# Patient Record
Sex: FEMALE | Race: WHITE | NOT HISPANIC OR LATINO | Employment: PART TIME | URBAN - METROPOLITAN AREA
[De-identification: names, ages, dates, MRNs, and addresses within clinical notes are randomized per-mention and may not be internally consistent; named-entity substitution may affect disease eponyms.]

---

## 2017-02-07 DIAGNOSIS — I10 HYPERTENSION GOAL BP (BLOOD PRESSURE) < 140/90: Primary | ICD-10-CM

## 2017-02-07 NOTE — TELEPHONE ENCOUNTER
atenolol-chlorthalidone (TENORETIC 100) 100-25 MG per tablet   Last Written Prescription Date: 1-  Last Fill Quantity: 90, # refills: 3    Last Office Visit with G, P or [facility] prescribing provider:  6-   Future Office Visit:    Next 5 appointments (look out 90 days)     Feb 22, 2017  8:00 AM   Office Visit with BARRY Richter CNP   [facility] ([facility])    [address] [place] [id number] [phone]                    BP Readings from Last 3 Encounters:   09/15/16 107/75   06/22/16 119/81   01/29/16 129/87

## 2017-02-08 RX ORDER — ATENOLOL AND CHLORTHALIDONE TABLET 100; 25 MG/1; MG/1
1 TABLET ORAL DAILY
Qty: 30 TABLET | Refills: 0 | Status: SHIPPED | OUTPATIENT
Start: 2017-02-08 | End: 2017-02-22

## 2017-02-08 NOTE — TELEPHONE ENCOUNTER
Ok x one month- patient has appointment scheduled.  Carina Lozano,RN  [facility]  [phone]

## 2017-02-22 ENCOUNTER — OFFICE VISIT (OUTPATIENT)
Dept: FAMILY MEDICINE | Facility: CLINIC | Age: 62
End: 2017-02-22
Payer: COMMERCIAL

## 2017-02-22 VITALS
RESPIRATION RATE: 16 BRPM | TEMPERATURE: 97.6 F | OXYGEN SATURATION: 98 % | HEIGHT: 66 IN | SYSTOLIC BLOOD PRESSURE: 115 MMHG | BODY MASS INDEX: 24.11 KG/M2 | DIASTOLIC BLOOD PRESSURE: 82 MMHG | HEART RATE: 58 BPM | WEIGHT: 150 LBS

## 2017-02-22 DIAGNOSIS — E78.5 HYPERLIPIDEMIA LDL GOAL <130: ICD-10-CM

## 2017-02-22 DIAGNOSIS — K59.04 CHRONIC IDIOPATHIC CONSTIPATION: ICD-10-CM

## 2017-02-22 DIAGNOSIS — I10 HYPERTENSION GOAL BP (BLOOD PRESSURE) < 140/90: ICD-10-CM

## 2017-02-22 DIAGNOSIS — M81.0 OSTEOPOROSIS: ICD-10-CM

## 2017-02-22 DIAGNOSIS — Z00.00 ENCOUNTER FOR ROUTINE ADULT HEALTH EXAMINATION WITHOUT ABNORMAL FINDINGS: Primary | ICD-10-CM

## 2017-02-22 DIAGNOSIS — G40.909 SEIZURE DISORDER (H): ICD-10-CM

## 2017-02-22 DIAGNOSIS — G43.019 INTRACTABLE MIGRAINE WITHOUT AURA AND WITHOUT STATUS MIGRAINOSUS: ICD-10-CM

## 2017-02-22 DIAGNOSIS — Z11.59 NEED FOR HEPATITIS C SCREENING TEST: ICD-10-CM

## 2017-02-22 LAB
ALBUMIN SERPL-MCNC: 4 G/DL (ref 3.4–5)
ALP SERPL-CCNC: 95 U/L (ref 40–150)
ALT SERPL W P-5'-P-CCNC: 33 U/L (ref 0–50)
ANION GAP SERPL CALCULATED.3IONS-SCNC: 7 MMOL/L (ref 3–14)
AST SERPL W P-5'-P-CCNC: 21 U/L (ref 0–45)
BILIRUB SERPL-MCNC: 0.4 MG/DL (ref 0.2–1.3)
BUN SERPL-MCNC: 14 MG/DL (ref 7–30)
CALCIUM SERPL-MCNC: 10.2 MG/DL (ref 8.5–10.1)
CARBAMAZEPINE SERPL-MCNC: 8.6 MG/L (ref 4–12)
CHLORIDE SERPL-SCNC: 98 MMOL/L (ref 94–109)
CHOLEST SERPL-MCNC: 226 MG/DL
CO2 SERPL-SCNC: 29 MMOL/L (ref 20–32)
CREAT SERPL-MCNC: 0.7 MG/DL (ref 0.52–1.04)
GFR SERPL CREATININE-BSD FRML MDRD: 85 ML/MIN/1.7M2
GLUCOSE SERPL-MCNC: 91 MG/DL (ref 70–99)
HCV AB SERPL QL IA: NORMAL
HDLC SERPL-MCNC: 106 MG/DL
LDLC SERPL CALC-MCNC: 101 MG/DL
NONHDLC SERPL-MCNC: 120 MG/DL
POTASSIUM SERPL-SCNC: 3.2 MMOL/L (ref 3.4–5.3)
PROT SERPL-MCNC: 7.7 G/DL (ref 6.8–8.8)
SODIUM SERPL-SCNC: 134 MMOL/L (ref 133–144)
TRIGL SERPL-MCNC: 96 MG/DL

## 2017-02-22 PROCEDURE — 80156 ASSAY CARBAMAZEPINE TOTAL: CPT | Performed by: NURSE PRACTITIONER

## 2017-02-22 PROCEDURE — 86803 HEPATITIS C AB TEST: CPT | Performed by: NURSE PRACTITIONER

## 2017-02-22 PROCEDURE — 80061 LIPID PANEL: CPT | Performed by: NURSE PRACTITIONER

## 2017-02-22 PROCEDURE — 36415 COLL VENOUS BLD VENIPUNCTURE: CPT | Performed by: NURSE PRACTITIONER

## 2017-02-22 PROCEDURE — 99396 PREV VISIT EST AGE 40-64: CPT | Performed by: NURSE PRACTITIONER

## 2017-02-22 PROCEDURE — 80053 COMPREHEN METABOLIC PANEL: CPT | Performed by: NURSE PRACTITIONER

## 2017-02-22 RX ORDER — CARBAMAZEPINE 200 MG/1
200 TABLET ORAL 2 TIMES DAILY
Qty: 180 TABLET | Refills: 3 | Status: ON HOLD | OUTPATIENT
Start: 2017-02-22 | End: 2018-02-25

## 2017-02-22 RX ORDER — SIMVASTATIN 40 MG
40 TABLET ORAL AT BEDTIME
Qty: 90 TABLET | Refills: 3 | Status: SHIPPED | OUTPATIENT
Start: 2017-02-22 | End: 2018-06-06

## 2017-02-22 RX ORDER — ATENOLOL AND CHLORTHALIDONE TABLET 100; 25 MG/1; MG/1
1 TABLET ORAL DAILY
Qty: 90 TABLET | Refills: 3 | Status: ON HOLD | OUTPATIENT
Start: 2017-02-22 | End: 2018-02-25

## 2017-02-22 RX ORDER — IBANDRONATE SODIUM 150 MG/1
150 TABLET, FILM COATED ORAL
Qty: 3 TABLET | Refills: 3 | Status: SHIPPED | OUTPATIENT
Start: 2017-02-22 | End: 2018-02-22

## 2017-02-22 RX ORDER — TOPIRAMATE 25 MG/1
25 TABLET, FILM COATED ORAL 2 TIMES DAILY
Qty: 180 TABLET | Refills: 3 | Status: SHIPPED | OUTPATIENT
Start: 2017-02-22 | End: 2019-04-29

## 2017-02-22 NOTE — PROGRESS NOTES
SUBJECTIVE:     CC: Love Pinon is an 61 year old woman who presents for preventive health visit.     Healthy Habits:    Do you get at least three servings of calcium containing foods daily (dairy, green leafy vegetables, etc.)? yes    Amount of exercise or daily activities, outside of work: 0 day(s) per week    Problems taking medications regularly No    Medication side effects: No    Have you had an eye exam in the past two years? yes    Do you see a dentist twice per year? no    Do you have sleep apnea, excessive snoring or daytime drowsiness?no             Today's PHQ-2 Score:   PHQ-2 ( 1999 Pfizer) 6/22/2016 1/29/2016   Q1: Little interest or pleasure in doing things 0 0   Q2: Feeling down, depressed or hopeless 0 0   PHQ-2 Score 0 0   Little interest or pleasure in doing things - -   Feeling down, depressed or hopeless - -   PHQ-2 Score - -       Abuse: Current or Past(Physical, Sexual or Emotional)- No  Do you feel safe in your environment - Yes    Social History   Substance Use Topics     Smoking status: Former Smoker     Years: 18.00     Types: Cigarettes     Quit date: 12/31/1989     Smokeless tobacco: Never Used     Alcohol use 1.8 oz/week     3 Standard drinks or equivalent per week      Comment: 2/week     The patient does not drink >3 drinks per day nor >7 drinks per week.    Recent Labs   Lab Test  02/09/16   0902  07/23/14   1144  09/17/12   0829   CHOL  222*  216*  233*   HDL  100  99  71   LDL  103*  101  137*   TRIG  93  83  124   CHOLHDLRATIO   --   2.2  3.3   NHDL  122   --    --        Reviewed orders with patient.  Reviewed health maintenance and updated orders accordingly - Yes    Mammo Decision Support:  Patient over age 50, mutual decision to screen reflected in health maintenance.  Pertinent mammograms are reviewed under the imaging tab.    History of abnormal Pap smear: NO - age 30- 65 PAP every 3 years recommended  All Histories reviewed and updated in [facility].  Past Medical  History   Diagnosis Date     BPPV (benign paroxysmal positional vertigo) 10/26/2012     Herpes genitalia      Hyperlipidemia      Hypertension      Iron deficiency anemia secondary to blood loss (chronic)      menorrhagia     Migraine headache without aura      Seizure disorder (H)      one seizure-grandmal in 1989      Past Surgical History   Procedure Laterality Date     D & c       Hysteroscopy       Tubal ligation       Cl aff surgical pathology  1970     with cryotherapy     Appendectomy  2007     Colonoscopy  2005, 9/15/16     hx polyp (hyerplastic?),      Colonoscopy N/A 9/15/2016     Procedure: COMBINED COLONOSCOPY, SINGLE OR MULTIPLE BIOPSY/POLYPECTOMY BY BIOPSY;  Surgeon: Martin Dewitt MD;  Location: [facility]       Social Hx: Single. Has 2 children (son & daughter). Works selling granite.       ROS:  C: NEGATIVE for fever, chills, change in weight  I: NEGATIVE for worrisome rashes, moles or lesions  E: NEGATIVE for vision changes or irritation  ENT: NEGATIVE for ear, mouth and throat problems. Zyrtec for allergies prn.   R: NEGATIVE for significant cough or SOB  B: NEGATIVE for masses, tenderness or discharge  CV: NEGATIVE for chest pain, palpitations or peripheral edema  GI: NEGATIVE for nausea, abdominal pain, heartburn, or change in bowel habits. Chronic constipation. Takes Laverne-Colace at bedtime.  Colonoscopy 9/2016.  Had 3 adenoma polyps removed. Next procedure in 3 years   : NEGATIVE for unusual urinary or vaginal symptoms. No vaginal bleeding. No genital herpes outbreaks in past year.   M: NEGATIVE for significant arthralgias or myalgia. DEXA bone density test  8/2016 showed osteoporosis in spine (t score - 2.6). Wants to take preventive medication. Her mother had osteoporosis.    N: NEGATIVE for weakness, dizziness or paresthesias. Topamax for migraine prophylaxis and Tegretol for one grand mal seizure many years ago.   P: NEGATIVE for changes in mood or affect       OBJECTIVE:     /82 (Cuff  "Size: Adult Large)  Pulse 58  Temp 97.6  F (36.4  C) (Tympanic)  Resp 16  Ht 5' 5.75\" (1.67 m)  Wt 150 lb (68 kg)  SpO2 98%  BMI 24.4 kg/m2  EXAM:  GENERAL APPEARANCE: healthy, alert and no distress  EYES: Eyes grossly normal to inspection, PERRL and conjunctivae and sclerae normal  HENT: ear canals and TM's normal, nose and mouth without ulcers or lesions, oropharynx clear and oral mucous membranes moist  NECK: no adenopathy, no asymmetry, masses, or scars and thyroid normal to palpation  RESP: lungs clear to auscultation - no rales, rhonchi or wheezes  BREAST: normal without masses, tenderness or nipple discharge and no palpable axillary masses or adenopathy  CV: regular rate and rhythm, normal S1 S2, no S3 or S4, no murmur, click or rub, no peripheral edema and peripheral pulses strong  ABDOMEN: soft, nontender, no hepatosplenomegaly, no masses and bowel sounds normal  MS: no musculoskeletal defects are noted and gait is age appropriate without ataxia  SKIN: no suspicious lesions or rashes  NEURO: Normal strength and tone, sensory exam grossly normal, mentation intact and speech normal  PSYCH: mentation appears normal and affect normal/bright    ASSESSMENT/PLAN:     Love was seen today for physical.    Diagnoses and all orders for this visit:    Encounter for routine adult health examination without abnormal findings  -     Comprehensive metabolic panel    Hypertension goal BP (blood pressure) < 140/90  -     Comprehensive metabolic panel  -     Lipid Profile with reflex to direct LDL  -     atenolol-chlorthalidone (TENORETIC 100) 100-25 MG per tablet; Take 1 tablet by mouth daily    Hyperlipidemia LDL goal <130  -     Comprehensive metabolic panel  -     simvastatin (ZOCOR) 40 MG tablet; Take 1 tablet (40 mg) by mouth At Bedtime    Intractable migraine without aura and without status migrainosus  -     Hepatitis C Screen Reflex to HCV RNA Quant and Genotype  -     topiramate (TOPAMAX) 25 MG tablet; Take 1 " "tablet (25 mg) by mouth 2 times daily    Seizure disorder (H)  -     Carbamazepine total  -     carBAMazepine (TEGRETOL) 200 MG tablet; Take 1 tablet (200 mg) by mouth 2 times daily    Osteoporosis  -     IBANdronate (BONIVA) 150 MG tablet; Take 1 tablet (150 mg) by mouth every 30 days Take 60 minutes before am meal with 8 oz. water. Remain upright for 30 minutes.    Chronic idiopathic constipation    Need for hepatitis C screening test      I have discussed with patient the risks, benefits, medications, treatment options and modalities.  - Boniva  - Plan DEXA in one year.     COUNSELING:   Reviewed preventive health counseling, as reflected in patient instructions  Special attention given to:        Regular exercise       Healthy diet/nutrition       Vision screening       Hearing screening       Osteoporosis Prevention/Bone Health       Consider Hep C screening for patients born between 1945 and 1965         reports that she quit smoking about 27 years ago. Her smoking use included Cigarettes. She quit after 18.00 years of use. She has never used smokeless tobacco.    Estimated body mass index is 22.44 kg/(m^2) as calculated from the following:    Height as of 9/15/16: 5' 6\" (1.676 m).    Weight as of 9/15/16: 139 lb (63 kg).       Counseling Resources:  ATP IV Guidelines  Pooled Cohorts Equation Calculator  Breast Cancer Risk Calculator  FRAX Risk Assessment  ICSI Preventive Guidelines  Dietary Guidelines for Americans, 2010  USDA's MyPlate  ASA Prophylaxis  Lung CA Screening    BARRY Richter [facility]  "

## 2017-02-22 NOTE — MR AVS SNAPSHOT
After Visit Summary   2/22/2017    Love Pinon    MRN: [id number]           Patient Information     Date Of Birth          1955        Visit Information        Provider Department      2/22/2017 8:00 AM Rosey Limon APRN [facility]        Today's Diagnoses     Encounter for routine adult health examination without abnormal findings    -  1    Need for hepatitis C screening test        Hyperlipidemia LDL goal <130        Hypertension goal BP (blood pressure) < 140/90        Seizure disorder (H)        Intractable migraine without aura and without status migrainosus        Osteoporosis          Care Instructions      Preventive Health Recommendations  Female Ages 50 - 64    Yearly exam: See your health care provider every year in order to  o Review health changes.   o Discuss preventive care.    o Review your medicines if your doctor has prescribed any.      Get a Pap test every three years (unless you have an abnormal result and your provider advises testing more often).    If you get Pap tests with HPV test, you only need to test every 5 years, unless you have an abnormal result.     You do not need a Pap test if your uterus was removed (hysterectomy) and you have not had cancer.    You should be tested each year for STDs (sexually transmitted diseases) if you're at risk.     Have a mammogram every 1 to 2 years.    Have a colonoscopy at age 50, or have a yearly FIT test (stool test). These exams screen for colon cancer.      Have a cholesterol test every 5 years, or more often if advised.    Have a diabetes test (fasting glucose) every three years. If you are at risk for diabetes, you should have this test more often.     If you are at risk for osteoporosis (brittle bone disease), think about having a bone density scan (DEXA).    Shots: Get a flu shot each year. Get a tetanus shot every 10 years.    Nutrition:     Eat at least 5 servings of fruits and vegetables each  day.    Eat whole-grain bread, whole-wheat pasta and brown rice instead of white grains and rice.    Talk to your provider about Calcium and Vitamin D.     Lifestyle    Exercise at least 150 minutes a week (30 minutes a day, 5 days a week). This will help you control your weight and prevent disease.    Limit alcohol to one drink per day.    No smoking.     Wear sunscreen to prevent skin cancer.     See your dentist every six months for an exam and cleaning.    See your eye doctor every 1 to 2 years.          Follow-ups after your visit        Who to contact     If you have questions or need follow up information about today's clinic visit or your schedule please contact [facility] [place] directly at [phone].  Normal or non-critical lab and imaging results will be communicated to you by [employer], letter or phone within 4 business days after the clinic has received the results. If you do not hear from us within 7 days, please contact the clinic through [employer] or phone. If you have a critical or abnormal lab result, we will notify you by phone as soon as possible.  Submit refill requests through [employer] or call your pharmacy and they will forward the refill request to us. Please allow 3 business days for your refill to be completed.          Additional Information About Your Visit        Swapsee Information     [employer] gives you secure access to your electronic health record. If you see a primary care provider, you can also send messages to your care team and make appointments. If you have questions, please call your primary care clinic.  If you do not have a primary care provider, please call [phone] and they will assist you.        Care EveryWhere ID     This is your Care EveryWhere ID. This could be used by other organizations to access your [facility] medical records  [id number]        Your Vitals Were     Pulse Temperature Respirations Height Pulse Oximetry BMI (Body Mass Index)    58 97.6  F  "(36.4  C) (Tympanic) 16 5' 5.75\" (1.67 m) 98% 24.4 kg/m2       Blood Pressure from Last 3 Encounters:   02/22/17 115/82   09/15/16 107/75   06/22/16 119/81    Weight from Last 3 Encounters:   02/22/17 150 lb (68 kg)   09/15/16 139 lb (63 kg)   06/22/16 143 lb (64.9 kg)              We Performed the Following     Carbamazepine total     Comprehensive metabolic panel     Hepatitis C Screen Reflex to HCV RNA Quant and Genotype     Lipid Profile with reflex to direct LDL          Today's Medication Changes          These changes are accurate as of: 2/22/17  8:47 AM.  If you have any questions, ask your nurse or doctor.               Start taking these medicines.        Dose/Directions    IBANdronate 150 MG tablet   Commonly known as:  BONIVA   Used for:  Osteoporosis   Started by:  Rosey Limon APRN CNP        Dose:  150 mg   Take 1 tablet (150 mg) by mouth every 30 days Take 60 minutes before am meal with 8 oz. water. Remain upright for 30 minutes.   Quantity:  3 tablet   Refills:  3            Where to get your medicines      These medications were sent to [facility]  [place], [place] - [address] [facility]  [address] [facility] [address]    Hours:  Added 10/26 CK Checked with pharmacy Phone:  [phone]     atenolol-chlorthalidone 100-25 MG per tablet    carBAMazepine 200 MG tablet    IBANdronate 150 MG tablet    simvastatin 40 MG tablet    topiramate 25 MG tablet                Primary Care Provider Office Phone # Fax #    BARRY Richter [phone]       [address]  [place] [address]        Thank you!     [facility]. Our goal is always to provide you with excellent care. Hearing back from our patients is one way we can continue to improve our services. Please take a few minutes to complete the written survey that you may receive in the mail after your visit with us. Thank " you!             Your Updated Medication List - Protect others around you: Learn how to safely use, store and throw away your medicines at www.disposemymeds.org.          This list is accurate as of: 2/22/17  8:47 AM.  Always use your most recent med list.                   Brand Name Dispense Instructions for use    atenolol-chlorthalidone 100-25 MG per tablet    TENORETIC 100    90 tablet    Take 1 tablet by mouth daily       calcium + D 600-200 MG-UNIT Tabs   Generic drug:  calcium carbonate-vitamin D      2 tablets daily       carBAMazepine 200 MG tablet    TEGRETOL    180 tablet    Take 1 tablet (200 mg) by mouth 2 times daily       Cranberry 500 MG Caps      Take  by mouth. Daily       fish oil-omega-3 fatty acids 1000 MG capsule      Twice daily       IBANdronate 150 MG tablet    BONIVA    3 tablet    Take 1 tablet (150 mg) by mouth every 30 days Take 60 minutes before am meal with 8 oz. water. Remain upright for 30 minutes.       KLS ALLER-JUWAN 10 MG tablet   Generic drug:  cetirizine      Take 10 mg by mouth daily.       senna-docusate 8.6-50 MG per tablet    SENOKOT-S;PERICOLACE    28 tablet    Take 1 tablet by mouth daily       simvastatin 40 MG tablet    ZOCOR    90 tablet    Take 1 tablet (40 mg) by mouth At Bedtime       topiramate 25 MG tablet    TOPAMAX    180 tablet    Take 1 tablet (25 mg) by mouth 2 times daily

## 2017-02-22 NOTE — NURSING NOTE
"Chief Complaint   Patient presents with     Physical       Initial /82 (Cuff Size: Adult Large)  Pulse 58  Temp 97.6  F (36.4  C) (Tympanic)  Resp 16  Ht 5' 5.75\" (1.67 m)  Wt 150 lb (68 kg)  SpO2 98%  BMI 24.4 kg/m2 Estimated body mass index is 24.4 kg/(m^2) as calculated from the following:    Height as of this encounter: 5' 5.75\" (1.67 m).    Weight as of this encounter: 150 lb (68 kg).  Medication Reconciliation: complete   Kaila Tabares, CMA    "

## 2017-02-26 DIAGNOSIS — E83.52 SERUM CALCIUM ELEVATED: Primary | ICD-10-CM

## 2017-02-26 DIAGNOSIS — E87.6 LOW BLOOD POTASSIUM: ICD-10-CM

## 2017-08-14 ENCOUNTER — OFFICE VISIT (OUTPATIENT)
Dept: FAMILY MEDICINE | Facility: CLINIC | Age: 62
End: 2017-08-14
Payer: COMMERCIAL

## 2017-08-14 VITALS
DIASTOLIC BLOOD PRESSURE: 72 MMHG | HEART RATE: 60 BPM | SYSTOLIC BLOOD PRESSURE: 116 MMHG | BODY MASS INDEX: 24.75 KG/M2 | OXYGEN SATURATION: 100 % | WEIGHT: 154 LBS | HEIGHT: 66 IN | RESPIRATION RATE: 16 BRPM | TEMPERATURE: 97.7 F

## 2017-08-14 DIAGNOSIS — E87.6 HYPOKALEMIA: ICD-10-CM

## 2017-08-14 DIAGNOSIS — M81.0 AGE RELATED OSTEOPOROSIS, UNSPECIFIED PATHOLOGICAL FRACTURE PRESENCE: ICD-10-CM

## 2017-08-14 DIAGNOSIS — R30.9 PAIN WITH URINATION: Primary | ICD-10-CM

## 2017-08-14 DIAGNOSIS — G40.909 SEIZURE DISORDER (H): ICD-10-CM

## 2017-08-14 DIAGNOSIS — E83.52 HYPERCALCEMIA: ICD-10-CM

## 2017-08-14 DIAGNOSIS — L29.9 PRURITIC DISORDER: ICD-10-CM

## 2017-08-14 DIAGNOSIS — R31.9 HEMATURIA: ICD-10-CM

## 2017-08-14 DIAGNOSIS — E87.1 HYPONATREMIA: ICD-10-CM

## 2017-08-14 LAB
ALBUMIN SERPL-MCNC: 4 G/DL (ref 3.4–5)
ALBUMIN UR-MCNC: NEGATIVE MG/DL
ALP SERPL-CCNC: 88 U/L (ref 40–150)
ALT SERPL W P-5'-P-CCNC: 36 U/L (ref 0–50)
ANION GAP SERPL CALCULATED.3IONS-SCNC: 8 MMOL/L (ref 3–14)
APPEARANCE UR: CLEAR
AST SERPL W P-5'-P-CCNC: 24 U/L (ref 0–45)
BACTERIA #/AREA URNS HPF: ABNORMAL /HPF
BILIRUB SERPL-MCNC: 0.4 MG/DL (ref 0.2–1.3)
BILIRUB UR QL STRIP: NEGATIVE
BUN SERPL-MCNC: 13 MG/DL (ref 7–30)
CA-I SERPL ISE-MCNC: 5.1 MG/DL (ref 4.4–5.2)
CALCIUM SERPL-MCNC: 10.2 MG/DL (ref 8.5–10.1)
CHLORIDE SERPL-SCNC: 93 MMOL/L (ref 94–109)
CO2 SERPL-SCNC: 29 MMOL/L (ref 20–32)
COLOR UR AUTO: YELLOW
CREAT SERPL-MCNC: 0.81 MG/DL (ref 0.52–1.04)
DEPRECATED CALCIDIOL+CALCIFEROL SERPL-MC: 55 UG/L (ref 20–75)
GFR SERPL CREATININE-BSD FRML MDRD: 71 ML/MIN/1.7M2
GLUCOSE SERPL-MCNC: 107 MG/DL (ref 70–99)
GLUCOSE UR STRIP-MCNC: NEGATIVE MG/DL
HGB UR QL STRIP: ABNORMAL
KETONES UR STRIP-MCNC: NEGATIVE MG/DL
LEUKOCYTE ESTERASE UR QL STRIP: NEGATIVE
NITRATE UR QL: NEGATIVE
PH UR STRIP: 7 PH (ref 5–7)
POTASSIUM SERPL-SCNC: 3.6 MMOL/L (ref 3.4–5.3)
PROT SERPL-MCNC: 7.7 G/DL (ref 6.8–8.8)
PTH-INTACT SERPL-MCNC: 17 PG/ML (ref 12–72)
RBC #/AREA URNS AUTO: ABNORMAL /HPF (ref 0–2)
SODIUM SERPL-SCNC: 130 MMOL/L (ref 133–144)
SP GR UR STRIP: 1.01 (ref 1–1.03)
URN SPEC COLLECT METH UR: ABNORMAL
UROBILINOGEN UR STRIP-ACNC: 0.2 EU/DL (ref 0.2–1)
WBC #/AREA URNS AUTO: ABNORMAL /HPF (ref 0–2)

## 2017-08-14 PROCEDURE — 99214 OFFICE O/P EST MOD 30 MIN: CPT | Performed by: PHYSICIAN ASSISTANT

## 2017-08-14 PROCEDURE — 36415 COLL VENOUS BLD VENIPUNCTURE: CPT | Performed by: PHYSICIAN ASSISTANT

## 2017-08-14 PROCEDURE — 81001 URINALYSIS AUTO W/SCOPE: CPT | Performed by: PHYSICIAN ASSISTANT

## 2017-08-14 PROCEDURE — 80053 COMPREHEN METABOLIC PANEL: CPT | Performed by: PHYSICIAN ASSISTANT

## 2017-08-14 PROCEDURE — 82306 VITAMIN D 25 HYDROXY: CPT | Performed by: PHYSICIAN ASSISTANT

## 2017-08-14 PROCEDURE — 83970 ASSAY OF PARATHORMONE: CPT | Performed by: PHYSICIAN ASSISTANT

## 2017-08-14 PROCEDURE — 82330 ASSAY OF CALCIUM: CPT | Performed by: PHYSICIAN ASSISTANT

## 2017-08-14 NOTE — PATIENT INSTRUCTIONS
1. Try off of the Boniva for one month (skip August dose) and then update me on your symptoms.   2. Keep me posted on the urinary symptoms over the next week. Consider cutting back on iced tea.

## 2017-08-14 NOTE — MR AVS SNAPSHOT
After Visit Summary   8/14/2017    Love Pinon    MRN: [id number]           Patient Information     Date Of Birth          1955        Visit Information        Provider Department      8/14/2017 8:40 AM Kenna Davis PA-C [facility]        Today's Diagnoses     Pain with urination    -  1    Hypercalcemia        Hypokalemia        Age related osteoporosis, unspecified pathological fracture presence          Care Instructions    1. Try off of the Boniva for one month (skip August dose) and then update me on your symptoms.   2. Keep me posted on the urinary symptoms over the next week. Consider cutting back on iced tea.             Follow-ups after your visit        Who to contact     If you have questions or need follow up information about today's clinic visit or your schedule please contact [facility] [place] directly at [phone].  Normal or non-critical lab and imaging results will be communicated to you by [employer], letter or phone within 4 business days after the clinic has received the results. If you do not hear from us within 7 days, please contact the clinic through [employer] or phone. If you have a critical or abnormal lab result, we will notify you by phone as soon as possible.  Submit refill requests through S[employer]where or call your pharmacy and they will forward the refill request to us. Please allow 3 business days for your refill to be completed.          Additional Information About Your Visit        MyChart Information     [employer] gives you secure access to your electronic health record. If you see a primary care provider, you can also send messages to your care team and make appointments. If you have questions, please call your primary care clinic.  If you do not have a primary care provider, please call [phone] and they will assist you.        Care EveryWhere ID     This is your Care EveryWhere ID. This could be used by other  "organizations to access your [facility] medical records  [id number]        Your Vitals Were     Pulse Temperature Respirations Height Pulse Oximetry BMI (Body Mass Index)    60 97.7  F (36.5  C) 16 5' 5.75\" (1.67 m) 100% 25.05 kg/m2       Blood Pressure from Last 3 Encounters:   08/14/17 116/72   02/22/17 115/82   09/15/16 107/75    Weight from Last 3 Encounters:   08/14/17 154 lb (69.9 kg)   02/22/17 150 lb (68 kg)   09/15/16 139 lb (63 kg)              We Performed the Following     *UA reflex to Microscopic     Calcium ionized     Comprehensive metabolic panel (BMP + Alb, Alk Phos, ALT, AST, Total. Bili, TP)     Parathyroid Hormone Intact     Urine Microscopic     Vitamin D Deficiency          Today's Medication Changes          These changes are accurate as of: 8/14/17  9:21 AM.  If you have any questions, ask your nurse or doctor.               These medicines have changed or have updated prescriptions.        Dose/Directions    calcium + D 600-200 MG-UNIT Tabs   This may have changed:  See the new instructions.   Generic drug:  calcium carbonate-vitamin D   Changed by:  Kenna Davis PA-C        Dose:  1 tablet   Take 1 tablet by mouth daily   Quantity:  60 tablet   Refills:  0                Primary Care Provider Office Phone # Fax #    Kenna Davis PA-C [phone]        [facility] [address] [facility] [place] [place] [address]        Equal Access to Services     [facility] AH: Hadii nagi ku hadasho Soomaali, waaxda luqadaha, qaybta kaalmada adeegyada, wax patricia faulkner . So [facility] [phone].    ATENCIÓN: Si habla español, tiene a amanda disposición servicios gratuitos de asistencia lingüística. Llame al [phone].    We comply with applicable federal civil rights laws and [place] laws. We do not discriminate on the basis of race, color, national origin, age, disability sex, sexual orientation or gender identity.            Thank you!     Thank you for " choosing [facility] [place]  for your care. Our goal is always to provide you with excellent care. Hearing back from our patients is one way we can continue to improve our services. Please take a few minutes to complete the written survey that you may receive in the mail after your visit with us. Thank you!             Your Updated Medication List - Protect others around you: Learn how to safely use, store and throw away your medicines at www.disposemymeds.org.          This list is accurate as of: 8/14/17  9:21 AM.  Always use your most recent med list.                   Brand Name Dispense Instructions for use Diagnosis    atenolol-chlorthalidone 100-25 MG per tablet    TENORETIC 100    90 tablet    Take 1 tablet by mouth daily    Hypertension goal BP (blood pressure) < 140/90       calcium + D 600-200 MG-UNIT Tabs   Generic drug:  calcium carbonate-vitamin D     60 tablet    Take 1 tablet by mouth daily        carBAMazepine 200 MG tablet    TEGRETOL    180 tablet    Take 1 tablet (200 mg) by mouth 2 times daily    Seizure disorder (H)       Cranberry 500 MG Caps      Take  by mouth. Daily        fish oil-omega-3 fatty acids 1000 MG capsule      Twice daily        IBANdronate 150 MG tablet    BONIVA    3 tablet    Take 1 tablet (150 mg) by mouth every 30 days Take 60 minutes before am meal with 8 oz. water. Remain upright for 30 minutes.    Osteoporosis       KLS ALLER-JUWAN 10 MG tablet   Generic drug:  cetirizine      Take 10 mg by mouth daily.        senna-docusate 8.6-50 MG per tablet    SENOKOT-S;PERICOLACE    28 tablet    Take 1 tablet by mouth daily        simvastatin 40 MG tablet    ZOCOR    90 tablet    Take 1 tablet (40 mg) by mouth At Bedtime    Hyperlipidemia LDL goal <130       topiramate 25 MG tablet    TOPAMAX    180 tablet    Take 1 tablet (25 mg) by mouth 2 times daily    Intractable migraine without aura and without status migrainosus

## 2017-08-14 NOTE — PROGRESS NOTES
"Chief Complaint   Patient presents with     UTI     Medication Problem     ??? Boniva       Initial /72  Pulse 60  Temp 97.7  F (36.5  C)  Resp 16  Ht 5' 5.75\" (1.67 m)  Wt 154 lb (69.9 kg)  SpO2 100%  BMI 25.05 kg/m2 Estimated body mass index is 25.05 kg/(m^2) as calculated from the following:    Height as of this encounter: 5' 5.75\" (1.67 m).    Weight as of this encounter: 154 lb (69.9 kg).  Medication Reconciliation: complete. HIRAM Wills LPN      SUBJECTIVE:                                                    Love Pinon is a 62 year old female who presents to clinic today for the following health issues:      URINARY TRACT SYMPTOMS  Onset: 1 week    Description:   Painful urination (Dysuria): YES  Blood in urine (Hematuria): no   Delay in urine (Hesitency): no     Intensity: moderate    Progression of Symptoms:  worsening    Accompanying Signs & Symptoms:  Fever/chills: no   Flank pain no   Nausea and vomiting: no   Any vaginal symptoms: none  Abdominal/Pelvic Pain: no     History:   History of frequent UTI's: YES- 1-2 / year  History of kidney stones: no   Sexually Active: no   Possibility of pregnancy: No    Precipitating factors:   none    Therapies Tried and outcome: good with fluid intake     Symptoms are not as bad as usually are - usually more pain. Unsure if UTI.     #2 - concerns with possible reaction to Boniva medication - on rx x 4 months has itchy skin- skin changes. Says for past month, her skin from upper chest to neck under jawline are itchy and leather like. She thinks it's from the Boniva. Tried topical benadryl and and eczema non-itch cream.       -------------------------------------    Problem list and histories reviewed & adjusted, as indicated.  Additional history: as documented    Patient Active Problem List   Diagnosis     HYPERLIPIDEMIA LDL GOAL <130     Advance care planning     BPPV (benign paroxysmal positional vertigo)     Migraine headache without aura     " Hypertension goal BP (blood pressure) < 140/90     Seizure disorder (H)     Osteoporosis     Chronic idiopathic constipation     Past Surgical History:   Procedure Laterality Date     APPENDECTOMY  2007     CL AFF SURGICAL PATHOLOGY  1970    with cryotherapy     COLONOSCOPY  2005    polyp excision, repeat  5 years      COLONOSCOPY N/A 9/15/2016    3 adenoma polyps, repeat in 3 years      D & C       HYSTEROSCOPY       TUBAL LIGATION         Social History   Substance Use Topics     Smoking status: Former Smoker     Years: 18.00     Types: Cigarettes     Quit date: 1989     Smokeless tobacco: Never Used     Alcohol use 1.8 oz/week     3 Standard drinks or equivalent per week      Comment: 2/week     Family History   Problem Relation Age of Onset     Cardiovascular Paternal Grandfather       of heart attack     Hypertension Father      Lipids Father      CANCER Father      MELANOMA     Genitourinary Problems Mother      CANCER Paternal Grandmother      Asthma Sister      Asthma Sister          Current Outpatient Prescriptions   Medication Sig Dispense Refill     calcium carbonate-vitamin D (CALCIUM + D) 600-200 MG-UNIT TABS Take 1 tablet by mouth daily 60 tablet 0     atenolol-chlorthalidone (TENORETIC 100) 100-25 MG per tablet Take 1 tablet by mouth daily 90 tablet 3     carBAMazepine (TEGRETOL) 200 MG tablet Take 1 tablet (200 mg) by mouth 2 times daily 180 tablet 3     topiramate (TOPAMAX) 25 MG tablet Take 1 tablet (25 mg) by mouth 2 times daily 180 tablet 3     simvastatin (ZOCOR) 40 MG tablet Take 1 tablet (40 mg) by mouth At Bedtime 90 tablet 3     IBANdronate (BONIVA) 150 MG tablet Take 1 tablet (150 mg) by mouth every 30 days Take 60 minutes before am meal with 8 oz. water. Remain upright for 30 minutes. 3 tablet 3     senna-docusate (SENOKOT-S;PERICOLACE) 8.6-50 MG per tablet Take 1 tablet by mouth daily 28 tablet 0     cetirizine (KLS ALLER-JUWAN) 10 MG tablet Take 10 mg by mouth daily.        "Cranberry 500 MG CAPS Take  by mouth. Daily         FISH OIL 1000 MG OR CAPS Twice daily       Allergies   Allergen Reactions     Aramine [Metaraminol Bitartrate]      Penicillins      Hives, swelling     Labs reviewed in EPIC      Reviewed and updated as needed this visit by clinical staffTobacco  Allergies  Meds  Surg Hx  Fam Hx  Soc Hx      Reviewed and updated as needed this visit by Provider         Social History     Social History     Marital status: Single     Spouse name:      Number of children: 2     Years of education: N/A     Occupational History     sales  [employer]     Social History Main Topics     Smoking status: Former Smoker     Years: 18.00     Types: Cigarettes     Quit date: 12/31/1989     Smokeless tobacco: Never Used     Alcohol use 1.8 oz/week     3 Standard drinks or equivalent per week      Comment: 2/week     Drug use: No     Sexual activity: No     Other Topics Concern      Service No     Blood Transfusions No     Caffeine Concern No     Occupational Exposure No     Hobby Hazards No     Sleep Concern Yes     trouble sleeping      Stress Concern Yes     little     Weight Concern No     Special Diet No     Back Care Yes     low back pain would like refferal to chiroprator     Exercise Yes     alot work with job     Bike Helmet No     Seat Belt Yes     Self-Exams Yes     Parent/Sibling W/ Cabg, Mi Or Angioplasty Before 65f 55m? No     Social History Narrative     is alcoholic and had stage 4 lung cancer    Two children    Dexa scan 8/18/05 normal       10 point review of systems negative other than symptoms noted above.   Constitutional, HEENT, CV, pulmonary, GI, , MS, Endo, Psych systems are all negative, except as otherwise noted.       OBJECTIVE:  /72  Pulse 60  Temp 97.7  F (36.5  C)  Resp 16  Ht 5' 5.75\" (1.67 m)  Wt 154 lb (69.9 kg)  SpO2 100%  BMI 25.05 kg/m2  CONSTITUTIONAL: Alert, well-nourished, " well-groomed, NAD  RESP: Lungs CTA. No wheeze, rhonchi, rales. Normal effort on room air. Equal lung sounds bilaterally.   CV: HRRR, normal S1, S2. No MRG. No peripheral edema.  DERM: No rashes or suspicious lesions  GI: Abdomen flat. BS x 4. No TTP. No HSM or masses. No CVAT      Diagnostic Tests:  Results for orders placed or performed in visit on 08/14/17   *UA reflex to Microscopic   Result Value Ref Range    Color Urine Yellow     Appearance Urine Clear     Glucose Urine Negative NEG mg/dL    Bilirubin Urine Negative NEG    Ketones Urine Negative NEG mg/dL    Specific Gravity Urine 1.015 1.003 - 1.035    Blood Urine Small (A) NEG    pH Urine 7.0 5.0 - 7.0 pH    Protein Albumin Urine Negative NEG mg/dL    Urobilinogen Urine 0.2 0.2 - 1.0 EU/dL    Nitrite Urine Negative NEG    Leukocyte Esterase Urine Negative NEG    Source Midstream Urine    Urine Microscopic   Result Value Ref Range    WBC Urine O - 2 0 - 2 /HPF    RBC Urine 2-5 (A) 0 - 2 /HPF    Bacteria Urine Moderate (A) NEG /HPF   Comprehensive metabolic panel (BMP + Alb, Alk Phos, ALT, AST, Total. Bili, TP)   Result Value Ref Range    Sodium 130 (L) 133 - 144 mmol/L    Potassium 3.6 3.4 - 5.3 mmol/L    Chloride 93 (L) 94 - 109 mmol/L    Carbon Dioxide 29 20 - 32 mmol/L    Anion Gap 8 3 - 14 mmol/L    Glucose 107 (H) 70 - 99 mg/dL    Urea Nitrogen 13 7 - 30 mg/dL    Creatinine 0.81 0.52 - 1.04 mg/dL    GFR Estimate 71 >60 mL/min/1.7m2    GFR Estimate If Black 86 >60 mL/min/1.7m2    Calcium 10.2 (H) 8.5 - 10.1 mg/dL    Bilirubin Total 0.4 0.2 - 1.3 mg/dL    Albumin 4.0 3.4 - 5.0 g/dL    Protein Total 7.7 6.8 - 8.8 g/dL    Alkaline Phosphatase 88 40 - 150 U/L    ALT 36 0 - 50 U/L    AST 24 0 - 45 U/L         ASSESSMENT/PLAN:  (R30.9) Pain with urination  (primary encounter diagnosis)  Comment: no UTI on UA.   Plan: *UA reflex to Microscopic, Urine Microscopic            (E83.52) Hypercalcemia  Comment: check more labs.   Plan: Calcium ionized, Comprehensive  metabolic panel         (BMP + Alb, Alk Phos, ALT, AST, Total. Bili,         TP), Parathyroid Hormone Intact            (E87.6) Hypokalemia  Comment:   Plan: Comprehensive metabolic panel (BMP + Alb, Alk         Phos, ALT, AST, Total. Bili, TP)            (G40.909) Seizure disorder (H)  Comment:   Plan: Has been stable on medications for 20+ yrs. Has had ONE grand mal sz. Had an EEG 8 yrs ago and she still had sz activity, so neuro told her to continue medications. PCP has been rx'ing.     (M81.0) Age related osteoporosis, unspecified pathological fracture presence  Comment: I am not entirely convinced her itching is from Boniva, but I have suggested she take a month off from the medication and see if symptoms resolve. If so, we can consider another option - fosamax. She started boniva d/t convenience with once monthly dosing.   Plan: Vitamin D Deficiency            (R31.9) Hematuria  Comment: noted to have micro hematuria in most all samples in chart, recommend recheck in a few weeks with NO symptoms to see if this resolves.   She says she saw urology a long time ago where they didn't do anything and just charged her a big bill.   We will need to consider CT and urology referral if blood cells still present.   Plan: *UA reflex to Microscopic and Culture ([place]         and [place] [facility] (except [place] and         [place])            (E87.1) Hyponatremia  Comment: noted today. Looks like her K+ and Na+ have fluctuated w/ each check.   She drinks a lot of free water, so I may have her back off on that slightly and recheck Na+ in a few weeks.   If persistently low, consider stopping chlorthalidone.   Plan: Basic metabolic panel  (Ca, Cl, CO2, Creat,         Gluc, K, Na, BUN)            (L29.9) Pruritic disorder  Comment:   Plan: Exam is normal, will try a month off of Boniva and see how her symptoms are.. Taking Zyrtec daily.       FOLLOW-UP: Labs in a couple of weeks, sooner PRN. Update me on itching.   The  patient agrees with this assessment and plan and agrees to call or return to the clinic with any questions or concerns or if their condition worsens.    HANNAH Oviedo, PA-C  [facility]

## 2017-09-22 ENCOUNTER — TELEPHONE (OUTPATIENT)
Dept: FAMILY MEDICINE | Facility: CLINIC | Age: 62
End: 2017-09-22

## 2017-09-22 ENCOUNTER — E-VISIT (OUTPATIENT)
Dept: FAMILY MEDICINE | Facility: CLINIC | Age: 62
End: 2017-09-22
Payer: COMMERCIAL

## 2017-09-22 DIAGNOSIS — N30.00 ACUTE CYSTITIS WITHOUT HEMATURIA: Primary | ICD-10-CM

## 2017-09-22 PROCEDURE — 99444 ZZC PHYSICIAN ONLINE EVALUATION & MANAGEMENT SERVICE: CPT | Performed by: PHYSICIAN ASSISTANT

## 2017-09-22 RX ORDER — SULFAMETHOXAZOLE/TRIMETHOPRIM 800-160 MG
1 TABLET ORAL 2 TIMES DAILY
Qty: 6 TABLET | Refills: 0 | Status: SHIPPED | OUTPATIENT
Start: 2017-09-22 | End: 2017-09-25

## 2017-09-22 NOTE — TELEPHONE ENCOUNTER
[facility].  Routing to Triage to see if this requires a phone call      Nancy BARON

## 2017-09-22 NOTE — TELEPHONE ENCOUNTER
"Triaged for UTI    Just had culture (8/14/17)  and it was negative. Now horrible pain with urination, cloudy urine, foul smelling urine. No fever. No back pain. No blood in urine. Has had this for 4 days, but states that she came in too soon last time.    Advised patient to go to  ([address] [place] [address]) due to severity of symptoms. States that she \"might tough it out and do E visit and lab only on Monday.\"    Advised to push fluids. Wait until feeling a better to get flu shot.  Lanny Vásquez RN    "

## 2017-10-10 ENCOUNTER — OFFICE VISIT (OUTPATIENT)
Dept: FAMILY MEDICINE | Facility: CLINIC | Age: 62
End: 2017-10-10
Payer: COMMERCIAL

## 2017-10-10 VITALS
HEART RATE: 61 BPM | BODY MASS INDEX: 25.33 KG/M2 | OXYGEN SATURATION: 100 % | WEIGHT: 157.6 LBS | HEIGHT: 66 IN | TEMPERATURE: 97.2 F | DIASTOLIC BLOOD PRESSURE: 87 MMHG | SYSTOLIC BLOOD PRESSURE: 131 MMHG

## 2017-10-10 DIAGNOSIS — Z78.9 DISCOMFORT: Primary | ICD-10-CM

## 2017-10-10 DIAGNOSIS — N30.01 ACUTE CYSTITIS WITH HEMATURIA: ICD-10-CM

## 2017-10-10 DIAGNOSIS — R82.90 NONSPECIFIC FINDING ON EXAMINATION OF URINE: ICD-10-CM

## 2017-10-10 LAB
ALBUMIN UR-MCNC: 30 MG/DL
APPEARANCE UR: CLEAR
BACTERIA #/AREA URNS HPF: ABNORMAL /HPF
BILIRUB UR QL STRIP: NEGATIVE
COLOR UR AUTO: YELLOW
GLUCOSE UR STRIP-MCNC: NEGATIVE MG/DL
HGB UR QL STRIP: ABNORMAL
KETONES UR STRIP-MCNC: ABNORMAL MG/DL
LEUKOCYTE ESTERASE UR QL STRIP: ABNORMAL
NITRATE UR QL: POSITIVE
NON-SQ EPI CELLS #/AREA URNS LPF: ABNORMAL /LPF
PH UR STRIP: 7.5 PH (ref 5–7)
RBC #/AREA URNS AUTO: ABNORMAL /HPF
SOURCE: ABNORMAL
SP GR UR STRIP: 1.01 (ref 1–1.03)
UROBILINOGEN UR STRIP-ACNC: 0.2 EU/DL (ref 0.2–1)
WBC #/AREA URNS AUTO: ABNORMAL /HPF

## 2017-10-10 PROCEDURE — 81001 URINALYSIS AUTO W/SCOPE: CPT | Performed by: PHYSICIAN ASSISTANT

## 2017-10-10 PROCEDURE — 87086 URINE CULTURE/COLONY COUNT: CPT | Performed by: PHYSICIAN ASSISTANT

## 2017-10-10 PROCEDURE — 87088 URINE BACTERIA CULTURE: CPT | Performed by: PHYSICIAN ASSISTANT

## 2017-10-10 PROCEDURE — 87186 SC STD MICRODIL/AGAR DIL: CPT | Performed by: PHYSICIAN ASSISTANT

## 2017-10-10 PROCEDURE — 99213 OFFICE O/P EST LOW 20 MIN: CPT | Performed by: PHYSICIAN ASSISTANT

## 2017-10-10 RX ORDER — CIPROFLOXACIN 500 MG/1
500 TABLET, FILM COATED ORAL 2 TIMES DAILY
Qty: 14 TABLET | Refills: 0 | Status: SHIPPED | OUTPATIENT
Start: 2017-10-10 | End: 2018-02-16

## 2017-10-10 NOTE — NURSING NOTE
"Chief Complaint   Patient presents with     RECHECK       Initial /87 (BP Location: Left arm, Patient Position: Chair, Cuff Size: Adult Regular)  Pulse 61  Temp 97.2  F (36.2  C) (Tympanic)  Ht 5' 5.75\" (1.67 m)  Wt 157 lb 9.6 oz (71.5 kg)  SpO2 100%  Breastfeeding? No  BMI 25.63 kg/m2 Estimated body mass index is 25.63 kg/(m^2) as calculated from the following:    Height as of this encounter: 5' 5.75\" (1.67 m).    Weight as of this encounter: 157 lb 9.6 oz (71.5 kg).  Medication Reconciliation: complete     eDepali Sen MA     "

## 2017-10-10 NOTE — PROGRESS NOTES
SUBJECTIVE:   Love Pinon is a 62 year old female who presents to clinic today for the following health issues:      Recheck: UTI - Constant pain - pain level 7/10 - constant feeling of needing to go to the restroom - trouble sleeping - discomfort     Bactrim helped a little, but then came back.   No back/abd pain/fever/nausea/vomiting/over hematuria.     Problem list and histories reviewed & adjusted, as indicated.  Additional history: as documented    Patient Active Problem List   Diagnosis     HYPERLIPIDEMIA LDL GOAL <130     Advance care planning     BPPV (benign paroxysmal positional vertigo)     Migraine headache without aura     Hypertension goal BP (blood pressure) < 140/90     Seizure disorder (H)     Osteoporosis     Chronic idiopathic constipation     Past Surgical History:   Procedure Laterality Date     APPENDECTOMY       CL AFF SURGICAL PATHOLOGY  1970    with cryotherapy     COLONOSCOPY      polyp excision, repeat  5 years      COLONOSCOPY N/A 9/15/2016    3 adenoma polyps, repeat in 3 years      D & C       HYSTEROSCOPY       TUBAL LIGATION         Social History   Substance Use Topics     Smoking status: Former Smoker     Years: 18.00     Types: Cigarettes     Quit date: 1989     Smokeless tobacco: Never Used     Alcohol use 1.8 oz/week     3 Standard drinks or equivalent per week      Comment: 2/week     Family History   Problem Relation Age of Onset     Cardiovascular Paternal Grandfather       of heart attack     Hypertension Father      Lipids Father      CANCER Father      MELANOMA     Genitourinary Problems Mother      CANCER Paternal Grandmother      Asthma Sister      Asthma Sister          Current Outpatient Prescriptions   Medication Sig Dispense Refill     ciprofloxacin (CIPRO) 500 MG tablet Take 1 tablet (500 mg) by mouth 2 times daily 14 tablet 0     calcium carbonate-vitamin D (CALCIUM + D) 600-200 MG-UNIT TABS Take 1 tablet by mouth daily 60 tablet 0      atenolol-chlorthalidone (TENORETIC 100) 100-25 MG per tablet Take 1 tablet by mouth daily 90 tablet 3     carBAMazepine (TEGRETOL) 200 MG tablet Take 1 tablet (200 mg) by mouth 2 times daily 180 tablet 3     topiramate (TOPAMAX) 25 MG tablet Take 1 tablet (25 mg) by mouth 2 times daily 180 tablet 3     simvastatin (ZOCOR) 40 MG tablet Take 1 tablet (40 mg) by mouth At Bedtime 90 tablet 3     IBANdronate (BONIVA) 150 MG tablet Take 1 tablet (150 mg) by mouth every 30 days Take 60 minutes before am meal with 8 oz. water. Remain upright for 30 minutes. 3 tablet 3     senna-docusate (SENOKOT-S;PERICOLACE) 8.6-50 MG per tablet Take 1 tablet by mouth daily 28 tablet 0     cetirizine (KLS ALLER-JUWAN) 10 MG tablet Take 10 mg by mouth daily.       Cranberry 500 MG CAPS Take  by mouth. Daily         FISH OIL 1000 MG OR CAPS Twice daily       Allergies   Allergen Reactions     Aramine [Metaraminol Bitartrate]      Penicillins      Hives, swelling     Labs reviewed in EPIC      Reviewed and updated as needed this visit by clinical staffTobacco  Allergies  Med Hx  Surg Hx  Fam Hx  Soc Hx      Reviewed and updated as needed this visit by Provider         Social History     Social History     Marital status: Single     Spouse name:      Number of children: 2     Years of education: N/A     Occupational History     sales  [employer]     Social History Main Topics     Smoking status: Former Smoker     Years: 18.00     Types: Cigarettes     Quit date: 12/31/1989     Smokeless tobacco: Never Used     Alcohol use 1.8 oz/week     3 Standard drinks or equivalent per week      Comment: 2/week     Drug use: No     Sexual activity: No     Other Topics Concern      Service No     Blood Transfusions No     Caffeine Concern No     Occupational Exposure No     Hobby Hazards No     Sleep Concern Yes     trouble sleeping      Stress Concern Yes     little     Weight Concern No     Special Diet  "No     Back Care Yes     low back pain would like refferal to chiroprator     Exercise Yes     alot work with job     Bike Helmet No     Seat Belt Yes     Self-Exams Yes     Parent/Sibling W/ Cabg, Mi Or Angioplasty Before 65f 55m? No     Social History Narrative     is alcoholic and had stage 4 lung cancer    Two children    Dexa scan 8/18/05 normal       10 point review of systems negative other than symptoms noted above.   Constitutional, HEENT, CV, pulmonary, GI, , MS, Endo, Psych systems are all negative, except as otherwise noted.       OBJECTIVE:  /87 (BP Location: Left arm, Patient Position: Chair, Cuff Size: Adult Regular)  Pulse 61  Temp 97.2  F (36.2  C) (Tympanic)  Ht 5' 5.75\" (1.67 m)  Wt 157 lb 9.6 oz (71.5 kg)  SpO2 100%  Breastfeeding? No  BMI 25.63 kg/m2  CONSTITUTIONAL: Alert, well-nourished, well-groomed, NAD  RESP: Lungs CTA. No wheeze, rhonchi, rales. Normal effort on room air. Equal lung sounds bilaterally.   CV: HRRR, normal S1, S2. No MRG. No peripheral edema.  DERM: No rashes or suspicious lesions  GI: Abdomen flat. BS x 4. No TTP. No HSM or masses. No CVAT      Diagnostic Tests:  Results for orders placed or performed in visit on 10/10/17   *UA reflex to Microscopic and Culture ([place] and [facility] (except [place] and [place])   Result Value Ref Range    Color Urine Yellow     Appearance Urine Clear     Glucose Urine Negative NEG^Negative mg/dL    Bilirubin Urine Negative NEG^Negative    Ketones Urine Trace (A) NEG^Negative mg/dL    Specific Gravity Urine 1.015 1.003 - 1.035    Blood Urine Moderate (A) NEG^Negative    pH Urine 7.5 (H) 5.0 - 7.0 pH    Protein Albumin Urine 30 (A) NEG^Negative mg/dL    Urobilinogen Urine 0.2 0.2 - 1.0 EU/dL    Nitrite Urine Positive (A) NEG^Negative    Leukocyte Esterase Urine Small (A) NEG^Negative    Source Midstream Urine    Urine Microscopic   Result Value Ref Range    WBC Urine 5-10 (A) OTO2^O - 2 /HPF    RBC Urine 10-25 " (A) OTO2^O - 2 /HPF    Squamous Epithelial /LPF Urine Few FEW^Few /LPF    Bacteria Urine Many (A) NEG^Negative /HPF         ASSESSMENT/PLAN:  (Z78.9) Discomfort  (primary encounter diagnosis)  Comment:   Plan: *UA reflex to Microscopic and Culture ([place]         [facility] [place] [facility] ([facility] and         [place]), Urine Microscopic            (R82.90) Nonspecific finding on examination of urine  Comment:   Plan: Urine Culture Aerobic Bacterial            (N30.01) Acute cystitis with hematuria  Comment: tx with cipro - reviewed all cautions for patient. She accepts the risks and expresses understanding.   Await culture.   Declines azo.   Plan: ciprofloxacin (CIPRO) 500 MG tablet        Saw urology in the past.       FOLLOW-UP: Routinely and sooner as needed.  The patient agrees with this assessment and plan and agrees to call or return to the clinic with any questions or concerns or if their condition worsens.    HANNAH Oviedo, PA-C  [facility]    ua

## 2017-10-10 NOTE — MR AVS SNAPSHOT
After Visit Summary   10/10/2017    Love Pinon    MRN: [id number]           Patient Information     Date Of Birth          1955        Visit Information        Provider Department      10/10/2017 3:00 PM Kenna Davis PA-C [facility] [place]        Today's Diagnoses     Discomfort    -  1    Nonspecific finding on examination of urine        Acute cystitis with hematuria           Follow-ups after your visit        Follow-up notes from your care team     Return if symptoms worsen or fail to improve.      Who to contact     If you have questions or need follow up information about today's clinic visit or your schedule please contact [facility] directly at [phone].  Normal or non-critical lab and imaging results will be communicated to you by Webtalkhart, letter or phone within 4 business days after the clinic has received the results. If you do not hear from us within 7 days, please contact the clinic through Webtalkhart or phone. If you have a critical or abnormal lab result, we will notify you by phone as soon as possible.  Submit refill requests through [employer] or call your pharmacy and they will forward the refill request to us. Please allow 3 business days for your refill to be completed.          Additional Information About Your Visit        MyChart Information     [employer] gives you secure access to your electronic health record. If you see a primary care provider, you can also send messages to your care team and make appointments. If you have questions, please call your primary care clinic.  If you do not have a primary care provider, please call [phone] and they will assist you.        Care EveryWhere ID     This is your Care EveryWhere ID. This could be used by other organizations to access your [facility] medical records  [id number]        Your Vitals Were     Pulse Temperature Height Pulse Oximetry Breastfeeding? BMI (Body Mass Index)  "   61 97.2  F (36.2  C) (Tympanic) 5' 5.75\" (1.67 m) 100% No 25.63 kg/m2       Blood Pressure from Last 3 Encounters:   10/10/17 131/87   08/14/17 116/72   02/22/17 115/82    Weight from Last 3 Encounters:   10/10/17 157 lb 9.6 oz (71.5 kg)   08/14/17 154 lb (69.9 kg)   02/22/17 150 lb (68 kg)              We Performed the Following     *UA reflex to Microscopic and Culture ([place] [facility] [place] (except [place] and [place])     Urine Culture Aerobic Bacterial     Urine Microscopic          Today's Medication Changes          These changes are accurate as of: 10/10/17  3:25 PM.  If you have any questions, ask your nurse or doctor.               Start taking these medicines.        Dose/Directions    ciprofloxacin 500 MG tablet   Commonly known as:  CIPRO   Used for:  Acute cystitis with hematuria   Started by:  Kenna Davis PA-C        Dose:  500 mg   Take 1 tablet (500 mg) by mouth 2 times daily   Quantity:  14 tablet   Refills:  0            Where to get your medicines      These medications were sent to [facility]/pharmacy #[id number] - [place], [place] - [address] [place] [facility] [place] [address]     Phone:  [phone]     ciprofloxacin 500 MG tablet                Primary Care Provider Office Phone # Fax #    Kenna Davis PA-C [phone]       [address] [place] [address] [address]  [place] [address]        Equal Access to Services     MISAEL LOMAX AH: Hadii aad ku hadasho Soomaali, waaxda luqadaha, qaybta kaalmada adeegyada, emma avila. So [facility] [phone].    ATENCIÓN: Si habla español, tiene a amanda disposición servicios gratuitos de asistencia lingüística. Llame al [phone].    We comply with applicable federal civil rights laws and [place] laws. We do not discriminate on the basis of race, color, national origin, age, disability, sex, sexual orientation, or gender identity.            Thank you!     Thank you for choosing " [facility] [place]  for your care. Our goal is always to provide you with excellent care. Hearing back from our patients is one way we can continue to improve our services. Please take a few minutes to complete the written survey that you may receive in the mail after your visit with us. Thank you!             Your Updated Medication List - Protect others around you: Learn how to safely use, store and throw away your medicines at www.disposemymeds.org.          This list is accurate as of: 10/10/17  3:25 PM.  Always use your most recent med list.                   Brand Name Dispense Instructions for use Diagnosis    atenolol-chlorthalidone 100-25 MG per tablet    TENORETIC 100    90 tablet    Take 1 tablet by mouth daily    Hypertension goal BP (blood pressure) < 140/90       calcium + D 600-200 MG-UNIT Tabs   Generic drug:  calcium carbonate-vitamin D     60 tablet    Take 1 tablet by mouth daily        carBAMazepine 200 MG tablet    TEGRETOL    180 tablet    Take 1 tablet (200 mg) by mouth 2 times daily    Seizure disorder (H)       ciprofloxacin 500 MG tablet    CIPRO    14 tablet    Take 1 tablet (500 mg) by mouth 2 times daily    Acute cystitis with hematuria       Cranberry 500 MG Caps      Take  by mouth. Daily        fish oil-omega-3 fatty acids 1000 MG capsule      Twice daily        IBANdronate 150 MG tablet    BONIVA    3 tablet    Take 1 tablet (150 mg) by mouth every 30 days Take 60 minutes before am meal with 8 oz. water. Remain upright for 30 minutes.    Osteoporosis       KLS ALLER-JUWAN 10 MG tablet   Generic drug:  cetirizine      Take 10 mg by mouth daily.        senna-docusate 8.6-50 MG per tablet    SENOKOT-S;PERICOLACE    28 tablet    Take 1 tablet by mouth daily        simvastatin 40 MG tablet    ZOCOR    90 tablet    Take 1 tablet (40 mg) by mouth At Bedtime    Hyperlipidemia LDL goal <130       topiramate 25 MG tablet    TOPAMAX    180 tablet    Take 1 tablet (25 mg) by mouth 2  times daily    Intractable migraine without aura and without status migrainosus

## 2017-10-11 LAB
BACTERIA SPEC CULT: ABNORMAL
SPECIMEN SOURCE: ABNORMAL

## 2017-10-20 DIAGNOSIS — G43.019 INTRACTABLE MIGRAINE WITHOUT AURA AND WITHOUT STATUS MIGRAINOSUS: ICD-10-CM

## 2017-10-23 RX ORDER — TOPIRAMATE 25 MG/1
25 TABLET, FILM COATED ORAL 2 TIMES DAILY
Qty: 180 TABLET | Refills: 3 | OUTPATIENT
Start: 2017-10-23

## 2017-10-23 NOTE — TELEPHONE ENCOUNTER
Duplicate- sent 02/22/17- info sent to pharmacy.  Carina Lozano,RN  [facility]  [phone]

## 2017-10-24 ENCOUNTER — ALLIED HEALTH/NURSE VISIT (OUTPATIENT)
Dept: NURSING | Facility: CLINIC | Age: 62
End: 2017-10-24
Payer: COMMERCIAL

## 2017-10-24 DIAGNOSIS — Z23 NEED FOR PROPHYLACTIC VACCINATION AND INOCULATION AGAINST INFLUENZA: Primary | ICD-10-CM

## 2017-10-24 PROCEDURE — 90471 IMMUNIZATION ADMIN: CPT

## 2017-10-24 PROCEDURE — 99207 ZZC NO CHARGE NURSE ONLY: CPT

## 2017-10-24 PROCEDURE — 90686 IIV4 VACC NO PRSV 0.5 ML IM: CPT

## 2017-10-24 NOTE — PROGRESS NOTES

## 2017-10-24 NOTE — MR AVS SNAPSHOT
After Visit Summary   10/24/2017    Love Pinon    MRN: [id number]           Patient Information     Date Of Birth          1955        Visit Information        Provider Department      10/24/2017 10:15 AM MCKAYLA ZARAGOZA/LPN [facility] [place]        Today's Diagnoses     Need for prophylactic vaccination and inoculation against influenza    -  1       Follow-ups after your visit        Who to contact     If you have questions or need follow up information about today's clinic visit or your schedule please contact [facility] [place] directly at [phone].  Normal or non-critical lab and imaging results will be communicated to you by Cortherahart, letter or phone within 4 business days after the clinic has received the results. If you do not hear from us within 7 days, please contact the clinic through Cortherahart or phone. If you have a critical or abnormal lab result, we will notify you by phone as soon as possible.  Submit refill requests through [email or web address] or call your pharmacy and they will forward the refill request to us. Please allow 3 business days for your refill to be completed.          Additional Information About Your Visit        MyChart Information     [email or web address] gives you secure access to your electronic health record. If you see a primary care provider, you can also send messages to your care team and make appointments. If you have questions, please call your primary care clinic.  If you do not have a primary care provider, please call [phone] and they will assist you.        Care EveryWhere ID     This is your Care EveryWhere ID. This could be used by other organizations to access your [facility] medical records  [id number]         Blood Pressure from Last 3 Encounters:   10/10/17 131/87   08/14/17 116/72   02/22/17 115/82    Weight from Last 3 Encounters:   10/10/17 157 lb 9.6 oz (71.5 kg)   08/14/17 154 lb (69.9 kg)   02/22/17 150 lb (68 kg)              We  Performed the Following     FLU VAC, SPLIT VIRUS IM > 3 YO (QUADRIVALENT) [17896]     Vaccine Administration, Initial [95379]        Primary Care Provider Office Phone # Fax #    Kenna Davis PA-C [phone]       [address]  [place] [address]        Equal Access to Services     [facility]: Hadii aad ku hadasho Soomaali, waaxda luqadaha, qaybta kaalmada adeegyada, waxay idiin hayaan adeeg kharash la'aan ah. So [facility] [phone].    ATENCIÓN: Si habla español, tiene a amanda disposición servicios gratuitos de asistencia lingüística. [facility] [phone].    We comply with applicable federal civil rights laws and [place] laws. We do not discriminate on the basis of race, color, national origin, age, disability, sex, sexual orientation, or gender identity.            Thank you!     [facility] your [facility]. Our goal is always to provide you with excellent care. Hearing back from our patients is one way we can continue to improve our services. Please take a few minutes to complete the written survey that you may receive in the mail after your visit with us. Thank you!             Your Updated Medication List - Protect others around you: Learn how to safely use, store and throw away your medicines at www.disposemymeds.org.          This list is accurate as of: 10/24/17 10:21 AM.  Always use your most recent med list.                   Brand Name Dispense Instructions for use Diagnosis    atenolol-chlorthalidone 100-25 MG per tablet    TENORETIC 100    90 tablet    Take 1 tablet by mouth daily    Hypertension goal BP (blood pressure) < 140/90       calcium + D 600-200 MG-UNIT Tabs   Generic drug:  calcium carbonate-vitamin D     60 tablet    Take 1 tablet by mouth daily        carBAMazepine 200 MG tablet    TEGRETOL    180 tablet    Take 1 tablet (200 mg) by mouth 2 times daily    Seizure disorder (H)       ciprofloxacin 500 MG tablet     CIPRO    14 tablet    Take 1 tablet (500 mg) by mouth 2 times daily    Acute cystitis with hematuria       Cranberry 500 MG Caps      Take  by mouth. Daily        fish oil-omega-3 fatty acids 1000 MG capsule      Twice daily        IBANdronate 150 MG tablet    BONIVA    3 tablet    Take 1 tablet (150 mg) by mouth every 30 days Take 60 minutes before am meal with 8 oz. water. Remain upright for 30 minutes.    Osteoporosis       KLS ALLER-JUWAN 10 MG tablet   Generic drug:  cetirizine      Take 10 mg by mouth daily.        senna-docusate 8.6-50 MG per tablet    SENOKOT-S;PERICOLACE    28 tablet    Take 1 tablet by mouth daily        simvastatin 40 MG tablet    ZOCOR    90 tablet    Take 1 tablet (40 mg) by mouth At Bedtime    Hyperlipidemia LDL goal <130       topiramate 25 MG tablet    TOPAMAX    180 tablet    Take 1 tablet (25 mg) by mouth 2 times daily    Intractable migraine without aura and without status migrainosus

## 2018-02-16 ENCOUNTER — OFFICE VISIT (OUTPATIENT)
Dept: FAMILY MEDICINE | Facility: CLINIC | Age: 63
End: 2018-02-16
Payer: COMMERCIAL

## 2018-02-16 VITALS
TEMPERATURE: 100.1 F | RESPIRATION RATE: 16 BRPM | SYSTOLIC BLOOD PRESSURE: 113 MMHG | DIASTOLIC BLOOD PRESSURE: 80 MMHG | BODY MASS INDEX: 23.95 KG/M2 | WEIGHT: 149 LBS | OXYGEN SATURATION: 100 % | HEIGHT: 66 IN | HEART RATE: 69 BPM

## 2018-02-16 DIAGNOSIS — J10.1 INFLUENZA A: Primary | ICD-10-CM

## 2018-02-16 DIAGNOSIS — R30.0 DYSURIA: ICD-10-CM

## 2018-02-16 LAB
ALBUMIN UR-MCNC: 30 MG/DL
AMORPH CRY #/AREA URNS HPF: ABNORMAL /HPF
APPEARANCE UR: CLEAR
BACTERIA #/AREA URNS HPF: ABNORMAL /HPF
BILIRUB UR QL STRIP: NEGATIVE
COLOR UR AUTO: YELLOW
FLUAV+FLUBV AG SPEC QL: NEGATIVE
FLUAV+FLUBV AG SPEC QL: POSITIVE
GLUCOSE UR STRIP-MCNC: NEGATIVE MG/DL
HGB UR QL STRIP: ABNORMAL
KETONES UR STRIP-MCNC: 40 MG/DL
LEUKOCYTE ESTERASE UR QL STRIP: ABNORMAL
NITRATE UR QL: NEGATIVE
NON-SQ EPI CELLS #/AREA URNS LPF: ABNORMAL /LPF
PH UR STRIP: 7.5 PH (ref 5–7)
RBC #/AREA URNS AUTO: ABNORMAL /HPF
SOURCE: ABNORMAL
SP GR UR STRIP: 1.01 (ref 1–1.03)
SPECIMEN SOURCE: ABNORMAL
UROBILINOGEN UR STRIP-ACNC: 0.2 EU/DL (ref 0.2–1)
WBC #/AREA URNS AUTO: ABNORMAL /HPF

## 2018-02-16 PROCEDURE — 87088 URINE BACTERIA CULTURE: CPT | Performed by: FAMILY MEDICINE

## 2018-02-16 PROCEDURE — 87186 SC STD MICRODIL/AGAR DIL: CPT | Mod: 59 | Performed by: FAMILY MEDICINE

## 2018-02-16 PROCEDURE — 87086 URINE CULTURE/COLONY COUNT: CPT | Performed by: FAMILY MEDICINE

## 2018-02-16 PROCEDURE — 81001 URINALYSIS AUTO W/SCOPE: CPT | Performed by: FAMILY MEDICINE

## 2018-02-16 PROCEDURE — 99213 OFFICE O/P EST LOW 20 MIN: CPT | Performed by: FAMILY MEDICINE

## 2018-02-16 PROCEDURE — 87804 INFLUENZA ASSAY W/OPTIC: CPT | Performed by: FAMILY MEDICINE

## 2018-02-16 RX ORDER — OSELTAMIVIR PHOSPHATE 75 MG/1
75 CAPSULE ORAL 2 TIMES DAILY
Qty: 10 CAPSULE | Refills: 0 | Status: SHIPPED | OUTPATIENT
Start: 2018-02-16 | End: 2018-02-22

## 2018-02-16 NOTE — MR AVS SNAPSHOT
"              After Visit Summary   2/16/2018    Love Pinon    MRN: [id number]           Patient Information     Date Of Birth          1955        Visit Information        Provider Department      2/16/2018 11:40 AM [facility] MD [facility] [place]        Today's Diagnoses     Influenza A    -  1    Dysuria           Follow-ups after your visit        Follow-up notes from your care team     Return if symptoms worsen or fail to improve.      Who to contact     If you have questions or need follow up information about today's clinic visit or your schedule please contact [facility] [place] directly at [phone].  Normal or non-critical lab and imaging results will be communicated to you by MyChart, letter or phone within 4 business days after the clinic has received the results. If you do not hear from us within 7 days, please contact the clinic through Apmetrixhart or phone. If you have a critical or abnormal lab result, we will notify you by phone as soon as possible.  Submit refill requests through [employer] or call your pharmacy and they will forward the refill request to us. Please allow 3 business days for your refill to be completed.          Additional Information About Your Visit        MyChart Information     [employer] gives you secure access to your electronic health record. If you see a primary care provider, you can also send messages to your care team and make appointments. If you have questions, please call your primary care clinic.  If you do not have a primary care provider, please call [phone] and they will assist you.        Care EveryWhere ID     This is your Care EveryWhere ID. This could be used by other organizations to access your [facility] medical records  [id number]        Your Vitals Were     Pulse Temperature Respirations Height Pulse Oximetry BMI (Body Mass Index)    69 100.1  F (37.8  C) (Tympanic) 16 5' 5.75\" (1.67 m) 100% 24.23 kg/m2       Blood " Pressure from Last 3 Encounters:   02/16/18 113/80   10/10/17 131/87   08/14/17 116/72    Weight from Last 3 Encounters:   02/16/18 149 lb (67.6 kg)   10/10/17 157 lb 9.6 oz (71.5 kg)   08/14/17 154 lb (69.9 kg)              We Performed the Following     Influenza A/B antigen     UA reflex to Microscopic and Culture     Urine Microscopic          Today's Medication Changes          These changes are accurate as of 2/16/18 12:16 PM.  If you have any questions, ask your nurse or doctor.               Start taking these medicines.        Dose/Directions    oseltamivir 75 MG capsule   Commonly known as:  TAMIFLU   Used for:  Influenza A   Started by:  Adrien Luna MD        Dose:  75 mg   Take 1 capsule (75 mg) by mouth 2 times daily   Quantity:  10 capsule   Refills:  0            Where to get your medicines      These medications were sent to [id number] - [place], [place] - [address] AT [facility] [address] [facility]  [address], [place] [address]     Phone:  [phone]     oseltamivir 75 MG capsule                Primary Care Provider    Kenna Davis PA-C       No address on file        Equal Access to Services     RINKU LOMAX AH: Hadii aad ku hadasho Soomaali, waaxda luqadaha, qaybta kaalmada adeegyada, waxay cadencein haysrinathn zac avila. So [facility] [phone].    ATENCIÓN: Si habla español, tiene a amanda disposición servicios gratuitos de asistencia lingüística. Llame al [phone].    We comply with applicable federal civil rights laws and [place] laws. We do not discriminate on the basis of race, color, national origin, age, disability, sex, sexual orientation, or gender identity.            Thank you!     Thank you for choosing [facility] [place]  for your care. Our goal is always to provide you with excellent care. Hearing back from our patients is one way we can continue to improve our services. Please take a few minutes to  complete the written survey that you may receive in the mail after your visit with us. Thank you!             Your Updated Medication List - Protect others around you: Learn how to safely use, store and throw away your medicines at www.disposemymeds.org.          This list is accurate as of 2/16/18 12:16 PM.  Always use your most recent med list.                   Brand Name Dispense Instructions for use Diagnosis    atenolol-chlorthalidone 100-25 MG per tablet    TENORETIC 100    90 tablet    Take 1 tablet by mouth daily    Hypertension goal BP (blood pressure) < 140/90       calcium + D 600-200 MG-UNIT Tabs   Generic drug:  calcium carbonate-vitamin D     60 tablet    Take 1 tablet by mouth daily        carBAMazepine 200 MG tablet    TEGRETOL    180 tablet    Take 1 tablet (200 mg) by mouth 2 times daily    Seizure disorder (H)       Cranberry 500 MG Caps      Take  by mouth. Daily        fish oil-omega-3 fatty acids 1000 MG capsule      Twice daily        IBANdronate 150 MG tablet    BONIVA    3 tablet    Take 1 tablet (150 mg) by mouth every 30 days Take 60 minutes before am meal with 8 oz. water. Remain upright for 30 minutes.    Osteoporosis       KLS ALLER-JUWAN 10 MG tablet   Generic drug:  cetirizine      Take 10 mg by mouth daily.        oseltamivir 75 MG capsule    TAMIFLU    10 capsule    Take 1 capsule (75 mg) by mouth 2 times daily    Influenza A       senna-docusate 8.6-50 MG per tablet    SENOKOT-S;PERICOLACE    28 tablet    Take 1 tablet by mouth daily        simvastatin 40 MG tablet    ZOCOR    90 tablet    Take 1 tablet (40 mg) by mouth At Bedtime    Hyperlipidemia LDL goal <130       topiramate 25 MG tablet    TOPAMAX    180 tablet    Take 1 tablet (25 mg) by mouth 2 times daily    Intractable migraine without aura and without status migrainosus

## 2018-02-16 NOTE — NURSING NOTE
"Chief Complaint   Patient presents with     URI     UTI       Initial /80 (Cuff Size: Adult Regular)  Pulse 69  Temp 100.1  F (37.8  C) (Tympanic)  Resp 16  Ht 5' 5.75\" (1.67 m)  Wt 149 lb (67.6 kg)  SpO2 100%  BMI 24.23 kg/m2 Estimated body mass index is 24.23 kg/(m^2) as calculated from the following:    Height as of this encounter: 5' 5.75\" (1.67 m).    Weight as of this encounter: 149 lb (67.6 kg).  Medication Reconciliation: complete   Kaila Tabares, CMA    "

## 2018-02-16 NOTE — PROGRESS NOTES
SUBJECTIVE:   Love Pinon is a 62 year old female who presents to clinic today for the following health issues:      RESPIRATORY SYMPTOMS      Duration: 3 days     Description  sore throat, cough, fatigue/malaise and myalgias    Severity: moderate    Accompanying signs and symptoms: SOB    History (predisposing factors):  none    Precipitating or alleviating factors: None    Therapies tried and outcome:  Tylenol     Problem list and histories reviewed & adjusted, as indicated.  Additional history: as documented    Patient Active Problem List   Diagnosis     HYPERLIPIDEMIA LDL GOAL <130     Advance care planning     BPPV (benign paroxysmal positional vertigo)     Migraine headache without aura     Hypertension goal BP (blood pressure) < 140/90     Seizure disorder (H)     Osteoporosis     Chronic idiopathic constipation     Past Surgical History:   Procedure Laterality Date     APPENDECTOMY       CL AFF SURGICAL PATHOLOGY  1970    with cryotherapy     COLONOSCOPY      polyp excision, repeat  5 years      COLONOSCOPY N/A 9/15/2016    3 adenoma polyps, repeat in 3 years      D & C       HYSTEROSCOPY       TUBAL LIGATION         Social History   Substance Use Topics     Smoking status: Former Smoker     Years: 18.00     Types: Cigarettes     Quit date: 1989     Smokeless tobacco: Never Used     Alcohol use 1.8 oz/week     3 Standard drinks or equivalent per week      Comment: 2/week     Family History   Problem Relation Age of Onset     Cardiovascular Paternal Grandfather       of heart attack     Hypertension Father      Lipids Father      CANCER Father      MELANOMA     Genitourinary Problems Mother      CANCER Paternal Grandmother      Asthma Sister      Asthma Sister          Current Outpatient Prescriptions   Medication Sig Dispense Refill     oseltamivir (TAMIFLU) 75 MG capsule Take 1 capsule (75 mg) by mouth 2 times daily 10 capsule 0     calcium carbonate-vitamin D (CALCIUM + D) 600-200  MG-UNIT TABS Take 1 tablet by mouth daily 60 tablet 0     atenolol-chlorthalidone (TENORETIC 100) 100-25 MG per tablet Take 1 tablet by mouth daily 90 tablet 3     carBAMazepine (TEGRETOL) 200 MG tablet Take 1 tablet (200 mg) by mouth 2 times daily 180 tablet 3     topiramate (TOPAMAX) 25 MG tablet Take 1 tablet (25 mg) by mouth 2 times daily 180 tablet 3     simvastatin (ZOCOR) 40 MG tablet Take 1 tablet (40 mg) by mouth At Bedtime 90 tablet 3     senna-docusate (SENOKOT-S;PERICOLACE) 8.6-50 MG per tablet Take 1 tablet by mouth daily 28 tablet 0     cetirizine (KLS ALLER-JUWAN) 10 MG tablet Take 10 mg by mouth daily.       Cranberry 500 MG CAPS Take  by mouth. Daily         FISH OIL 1000 MG OR CAPS Twice daily       IBANdronate (BONIVA) 150 MG tablet Take 1 tablet (150 mg) by mouth every 30 days Take 60 minutes before am meal with 8 oz. water. Remain upright for 30 minutes. (Patient not taking: Reported on 2/16/2018) 3 tablet 3     Allergies   Allergen Reactions     Aramine [Metaraminol Bitartrate]      Penicillins      Hives, swelling     Recent Labs   Lab Test  08/14/17   0923  02/22/17   0847  02/09/16   0902  07/23/14   1144   LDL   --   101*  103*  101   HDL   --   106  100  99   TRIG   --   96  93  83   ALT  36  33  33  51*   CR  0.81  0.70  0.63  0.68   GFRESTIMATED  71  85  >90  Non  GFR Calc    88   GFRESTBLACK  86  >90   GFR Calc    >90   GFR Calc    >90   POTASSIUM  3.6  3.2*  3.6  3.1*   TSH   --    --   1.79  0.57      BP Readings from Last 3 Encounters:   02/16/18 113/80   10/10/17 131/87   08/14/17 116/72    Wt Readings from Last 3 Encounters:   02/16/18 149 lb (67.6 kg)   10/10/17 157 lb 9.6 oz (71.5 kg)   08/14/17 154 lb (69.9 kg)                    Reviewed and updated as needed this visit by clinical staff       Reviewed and updated as needed this visit by Provider         ROS:  Constitutional, HEENT, cardiovascular, pulmonary, gi and gu systems are  "negative, except as otherwise noted.    OBJECTIVE:     /80 (Cuff Size: Adult Regular)  Pulse 69  Temp 100.1  F (37.8  C) (Tympanic)  Resp 16  Ht 5' 5.75\" (1.67 m)  Wt 149 lb (67.6 kg)  SpO2 100%  BMI 24.23 kg/m2  Body mass index is 24.23 kg/(m^2).  GENERAL: healthy, alert and no distress  NECK: no adenopathy, no asymmetry, masses, or scars and thyroid normal to palpation  RESP: lungs clear to auscultation - no rales, rhonchi or wheezes  CV: regular rate and rhythm, normal S1 S2, no S3 or S4, no murmur, click or rub, no peripheral edema and peripheral pulses strong  ABDOMEN: soft, nontender, no hepatosplenomegaly, no masses and bowel sounds normal  MS: no gross musculoskeletal defects noted, no edema        ASSESSMENT/PLAN:   Love was seen today for uri and uti.    Diagnoses and all orders for this visit:    Influenza A  -     oseltamivir (TAMIFLU) 75 MG capsule; Take 1 capsule (75 mg) by mouth 2 times daily    Fever  -     Influenza A/B antigen  -     Urine Microscopic    Dysuria  -     UA reflex to Microscopic and Culture          Adrien Luna MD  [facility]    "

## 2018-02-19 ENCOUNTER — TELEPHONE (OUTPATIENT)
Dept: FAMILY MEDICINE | Facility: CLINIC | Age: 63
End: 2018-02-19

## 2018-02-19 DIAGNOSIS — N30.00 ACUTE CYSTITIS WITHOUT HEMATURIA: ICD-10-CM

## 2018-02-19 DIAGNOSIS — R11.0 NAUSEA: Primary | ICD-10-CM

## 2018-02-19 LAB
BACTERIA SPEC CULT: ABNORMAL
BACTERIA SPEC CULT: ABNORMAL
SPECIMEN SOURCE: ABNORMAL

## 2018-02-19 RX ORDER — CIPROFLOXACIN 500 MG/1
500 TABLET, FILM COATED ORAL 2 TIMES DAILY
Qty: 10 TABLET | Refills: 0 | Status: ON HOLD | OUTPATIENT
Start: 2018-02-19 | End: 2018-02-25

## 2018-02-19 RX ORDER — PROCHLORPERAZINE MALEATE 10 MG
10 TABLET ORAL EVERY 8 HOURS PRN
Qty: 20 TABLET | Refills: 1 | Status: SHIPPED | OUTPATIENT
Start: 2018-02-19 | End: 2018-02-26

## 2018-02-19 NOTE — TELEPHONE ENCOUNTER
Compazine prescribed  Please check on with her about her urinary sx as I mentioned in the urine culture results note

## 2018-02-19 NOTE — TELEPHONE ENCOUNTER
Pt states that she has Influenza A, states that it was dx'd here in the clinic by Dr Luna.  Pt states that she was sent home on Tamiflu and states that the only other medication that she is taking at this time is Tylenol when she gets a headache.  Pt c/o nausea that started on Friday evening.  Pt denies abdominal pain, vomiting or diarrhea.  Pt denies fever.  Pt is tolerating and drinking fluids.    Please advise as necessary for nausea.    Nancy Childers RN  [employer]      LOV 1/16/18       ASSESSMENT/PLAN:   Love was seen today for uri and uti.     Diagnoses and all orders for this visit:     Influenza A  -     oseltamivir (TAMIFLU) 75 MG capsule; Take 1 capsule (75 mg) by mouth 2 times daily     Fever  -     Influenza A/B antigen  -     Urine Microscopic     Dysuria  -     UA reflex to Microscopic and Culture              Adrien Luna MD  [facility]

## 2018-02-19 NOTE — TELEPHONE ENCOUNTER
Patient informed and agreed with plan.  No further questions at this time    Luanne Oleary RN

## 2018-02-19 NOTE — TELEPHONE ENCOUNTER
Patient informed.  States that her urinary symptoms are still the same, still have burning sensation with urination with a little decreased in output  Advised hydration.   Please review and advise as appropriate.  Pharmacy pended.  Thank you  Triage to call with response - cell phone - OK to leave detail message    Luanne Oleary RN

## 2018-02-22 ENCOUNTER — APPOINTMENT (OUTPATIENT)
Dept: GENERAL RADIOLOGY | Facility: CLINIC | Age: 63
DRG: 645 | End: 2018-02-22
Attending: EMERGENCY MEDICINE
Payer: COMMERCIAL

## 2018-02-22 ENCOUNTER — HOSPITAL ENCOUNTER (INPATIENT)
Facility: CLINIC | Age: 63
LOS: 3 days | Discharge: HOME | DRG: 645 | End: 2018-02-25
Attending: EMERGENCY MEDICINE | Admitting: HOSPITALIST
Payer: COMMERCIAL

## 2018-02-22 DIAGNOSIS — E87.6 HYPOKALEMIA: ICD-10-CM

## 2018-02-22 DIAGNOSIS — E87.1 HYPONATREMIA: ICD-10-CM

## 2018-02-22 DIAGNOSIS — I10 HYPERTENSION GOAL BP (BLOOD PRESSURE) < 140/90: Primary | ICD-10-CM

## 2018-02-22 LAB
ALBUMIN UR-MCNC: 100 MG/DL
ANION GAP SERPL CALCULATED.3IONS-SCNC: 11 MMOL/L (ref 3–14)
ANION GAP SERPL CALCULATED.3IONS-SCNC: 14 MMOL/L (ref 3–14)
ANION GAP SERPL CALCULATED.3IONS-SCNC: NORMAL MMOL/L (ref 6–17)
APPEARANCE UR: CLEAR
BASOPHILS # BLD AUTO: 0 10E9/L (ref 0–0.2)
BASOPHILS NFR BLD AUTO: 0 %
BILIRUB UR QL STRIP: ABNORMAL
BUN SERPL-MCNC: 10 MG/DL (ref 7–30)
BUN SERPL-MCNC: 7 MG/DL (ref 7–30)
BUN SERPL-MCNC: NORMAL MG/DL (ref 7–30)
CALCIUM SERPL-MCNC: 8.5 MG/DL (ref 8.5–10.1)
CALCIUM SERPL-MCNC: 8.8 MG/DL (ref 8.5–10.1)
CALCIUM SERPL-MCNC: NORMAL MG/DL (ref 8.5–10.1)
CASTS #/AREA URNS LPF: ABNORMAL /LPF (ref 0–2)
CHLORIDE SERPL-SCNC: 74 MMOL/L (ref 94–109)
CHLORIDE SERPL-SCNC: 81 MMOL/L (ref 94–109)
CHLORIDE SERPL-SCNC: NORMAL MMOL/L (ref 94–109)
CO2 SERPL-SCNC: 25 MMOL/L (ref 20–32)
CO2 SERPL-SCNC: 26 MMOL/L (ref 20–32)
CO2 SERPL-SCNC: NORMAL MMOL/L (ref 20–32)
COLOR UR AUTO: YELLOW
CREAT SERPL-MCNC: 0.63 MG/DL (ref 0.52–1.04)
CREAT SERPL-MCNC: 0.66 MG/DL (ref 0.52–1.04)
CREAT SERPL-MCNC: NORMAL MG/DL (ref 0.52–1.04)
DIFFERENTIAL METHOD BLD: ABNORMAL
EOSINOPHIL # BLD AUTO: 0 10E9/L (ref 0–0.7)
EOSINOPHIL NFR BLD AUTO: 0 %
ERYTHROCYTE [DISTWIDTH] IN BLOOD BY AUTOMATED COUNT: 11.4 % (ref 10–15)
GFR SERPL CREATININE-BSD FRML MDRD: >90 ML/MIN/1.7M2
GFR SERPL CREATININE-BSD FRML MDRD: >90 ML/MIN/1.7M2
GFR SERPL CREATININE-BSD FRML MDRD: NORMAL ML/MIN/1.7M2
GLUCOSE SERPL-MCNC: 105 MG/DL (ref 70–99)
GLUCOSE SERPL-MCNC: 132 MG/DL (ref 70–99)
GLUCOSE SERPL-MCNC: NORMAL MG/DL (ref 70–99)
GLUCOSE UR STRIP-MCNC: NEGATIVE MG/DL
HCT VFR BLD AUTO: 37.8 % (ref 35–47)
HGB BLD-MCNC: 14.9 G/DL (ref 11.7–15.7)
HGB UR QL STRIP: ABNORMAL
IMM GRANULOCYTES # BLD: 0 10E9/L (ref 0–0.4)
IMM GRANULOCYTES NFR BLD: 0 %
KETONES UR STRIP-MCNC: 40 MG/DL
LEUKOCYTE ESTERASE UR QL STRIP: NEGATIVE
LYMPHOCYTES # BLD AUTO: 1.2 10E9/L (ref 0.8–5.3)
LYMPHOCYTES NFR BLD AUTO: 31.8 %
MAGNESIUM SERPL-MCNC: 1.8 MG/DL (ref 1.6–2.3)
MCH RBC QN AUTO: 34.3 PG (ref 26.5–33)
MCHC RBC AUTO-ENTMCNC: ABNORMAL G/DL (ref 31.5–36.5)
MCV RBC AUTO: 87 FL (ref 78–100)
MICROCYTES BLD QL SMEAR: PRESENT
MONOCYTES # BLD AUTO: 0.6 10E9/L (ref 0–1.3)
MONOCYTES NFR BLD AUTO: 16.5 %
MUCOUS THREADS #/AREA URNS LPF: PRESENT /LPF
NEUTROPHILS # BLD AUTO: 2 10E9/L (ref 1.6–8.3)
NEUTROPHILS NFR BLD AUTO: 51.7 %
NITRATE UR QL: NEGATIVE
NRBC # BLD AUTO: 0 10*3/UL
NRBC BLD AUTO-RTO: 0 /100
PH UR STRIP: 7.5 PH (ref 5–7)
PLATELET # BLD AUTO: 289 10E9/L (ref 150–450)
PLATELET # BLD EST: ABNORMAL 10*3/UL
POTASSIUM SERPL-SCNC: 2.1 MMOL/L (ref 3.4–5.3)
POTASSIUM SERPL-SCNC: 3 MMOL/L (ref 3.4–5.3)
POTASSIUM SERPL-SCNC: 3.3 MMOL/L (ref 3.4–5.3)
POTASSIUM SERPL-SCNC: NORMAL MMOL/L (ref 3.4–5.3)
RBC # BLD AUTO: 4.34 10E12/L (ref 3.8–5.2)
RBC #/AREA URNS AUTO: ABNORMAL /HPF
SODIUM SERPL-SCNC: 113 MMOL/L (ref 133–144)
SODIUM SERPL-SCNC: 118 MMOL/L (ref 133–144)
SODIUM SERPL-SCNC: 120 MMOL/L (ref 133–144)
SODIUM SERPL-SCNC: NORMAL MMOL/L (ref 133–144)
SOURCE: ABNORMAL
SP GR UR STRIP: 1.01 (ref 1–1.03)
UROBILINOGEN UR STRIP-ACNC: 0.2 EU/DL (ref 0.2–1)
WBC # BLD AUTO: 3.8 10E9/L (ref 4–11)
WBC #/AREA URNS AUTO: ABNORMAL /HPF

## 2018-02-22 PROCEDURE — 25000128 H RX IP 250 OP 636: Performed by: HOSPITALIST

## 2018-02-22 PROCEDURE — 81001 URINALYSIS AUTO W/SCOPE: CPT | Performed by: EMERGENCY MEDICINE

## 2018-02-22 PROCEDURE — 87086 URINE CULTURE/COLONY COUNT: CPT | Performed by: HOSPITALIST

## 2018-02-22 PROCEDURE — 84132 ASSAY OF SERUM POTASSIUM: CPT | Performed by: HOSPITALIST

## 2018-02-22 PROCEDURE — 85025 COMPLETE CBC W/AUTO DIFF WBC: CPT | Performed by: EMERGENCY MEDICINE

## 2018-02-22 PROCEDURE — 80048 BASIC METABOLIC PNL TOTAL CA: CPT | Performed by: EMERGENCY MEDICINE

## 2018-02-22 PROCEDURE — 25000128 H RX IP 250 OP 636: Performed by: EMERGENCY MEDICINE

## 2018-02-22 PROCEDURE — 36415 COLL VENOUS BLD VENIPUNCTURE: CPT | Performed by: HOSPITALIST

## 2018-02-22 PROCEDURE — 12000000 ZZH R&B MED SURG/OB

## 2018-02-22 PROCEDURE — 71046 X-RAY EXAM CHEST 2 VIEWS: CPT

## 2018-02-22 PROCEDURE — 93005 ELECTROCARDIOGRAM TRACING: CPT

## 2018-02-22 PROCEDURE — 96360 HYDRATION IV INFUSION INIT: CPT

## 2018-02-22 PROCEDURE — 83735 ASSAY OF MAGNESIUM: CPT | Performed by: EMERGENCY MEDICINE

## 2018-02-22 PROCEDURE — 96361 HYDRATE IV INFUSION ADD-ON: CPT

## 2018-02-22 PROCEDURE — 84295 ASSAY OF SERUM SODIUM: CPT | Performed by: HOSPITALIST

## 2018-02-22 PROCEDURE — 25000132 ZZH RX MED GY IP 250 OP 250 PS 637: Performed by: EMERGENCY MEDICINE

## 2018-02-22 PROCEDURE — 99207 ZZC CDG-HISTORY COMP: MEETS EXP. PROBLEM FOCUSED - DOWN CODED LACK OF HPI: CPT | Performed by: HOSPITALIST

## 2018-02-22 PROCEDURE — 99221 1ST HOSP IP/OBS SF/LOW 40: CPT | Mod: AI | Performed by: HOSPITALIST

## 2018-02-22 PROCEDURE — 25000132 ZZH RX MED GY IP 250 OP 250 PS 637: Performed by: HOSPITALIST

## 2018-02-22 PROCEDURE — 99285 EMERGENCY DEPT VISIT HI MDM: CPT | Mod: 25

## 2018-02-22 RX ORDER — HYDRALAZINE HYDROCHLORIDE 20 MG/ML
10 INJECTION INTRAMUSCULAR; INTRAVENOUS EVERY 4 HOURS PRN
Status: DISCONTINUED | OUTPATIENT
Start: 2018-02-22 | End: 2018-02-25 | Stop reason: HOSPADM

## 2018-02-22 RX ORDER — POLYETHYLENE GLYCOL 3350 17 G/17G
17 POWDER, FOR SOLUTION ORAL DAILY PRN
Status: DISCONTINUED | OUTPATIENT
Start: 2018-02-22 | End: 2018-02-25 | Stop reason: HOSPADM

## 2018-02-22 RX ORDER — ONDANSETRON 2 MG/ML
4 INJECTION INTRAMUSCULAR; INTRAVENOUS EVERY 6 HOURS PRN
Status: DISCONTINUED | OUTPATIENT
Start: 2018-02-22 | End: 2018-02-25 | Stop reason: HOSPADM

## 2018-02-22 RX ORDER — POTASSIUM CHLORIDE 7.45 MG/ML
10 INJECTION INTRAVENOUS
Status: DISCONTINUED | OUTPATIENT
Start: 2018-02-22 | End: 2018-02-25 | Stop reason: HOSPADM

## 2018-02-22 RX ORDER — AMOXICILLIN 250 MG
2 CAPSULE ORAL 2 TIMES DAILY PRN
Status: DISCONTINUED | OUTPATIENT
Start: 2018-02-22 | End: 2018-02-25 | Stop reason: HOSPADM

## 2018-02-22 RX ORDER — BISACODYL 10 MG
10 SUPPOSITORY, RECTAL RECTAL DAILY PRN
Status: DISCONTINUED | OUTPATIENT
Start: 2018-02-22 | End: 2018-02-25 | Stop reason: HOSPADM

## 2018-02-22 RX ORDER — SODIUM CHLORIDE 9 MG/ML
1000 INJECTION, SOLUTION INTRAVENOUS CONTINUOUS
Status: DISCONTINUED | OUTPATIENT
Start: 2018-02-22 | End: 2018-02-22

## 2018-02-22 RX ORDER — POTASSIUM CHLORIDE 1.5 G/1.58G
60 POWDER, FOR SOLUTION ORAL ONCE
Status: COMPLETED | OUTPATIENT
Start: 2018-02-22 | End: 2018-02-22

## 2018-02-22 RX ORDER — NALOXONE HYDROCHLORIDE 0.4 MG/ML
.1-.4 INJECTION, SOLUTION INTRAMUSCULAR; INTRAVENOUS; SUBCUTANEOUS
Status: DISCONTINUED | OUTPATIENT
Start: 2018-02-22 | End: 2018-02-25 | Stop reason: HOSPADM

## 2018-02-22 RX ORDER — POTASSIUM CL/LIDO/0.9 % NACL 10MEQ/0.1L
10 INTRAVENOUS SOLUTION, PIGGYBACK (ML) INTRAVENOUS
Status: DISCONTINUED | OUTPATIENT
Start: 2018-02-22 | End: 2018-02-25 | Stop reason: HOSPADM

## 2018-02-22 RX ORDER — POTASSIUM CHLORIDE 1.5 G/1.58G
20-40 POWDER, FOR SOLUTION ORAL
Status: DISCONTINUED | OUTPATIENT
Start: 2018-02-22 | End: 2018-02-25 | Stop reason: HOSPADM

## 2018-02-22 RX ORDER — SODIUM CHLORIDE 9 MG/ML
INJECTION, SOLUTION INTRAVENOUS CONTINUOUS
Status: DISCONTINUED | OUTPATIENT
Start: 2018-02-22 | End: 2018-02-23

## 2018-02-22 RX ORDER — POTASSIUM CHLORIDE 1.5 G/1.58G
40 POWDER, FOR SOLUTION ORAL ONCE
Status: COMPLETED | OUTPATIENT
Start: 2018-02-22 | End: 2018-02-22

## 2018-02-22 RX ORDER — ACETAMINOPHEN 650 MG/1
650 SUPPOSITORY RECTAL EVERY 4 HOURS PRN
Status: DISCONTINUED | OUTPATIENT
Start: 2018-02-22 | End: 2018-02-25 | Stop reason: HOSPADM

## 2018-02-22 RX ORDER — POTASSIUM CHLORIDE 1500 MG/1
20-40 TABLET, EXTENDED RELEASE ORAL
Status: DISCONTINUED | OUTPATIENT
Start: 2018-02-22 | End: 2018-02-25 | Stop reason: HOSPADM

## 2018-02-22 RX ORDER — ACETAMINOPHEN 325 MG/1
650 TABLET ORAL EVERY 4 HOURS PRN
Status: DISCONTINUED | OUTPATIENT
Start: 2018-02-22 | End: 2018-02-25 | Stop reason: HOSPADM

## 2018-02-22 RX ORDER — TOPIRAMATE 25 MG/1
25 TABLET, FILM COATED ORAL 2 TIMES DAILY
Status: DISCONTINUED | OUTPATIENT
Start: 2018-02-23 | End: 2018-02-25 | Stop reason: HOSPADM

## 2018-02-22 RX ORDER — SIMVASTATIN 20 MG
40 TABLET ORAL AT BEDTIME
Status: DISCONTINUED | OUTPATIENT
Start: 2018-02-23 | End: 2018-02-25 | Stop reason: HOSPADM

## 2018-02-22 RX ORDER — POTASSIUM CHLORIDE 29.8 MG/ML
20 INJECTION INTRAVENOUS
Status: DISCONTINUED | OUTPATIENT
Start: 2018-02-22 | End: 2018-02-25 | Stop reason: HOSPADM

## 2018-02-22 RX ORDER — LABETALOL HYDROCHLORIDE 5 MG/ML
10 INJECTION, SOLUTION INTRAVENOUS
Status: DISCONTINUED | OUTPATIENT
Start: 2018-02-22 | End: 2018-02-25 | Stop reason: HOSPADM

## 2018-02-22 RX ORDER — MAGNESIUM SULFATE HEPTAHYDRATE 40 MG/ML
4 INJECTION, SOLUTION INTRAVENOUS EVERY 4 HOURS PRN
Status: DISCONTINUED | OUTPATIENT
Start: 2018-02-22 | End: 2018-02-25 | Stop reason: HOSPADM

## 2018-02-22 RX ORDER — LIDOCAINE 40 MG/G
CREAM TOPICAL
Status: DISCONTINUED | OUTPATIENT
Start: 2018-02-22 | End: 2018-02-25 | Stop reason: HOSPADM

## 2018-02-22 RX ORDER — AMOXICILLIN 250 MG
1 CAPSULE ORAL 2 TIMES DAILY PRN
Status: DISCONTINUED | OUTPATIENT
Start: 2018-02-22 | End: 2018-02-25 | Stop reason: HOSPADM

## 2018-02-22 RX ORDER — CARBAMAZEPINE 200 MG/1
200 TABLET ORAL 2 TIMES DAILY
Status: DISCONTINUED | OUTPATIENT
Start: 2018-02-23 | End: 2018-02-23

## 2018-02-22 RX ORDER — ONDANSETRON 4 MG/1
4 TABLET, ORALLY DISINTEGRATING ORAL EVERY 6 HOURS PRN
Status: DISCONTINUED | OUTPATIENT
Start: 2018-02-22 | End: 2018-02-25 | Stop reason: HOSPADM

## 2018-02-22 RX ADMIN — SODIUM CHLORIDE: 9 INJECTION, SOLUTION INTRAVENOUS at 20:02

## 2018-02-22 RX ADMIN — POTASSIUM CHLORIDE 60 MEQ: 1.5 POWDER, FOR SOLUTION ORAL at 15:59

## 2018-02-22 RX ADMIN — ACETAMINOPHEN 650 MG: 325 TABLET, FILM COATED ORAL at 20:24

## 2018-02-22 RX ADMIN — POTASSIUM CHLORIDE 40 MEQ: 1.5 POWDER, FOR SOLUTION ORAL at 22:26

## 2018-02-22 RX ADMIN — SODIUM CHLORIDE 1000 ML: 9 INJECTION, SOLUTION INTRAVENOUS at 14:59

## 2018-02-22 RX ADMIN — POTASSIUM CHLORIDE 40 MEQ: 1.5 POWDER, FOR SOLUTION ORAL at 19:21

## 2018-02-22 ASSESSMENT — ACTIVITIES OF DAILY LIVING (ADL)
RETIRED_COMMUNICATION: 0-->UNDERSTANDS/COMMUNICATES WITHOUT DIFFICULTY
COGNITION: 0 - NO COGNITION ISSUES REPORTED
TOILETING: 0-->INDEPENDENT
TRANSFERRING: 0-->INDEPENDENT
SWALLOWING: 0-->SWALLOWS FOODS/LIQUIDS WITHOUT DIFFICULTY
DRESS: 0-->INDEPENDENT
AMBULATION: 0-->INDEPENDENT
BATHING: 0-->INDEPENDENT
FALL_HISTORY_WITHIN_LAST_SIX_MONTHS: NO
RETIRED_EATING: 0-->INDEPENDENT

## 2018-02-22 ASSESSMENT — ENCOUNTER SYMPTOMS
SORE THROAT: 1
VOMITING: 0
HEADACHES: 1
BACK PAIN: 0
DIAPHORESIS: 0
DIZZINESS: 1
FATIGUE: 1
NAUSEA: 1
SHORTNESS OF BREATH: 1
COUGH: 0
FREQUENCY: 1
LIGHT-HEADEDNESS: 1
CHILLS: 1
DYSURIA: 1
FEVER: 0
MYALGIAS: 1
CHEST TIGHTNESS: 1

## 2018-02-22 NOTE — PHARMACY-ADMISSION MEDICATION HISTORY
Admission medication history interview status for the 2/22/2018  admission is complete. See EPIC admission navigator for prior to admission medications     Medication history source reliability:Good    Actions taken by pharmacist (provider contacted, etc): interview with patient.       Additional medication history information not noted on PTA med list : pt recently finished tamiflu.    Medication reconciliation/reorder completed by provider prior to medication history? No    Time spent in this activity: 15 min    Prior to Admission medications    Medication Sig Last Dose Taking? Auth Provider   prochlorperazine (COMPAZINE) 10 MG tablet Take 1 tablet (10 mg) by mouth every 8 hours as needed for nausea or vomiting prn Yes Adrien Luna MD   ciprofloxacin (CIPRO) 500 MG tablet Take 1 tablet (500 mg) by mouth 2 times daily 2/21/2018 at pm Yes Adrien Luna MD   calcium carbonate-vitamin D (CALCIUM + D) 600-200 MG-UNIT TABS Take 1 tablet by mouth daily 2/21/2018 at am Yes Kenna Davis PA-C   atenolol-chlorthalidone (TENORETIC 100) 100-25 MG per tablet Take 1 tablet by mouth daily 2/22/2018 at am Yes Rosey Limon APRN CNP   carBAMazepine (TEGRETOL) 200 MG tablet Take 1 tablet (200 mg) by mouth 2 times daily 2/22/2018 at am Yes Rosey Limon APRN CNP   topiramate (TOPAMAX) 25 MG tablet Take 1 tablet (25 mg) by mouth 2 times daily 2/22/2018 at am Yes Rosey Limon APRN CNP   simvastatin (ZOCOR) 40 MG tablet Take 1 tablet (40 mg) by mouth At Bedtime 2/21/2018 at hs Yes Rosey Limon APRN CNP   senna-docusate (SENOKOT-S;PERICOLACE) 8.6-50 MG per tablet Take 1 tablet by mouth daily 2/21/2018 at am Yes Rosey Limon APRN CNP   cetirizine (KLS ALLER-JUWAN) 10 MG tablet Take 10 mg by mouth daily. 2/22/2018 at am Yes Reported, Patient   Cranberry 500 MG CAPS Take  by mouth. Daily   2/21/2018 at am Yes Reported, Patient   FISH OIL 1000 MG OR CAPS Twice daily 2/21/2018 at pm Yes Reported, Patient

## 2018-02-22 NOTE — IP AVS SNAPSHOT
MRN:[id number]                      After Visit Summary   2/22/2018    Love Pinon    MRN: [id number]           Thank you!     Thank you for choosing [facility] for your care. Our goal is always to provide you with excellent care. Hearing back from our patients is one way we can continue to improve our services. Please take a few minutes to complete the written survey that you may receive in the mail after you visit with us. Thank you!        Patient Information     Date Of Birth          1955        Designated Caregiver       Most Recent Value    Caregiver    Will someone help with your care after discharge? no      About your hospital stay     You were admitted on:  February 22, 2018 You last received care in the:  [facility] [address] [facility]    You were discharged on:  February 25, 2018        Reason for your hospital stay       Low salt (sodium, hyponatremia) secondary to medications and recent urinary infection                  Who to Call     For medical emergencies, please call 911.  For non-urgent questions about your medical care, please call your primary care provider or clinic, None          Attending Provider     Provider Specialty    Shirley Francisco MD Emergency Medicine    Emeka, Bala Lopes,  Internal Medicine       Primary Care Provider    Kenna Davis PA-C      After Care Instructions     Activity       Your activity upon discharge: activity as tolerated            Diet       Follow this diet upon discharge: Orders Placed This Encounter      Regular Diet Adult                  Follow-up Appointments     Follow-up and recommended labs and tests        Follow up with primary care provider, Kenna Davis, within 7 days to evaluate medication change and for hospital follow- up.  The following labs/tests are recommended: bmp    Schedule followup with nephrology as written in 2-4 weeks                  Your next 10 appointments  "already scheduled     Feb 26, 2018  3:40 PM CST   Office Visit with Bri House MD   [facility] ([facility])    [address] [place] [id number] [phone]           Bring a current list of meds and any records pertaining to this visit. For Physicals, please bring immunization records and any forms needing to be filled out. Please arrive 10 minutes early to complete paperwork.              Pending Results     No orders found from 2/20/2018 to 2/23/2018.            Statement of Approval     Ordered          02/25/18 1229  I have reviewed and agree with all the recommendations and orders detailed in this document.  EFFECTIVE NOW     Approved and electronically signed by:  Shivam Osuna DO             Admission Information     Date & Time Provider Department Dept. Phone    2/22/2018 Bala Hendrickson DO [facility] [place] [address] [facility] [phone]      Your Vitals Were     Blood Pressure Pulse Temperature Respirations Height Weight    145/107 (BP Location: Right arm) 75 98  F (36.7  C) (Oral) 16 1.676 m (5' 6\") 67.3 kg (148 lb 5.9 oz)    Pulse Oximetry BMI (Body Mass Index)                97% 23.95 kg/m2          pocketfungameshart Information     [facility] gives you secure access to your electronic health record. If you see a primary care provider, you can also send messages to your care team and make appointments. If you have questions, please call your primary care clinic.  If you do not have a primary care provider, please call [phone] and they will assist you.        Care EveryWhere ID     This is your Care EveryWhere ID. This could be used by other organizations to access your [facility] medical records  [id number]        Equal Access to Services     RINKU LOMAX : Regis Cartagena, wavalente castillo, qaybta kaalmaemma vega. So [facility] [phone].    ATENCIÓN: Si " logan montilla, tiene a amanda disposición servicios gratuitos de asistencia lingüística. Morgan ashley [phone].    We comply with applicable federal civil rights laws and [place] laws. We do not discriminate on the basis of race, color, national origin, age, disability, sex, sexual orientation, or gender identity.               Review of your medicines      START taking        Dose / Directions    amLODIPine 10 MG tablet   Commonly known as:  NORVASC   Used for:  Hypertension goal BP (blood pressure) < 140/90        Dose:  10 mg   Take 1 tablet (10 mg) by mouth daily   Quantity:  30 tablet   Refills:  0       metoprolol succinate 50 MG 24 hr tablet   Commonly known as:  TOPROL-XL   Used for:  Hypertension goal BP (blood pressure) < 140/90        Dose:  50 mg   Take 1 tablet (50 mg) by mouth daily   Quantity:  30 tablet   Refills:  0         CONTINUE these medicines which have NOT CHANGED        Dose / Directions    calcium + D 600-200 MG-UNIT Tabs   Generic drug:  calcium carbonate-vitamin D        Dose:  1 tablet   Take 1 tablet by mouth daily   Quantity:  60 tablet   Refills:  0       Cranberry 500 MG Caps        Take  by mouth. Daily   Refills:  0       fish oil-omega-3 fatty acids 1000 MG capsule        Twice daily   Refills:  0       KLS ALLER-JUWAN 10 MG tablet   Generic drug:  cetirizine        Dose:  10 mg   Take 10 mg by mouth daily.   Refills:  0       prochlorperazine 10 MG tablet   Commonly known as:  COMPAZINE   Used for:  Nausea        Dose:  10 mg   Take 1 tablet (10 mg) by mouth every 8 hours as needed for nausea or vomiting   Quantity:  20 tablet   Refills:  1       senna-docusate 8.6-50 MG per tablet   Commonly known as:  SENOKOT-S;PERICOLACE        Dose:  1 tablet   Take 1 tablet by mouth daily   Quantity:  28 tablet   Refills:  0       simvastatin 40 MG tablet   Commonly known as:  ZOCOR   Used for:  Hyperlipidemia LDL goal <130        Dose:  40 mg   Take 1 tablet (40 mg) by mouth At Bedtime    Quantity:  90 tablet   Refills:  3       topiramate 25 MG tablet   Commonly known as:  TOPAMAX   Used for:  Intractable migraine without aura and without status migrainosus        Dose:  25 mg   Take 1 tablet (25 mg) by mouth 2 times daily   Quantity:  180 tablet   Refills:  3         STOP taking     atenolol-chlorthalidone 100-25 MG per tablet   Commonly known as:  TENORETIC 100           carBAMazepine 200 MG tablet   Commonly known as:  TEGRETOL           ciprofloxacin 500 MG tablet   Commonly known as:  CIPRO                Where to get your medicines      These medications were sent to [facility] [place], [place] - [address]  [address], [place] [address]     Phone:  [phone]     amLODIPine 10 MG tablet    metoprolol succinate 50 MG 24 hr tablet                Protect others around you: Learn how to safely use, store and throw away your medicines at www.disposemymeds.org.             Medication List: This is a list of all your medications and when to take them. Check marks below indicate your daily home schedule. Keep this list as a reference.      Medications           Morning Afternoon Evening Bedtime As Needed    amLODIPine 10 MG tablet   Commonly known as:  NORVASC   Take 1 tablet (10 mg) by mouth daily                                calcium + D 600-200 MG-UNIT Tabs   Take 1 tablet by mouth daily   Generic drug:  calcium carbonate-vitamin D                                Cranberry 500 MG Caps   Take  by mouth. Daily                                fish oil-omega-3 fatty acids 1000 MG capsule   Twice daily                                KLS ALLER-JUWAN 10 MG tablet   Take 10 mg by mouth daily.   Generic drug:  cetirizine                                metoprolol succinate 50 MG 24 hr tablet   Commonly known as:  TOPROL-XL   Take 1 tablet (50 mg) by mouth daily   Last time this was given:  50 mg on 2/25/2018  7:59 AM                                prochlorperazine 10 MG  tablet   Commonly known as:  COMPAZINE   Take 1 tablet (10 mg) by mouth every 8 hours as needed for nausea or vomiting                                senna-docusate 8.6-50 MG per tablet   Commonly known as:  SENOKOT-S;PERICOLACE   Take 1 tablet by mouth daily                                simvastatin 40 MG tablet   Commonly known as:  ZOCOR   Take 1 tablet (40 mg) by mouth At Bedtime   Last time this was given:  40 mg on 2/24/2018  9:15 PM                                topiramate 25 MG tablet   Commonly known as:  TOPAMAX   Take 1 tablet (25 mg) by mouth 2 times daily   Last time this was given:  25 mg on 2/25/2018  7:59 AM

## 2018-02-22 NOTE — IP AVS SNAPSHOT
[facility] [address] [facility]    [address] [place] [address]    Phone:  [phone]                                       After Visit Summary   2/22/2018    Love Pinon    MRN: [id number]           After Visit Summary Signature Page     I have received my discharge instructions, and my questions have been answered. I have discussed any challenges I see with this plan with the nurse or doctor.    ..........................................................................................................................................  Patient/Patient Representative Signature      ..........................................................................................................................................  Patient Representative Print Name and Relationship to Patient    ..................................................               ................................................  Date                                            Time    ..........................................................................................................................................  Reviewed by Signature/Title    ...................................................              ..............................................  Date                                                            Time

## 2018-02-22 NOTE — H&P
[facility]    History and Physical  Hospitalist       Date of Admission:  2/22/2018  Date of Service (when I saw the patient): 02/22/18    Assessment & Plan   Love Pinon is a 62 year old female who presents with generalized weakness. Admitted for further evaluation and treatment.     Hyponatremia, hypokalemia: Patient with a sodium level of 118, potassium level is 3.0 on admission.  Urinalysis was completed showing small bilirubin, 40 ketones, pH 7.5, protein 100, small blood, mucus present.  Previous urine culture shows coag negative Staphylococcus with sensitivities.  Urine culture was completed on February 16.  Per report, the patient had been on ciprofloxacin.  A chest x-ray was completed on admission showing clear lungs, per report.  - Close sodium monitoring.   - IVF.   - Renal consulted.   - Monitor and replete.   - Cautious normalization of sodium levels.     H/o UTI: Urine culture was completed on February 16.  Per report, the patient had been on ciprofloxacin.   - Repeat UC.   - Hold PTA Cipro.     H/o Influenza A: Patient does have a positive influenza a test from 16 February.    - Monitor.   - Droplet precautions.     Cardiac, HTN, HLD: Stable.   - PRN IV Labetalol and hydralazine.   - Hold PTA Atenolol-chlorthalidone  - PTA Simvastatin    H/o Seizure: Remote history of seizure in 1989.   - PTA Tegretol   - PTA Topamax    # Pain Assessment:   Current Pain Score 9/15/2016   Patient currently in pain? denies   - Love is denies pain. Pain management was discussed and the plan was created in a collaborative fashion.      DVT Prophylaxis: Pneumatic Compression Devices    Code: Full Code    Disposition: Inpatient.    Dr. Bala Hendrickson D.O.  [facility] Hospitalist  Pager [phone]    Primary Care Physician   Kenna Davis    Chief Complaint   Weakness    History is obtained from the patient and medical records.     History of Present Illness   Love GREY  Kathrin is a 62 year old female who presents with generalized weakness. Admitted for further evaluation and treatment. Patient with a sodium level of 118, potassium level is 3.0 on admission.  Urinalysis was completed showing small bilirubin, 40 ketones, pH 7.5, protein 100, small blood, mucus present.  Previous urine culture shows coag negative Staphylococcus with sensitivities.  Urine culture was completed on February 16.  Per report, the patient had been on ciprofloxacin.  Patient does have a positive influenza a test from 16 February.  A chest x-ray was completed on admission showing clear lungs, per report.  Patient denies chest pain, shortness of breath, abdominal pain, nausea, vomiting, back pain, diaphoresis, dysuria, blood in the stool or urine.  Patient does endorse chills, weakness, lightheadedness, near syncope, nausea, loss of appetite, and lightheadedness.    Past Medical History    I have reviewed this patient's medical history and updated it with pertinent information if needed.   Past Medical History:   Diagnosis Date     BPPV (benign paroxysmal positional vertigo) 10/26/2012     Herpes genitalia      Hyperlipidemia      Hypertension      Iron deficiency anemia secondary to blood loss (chronic)     menorrhagia     Migraine headache without aura      Seizure disorder (H)     one seizure-grandmal in 1989       Past Surgical History   I have reviewed this patient's surgical history and updated it with pertinent information if needed.  Past Surgical History:   Procedure Laterality Date     APPENDECTOMY  2007     CL AFF SURGICAL PATHOLOGY  1970    with cryotherapy     COLONOSCOPY  2005    polyp excision, repeat  5 years      COLONOSCOPY N/A 9/15/2016    3 adenoma polyps, repeat in 3 years      D & C       HYSTEROSCOPY       TUBAL LIGATION         Prior to Admission Medications   Prior to Admission Medications   Prescriptions Last Dose Informant Patient Reported? Taking?   Cranberry 500 MG CAPS   Yes No    Sig: Take  by mouth. Daily     FISH OIL 1000 MG OR CAPS   Yes No   Sig: Twice daily   IBANdronate (BONIVA) 150 MG tablet   No No   Sig: Take 1 tablet (150 mg) by mouth every 30 days Take 60 minutes before am meal with 8 oz. water. Remain upright for 30 minutes.   Patient not taking: Reported on 2/16/2018   atenolol-chlorthalidone (TENORETIC 100) 100-25 MG per tablet   No No   Sig: Take 1 tablet by mouth daily   calcium carbonate-vitamin D (CALCIUM + D) 600-200 MG-UNIT TABS   Yes No   Sig: Take 1 tablet by mouth daily   carBAMazepine (TEGRETOL) 200 MG tablet   No No   Sig: Take 1 tablet (200 mg) by mouth 2 times daily   cetirizine (KLS ALLER-JUWAN) 10 MG tablet   Yes No   Sig: Take 10 mg by mouth daily.   ciprofloxacin (CIPRO) 500 MG tablet   No No   Sig: Take 1 tablet (500 mg) by mouth 2 times daily   oseltamivir (TAMIFLU) 75 MG capsule   No No   Sig: Take 1 capsule (75 mg) by mouth 2 times daily   prochlorperazine (COMPAZINE) 10 MG tablet   No No   Sig: Take 1 tablet (10 mg) by mouth every 8 hours as needed for nausea or vomiting   senna-docusate (SENOKOT-S;PERICOLACE) 8.6-50 MG per tablet   Yes No   Sig: Take 1 tablet by mouth daily   simvastatin (ZOCOR) 40 MG tablet   No No   Sig: Take 1 tablet (40 mg) by mouth At Bedtime   topiramate (TOPAMAX) 25 MG tablet   No No   Sig: Take 1 tablet (25 mg) by mouth 2 times daily      Facility-Administered Medications: None     Allergies   Allergies   Allergen Reactions     Aramine [Metaraminol Bitartrate]      Penicillins      Hives, swelling       Social History   I have reviewed this patient's social history and updated it with pertinent information if needed. Love Pinon  reports that she quit smoking about 28 years ago. Her smoking use included Cigarettes. She quit after 18.00 years of use. She has never used smokeless tobacco. She reports that she drinks about 1.8 oz of alcohol per week  She reports that she does not use illicit drugs.    Family History   I have  reviewed this patient's family history and updated it with pertinent information if needed.   Family History   Problem Relation Age of Onset     Cardiovascular Paternal Grandfather       of heart attack     Hypertension Father      Lipids Father      CANCER Father      MELANOMA     Genitourinary Problems Mother      CANCER Paternal Grandmother      Asthma Sister      Asthma Sister        Review of Systems   The 10 point Review of Systems is negative other than noted in the HPI or here.     Physical Exam   Temp: 97.8  F (36.6  C) Temp src: Oral BP: 142/87   Heart Rate: 61 Resp: 11 SpO2: 100 % O2 Device: None (Room air)    Vital Signs with Ranges  Temp:  [97.8  F (36.6  C)] 97.8  F (36.6  C)  Heart Rate:  [58-64] 61  Resp:  [11-18] 11  BP: (111-157)/(81-91) 142/87  SpO2:  [99 %-100 %] 100 %  150 lbs 0 oz    GENERAL: Alert and oriented. NAD. Conversational, appropriate. Appears weak.   HEENT: Normocephalic. EOMI. No icterus or injection. Nares normal.   LUNGS: Clear to auscultation. No dyspnea at rest.   HEART: Regular rate. Extremities perfused.   ABDOMEN: Soft, nontender, and nondistended. Positive bowel sounds.   EXTREMITIES: No LE edema noted.   NEUROLOGIC: Moves extremities x4. No acute focal neurologic abnormalities noted.     Data   Data reviewed today:  I personally reviewed both laboratory and imaging data.     Recent Labs  Lab 18  1456   *   POTASSIUM 2.1*   CHLORIDE 74*   CO2 25   BUN 10   CR 0.63   ANIONGAP 14   BEBO 8.8   *       Recent Results (from the past 24 hour(s))   XR Chest 2 Views    Narrative    XR CHEST 2 VW 2018 2:33 PM    HISTORY: Cough.    COMPARISON: None.      Impression    IMPRESSION: The lungs are clear. No focal pulmonary opacities. Heart  and mediastinum are unremarkable. No acute cardiopulmonary  abnormalities.    JAZZMINE SALEH MD

## 2018-02-22 NOTE — ED NOTES
[facility]  ED Nurse Handoff Report    ED Chief complaint: Generalized Weakness (recently dx with influenza A, finished Tamiflu, pt to ED for constant nausea, body aches, weakness)      ED Diagnosis:   Final diagnoses:   Hyponatremia       Code Status: Full Code    Allergies:   Allergies   Allergen Reactions     Aramine [Metaraminol Bitartrate]      Penicillins      Hives, swelling       Activity level - Baseline/Home:  Independent    Activity Level - Current:   Independent     Needed?: No    Isolation: Yes  Infection: Not Applicable  Influenza    Bariatric?: No    Vital Signs:   Vitals:    02/22/18 1530 02/22/18 1600 02/22/18 1615 02/22/18 1630   BP: (!) 148/91 (!) 157/91 153/90 142/87   Resp:  18 11    Temp:       TempSrc:       SpO2: 100% 100% 100% 100%   Weight:       Height:           Cardiac Rhythm: ,        Pain level:      Is this patient confused?: No    Patient Report: Initial Complaint: pt recently dx with influenza treated with tamiflu, not feeling any better has increase weakness.  Also has UTI treated with antibiotics and still not feeling well  Focused Assessment: see above  Tests Performed: see epic  Abnormal Results: K+ 2.1 redrawn at 1700 BMP pending yet,   Treatments provided: 1 L NS and potassium 60mEq po    Family Comments: pt here alone    OBS brochure/video discussed/provided to patient: N/A    ED Medications:   Medications   0.9% sodium chloride BOLUS (0 mLs Intravenous Stopped 2/22/18 1706)     Followed by   sodium chloride 0.9% infusion (not administered)   potassium chloride (KLOR-CON) Packet 60 mEq (60 mEq Oral Given 2/22/18 5375)       Drips infusing?:  No      ED NURSE PHONE NUMBER: *[phone]

## 2018-02-22 NOTE — ED PROVIDER NOTES
History     Chief Complaint:  Generalized Weakness    HPI   Love Pinon is a 62 year old female who presents to the emergency department today for evaluation of generalized weakness. The patient was recently diagnosed with influenza A on 2/16 and finished her course of Tamiflu yesterday. The patient was also diagnosed with a UTI on 2/19 and prescribed ciprofloxacin and compazine for her nausea. The patient states that she does not feel improved since taking and finishing her Tamiflu. She states her symptoms initially began with a sore throat, cough, and nausea, as well as dysuria. The patient notes that her urinary frequency and dysuria have also persisted, even since starting the antibiotics and taking Tylenol as directed. Today the patient states the constant nausea, myalgias, dizziness, and loss of appetite caused her and her family concerned so she presented in the emergency department for evaluation. She states the dizziness is also accompanied by some lightheadedness, and feeling near-syncopal when she stands up. Patient has not measured any fevers at home, but she does endorse some chills.  She also endorses a 7/10 intermittent headache, as well as some chest tightness and shortness of breath with her cough. her cough has seemed to improve since onset however. The patient denies any abdominal pain, vomiting, diaphoresis, back pain.     Allergies:  Aramine [Metaraminol Bitartrate]  Penicillins      Medications:    prochlorperazine (COMPAZINE) 10 MG tablet  ciprofloxacin (CIPRO) 500 MG tablet  oseltamivir (TAMIFLU) 75 MG capsule  calcium carbonate-vitamin D (CALCIUM + D) 600-200 MG-UNIT TABS  atenolol-chlorthalidone (TENORETIC 100) 100-25 MG per tablet  carBAMazepine (TEGRETOL) 200 MG tablet  topiramate (TOPAMAX) 25 MG tablet  simvastatin (ZOCOR) 40 MG tablet  IBANdronate (BONIVA) 150 MG tablet  senna-docusate (SENOKOT-S;PERICOLACE) 8.6-50 MG per tablet    Past Medical History:    Benign paroxysmal  "positional vertigo  Herpes genitalia  Hyperlipidemia   Hypertension  Iron deficiency anemia   Migraine headache with aura   Seizure disorder     Past Surgical History:    Appendectomy   Colonoscopy x2  D&C   Hysterectomy   Tubal ligation     Family History:    Hypertension  Hyperlipidemia   Melanoma     Social History:  The patient was accompanied to the ED by family.  Smoking Status: Former smoker  Smokeless Tobacco: No  Alcohol Use: Yes    Marital Status:       Review of Systems   Constitutional: Positive for chills and fatigue. Negative for diaphoresis and fever.   HENT: Positive for sore throat.    Respiratory: Positive for chest tightness and shortness of breath. Negative for cough.    Gastrointestinal: Positive for nausea. Negative for vomiting.   Genitourinary: Positive for dysuria and frequency.   Musculoskeletal: Positive for myalgias. Negative for back pain.   Neurological: Positive for dizziness, light-headedness and headaches.   All other systems reviewed and are negative.    Physical Exam     Patient Vitals for the past 24 hrs:   BP Temp Temp src Heart Rate Resp SpO2 Height Weight   02/22/18 1500 126/83 - - 58 12 99 % - -   02/22/18 1445 111/83 - - - - - - -   02/22/18 1343 119/81 97.8  F (36.6  C) Oral 64 18 100 % 1.676 m (5' 6\") 68 kg (150 lb)      Physical Exam  Gen: Pleasant, appears stated age.    Eye:   Pupils are equal, round, and reactive.     Sclera non-injected.    ENT:   Dry mucus membranes.     Normal tongue.    Oropharynx without lesions.    Cardiac:     Bradycardic but regular    No murmurs, gallops, or rubs.    Pulmonary:     Symmetrical crackles bilateral lung bases    No wheezes, rales, or rhonchi.    Abdomen:     Normal active bowel sounds.     Abdomen is soft and non-distended, without focal tenderness.    Musculoskeletal:     Normal movement of all extremities without evidence for deficit.    Extremities:    No edema.    Skin:   Warm and dry. Skin tenting.     Neurologic: "    Non-focal exam without asymmetric weakness or numbness.    Normal tone   GCS 15    Psychiatric:     Normal affect with appropriate interaction with examiner.     Emergency Department Course     ECG:  Indication: Weakness   Completed at 1435.  Read at 1440.   Sinus bradycardia with 1st degree AV block. Incomplete right bundle branch block. Nonspecific T wave abnormality. Prolonged QT. Abnormal ECG.   Rate 59 bpm. MT interval 228. QRS duration 108. QT/QTc 504/498. P-R-T axes 66 60 79.     Imaging:  Radiology findings were communicated with the patient and family who voiced understanding of the findings.    XR Chest 2 Views:  IMPRESSION: The lungs are clear. No focal pulmonary opacities. Heart  and mediastinum are unremarkable. No acute cardiopulmonary  abnormalities.  Report per radiology     Laboratory:  Laboratory findings were communicated with the patient and family who voiced understanding of the findings.    CBC: WBC Pending   1. BMP: Na 113 (LL), Potassium 2.1 (LL), Chloride 74 (L), Glucose 132 (H), o/w WNL (Creatinine 0.63)   Magnesium: 1.8   2. BMP: Pending.    UA: Yellow and clear urine. Urine bilirubin small, Urine ketones 40, Urine blood small, Urine pH 7.5 (H), Protein albumin urine 100, Mucous urine present, o/w WNL      Interventions:  1459 NS Bolus 1,000mL IV   1559 Potassium chloride 60mEq PO     Emergency Department Course:  Nursing notes and vitals reviewed.  1415 I performed an exam of the patient as documented above.   EKG obtained in the ED, see results above.    The patient was sent for a chest x-ray while in the emergency department, results above.    IV was inserted and blood was drawn for laboratory testing, results above.   The patient provided a urine sample here in the emergency department. This was sent for laboratory testing, findings above.   1544 I rechecked and updated the patient on her blood work results. She appears slightly improved.  1650 I spoke with Dr. Héctor Hendrickson of the  Hospitalist service regarding patient's presentation, findings, and plan of care.   I discussed the treatment plan with the patient. They expressed understanding of this plan and consented to admission. I discussed the patient with Dr. Hendrickson, who will admit the patient to a monitored bed for further evaluation and treatment. I personally reviewed the laboratory and imaging results with the Patient and family and answered all related questions prior to admission.   Impression & Plan      Medical Decision Making:  Love Pinon is a 62 year old female with history of hypertension, seizure disorder and recent influenza who presents today with generalized weakness and near syncope. On exam, the patient is alert with normal cognition. Vitals were notable for slightly elevated blood pressure but otherwise she is afebrile.  Chest radiograph is negative for pneumonia. Labs are concerning for very low sodium level. This is likely related to volume depletion but topiramate could be contributing as well.  She is symptomatic with headache, nausea, and weakness.  She was also noted to be very hypokalemic. This resulted in a slightly prolonged QT interval. She was started on fluids with normal saline upon arrival to the emergency department. Upon return of sodium level of 113, normal saline was discontinued.  Mental status has remained stable. Repeat BMP demonstrates improvement in potassium, though sodium was corrected faster than ideal. She will be admitted for continued cardiac monitoring, electrolyte repletion, and reassessment.     Diagnosis:    ICD-10-CM    1. Hyponatremia E87.1 CBC with platelets differential     Basic metabolic panel     Basic metabolic panel     Basic metabolic panel     Urine Culture Aerobic Bacterial     Sodium     Potassium     Potassium     CANCELED: Sodium     CANCELED: Potassium     CANCELED: Sodium   2. Hypokalemia E87.6        Disposition:  Admitted under the supervision of Dr. Hendrickson.       Scribe Disclosure:  I, Westley Story, am serving as a scribe at 2:00 PM on 2/22/2018 to document services personally performed by Shirley Francisco MD based on my observations and the provider's statements to me.    2/22/2018    EMERGENCY DEPARTMENT      Shirley Francisco MD  02/23/18 0035

## 2018-02-23 LAB
ALBUMIN SERPL-MCNC: 3.2 G/DL (ref 3.4–5)
ALBUMIN UR-MCNC: NEGATIVE MG/DL
ALP SERPL-CCNC: 80 U/L (ref 40–150)
ALT SERPL W P-5'-P-CCNC: 39 U/L (ref 0–50)
ANION GAP SERPL CALCULATED.3IONS-SCNC: 12 MMOL/L (ref 3–14)
ANION GAP SERPL CALCULATED.3IONS-SCNC: 8 MMOL/L (ref 3–14)
APPEARANCE UR: CLEAR
AST SERPL W P-5'-P-CCNC: 27 U/L (ref 0–45)
BACTERIA SPEC CULT: NO GROWTH
BILIRUB SERPL-MCNC: 0.5 MG/DL (ref 0.2–1.3)
BILIRUB UR QL STRIP: NEGATIVE
BUN SERPL-MCNC: 5 MG/DL (ref 7–30)
BUN SERPL-MCNC: 6 MG/DL (ref 7–30)
CALCIUM SERPL-MCNC: 8.3 MG/DL (ref 8.5–10.1)
CALCIUM SERPL-MCNC: 8.3 MG/DL (ref 8.5–10.1)
CHLORIDE SERPL-SCNC: 91 MMOL/L (ref 94–109)
CHLORIDE SERPL-SCNC: 91 MMOL/L (ref 94–109)
CO2 SERPL-SCNC: 21 MMOL/L (ref 20–32)
CO2 SERPL-SCNC: 24 MMOL/L (ref 20–32)
COLOR UR AUTO: ABNORMAL
CREAT SERPL-MCNC: 0.5 MG/DL (ref 0.52–1.04)
CREAT SERPL-MCNC: 0.52 MG/DL (ref 0.52–1.04)
CREAT UR-MCNC: 32 MG/DL
ERYTHROCYTE [DISTWIDTH] IN BLOOD BY AUTOMATED COUNT: 11.4 % (ref 10–15)
FRACT EXCRET NA UR+SERPL-RTO: 0.8 %
GFR SERPL CREATININE-BSD FRML MDRD: >90 ML/MIN/1.7M2
GFR SERPL CREATININE-BSD FRML MDRD: >90 ML/MIN/1.7M2
GLUCOSE SERPL-MCNC: 100 MG/DL (ref 70–99)
GLUCOSE SERPL-MCNC: 102 MG/DL (ref 70–99)
GLUCOSE UR STRIP-MCNC: NEGATIVE MG/DL
HCT VFR BLD AUTO: 34.1 % (ref 35–47)
HGB BLD-MCNC: 13.1 G/DL (ref 11.7–15.7)
HGB UR QL STRIP: NEGATIVE
KETONES UR STRIP-MCNC: 10 MG/DL
LEUKOCYTE ESTERASE UR QL STRIP: NEGATIVE
Lab: NORMAL
MCH RBC QN AUTO: 34 PG (ref 26.5–33)
MCHC RBC AUTO-ENTMCNC: 38.4 G/DL (ref 31.5–36.5)
MCV RBC AUTO: 89 FL (ref 78–100)
MUCOUS THREADS #/AREA URNS LPF: PRESENT /LPF
NITRATE UR QL: NEGATIVE
OSMOLALITY UR: 253 MMOL/KG (ref 100–1200)
PH UR STRIP: 7 PH (ref 5–7)
PLATELET # BLD AUTO: 267 10E9/L (ref 150–450)
POTASSIUM SERPL-SCNC: 3.3 MMOL/L (ref 3.4–5.3)
POTASSIUM SERPL-SCNC: 3.3 MMOL/L (ref 3.4–5.3)
POTASSIUM SERPL-SCNC: 4.3 MMOL/L (ref 3.4–5.3)
PROT SERPL-MCNC: 6.3 G/DL (ref 6.8–8.8)
RBC # BLD AUTO: 3.85 10E12/L (ref 3.8–5.2)
RBC #/AREA URNS AUTO: 1 /HPF (ref 0–2)
SODIUM SERPL-SCNC: 123 MMOL/L (ref 133–144)
SODIUM SERPL-SCNC: 123 MMOL/L (ref 133–144)
SODIUM SERPL-SCNC: 124 MMOL/L (ref 133–144)
SODIUM UR-SCNC: 61 MMOL/L
SOURCE: ABNORMAL
SP GR UR STRIP: 1 (ref 1–1.03)
SPECIMEN SOURCE: NORMAL
UROBILINOGEN UR STRIP-MCNC: NORMAL MG/DL (ref 0–2)
WBC # BLD AUTO: 3.6 10E9/L (ref 4–11)
WBC #/AREA URNS AUTO: 1 /HPF (ref 0–2)

## 2018-02-23 PROCEDURE — 25000132 ZZH RX MED GY IP 250 OP 250 PS 637: Performed by: INTERNAL MEDICINE

## 2018-02-23 PROCEDURE — 81001 URINALYSIS AUTO W/SCOPE: CPT | Performed by: INTERNAL MEDICINE

## 2018-02-23 PROCEDURE — 84300 ASSAY OF URINE SODIUM: CPT | Performed by: INTERNAL MEDICINE

## 2018-02-23 PROCEDURE — 82570 ASSAY OF URINE CREATININE: CPT | Performed by: INTERNAL MEDICINE

## 2018-02-23 PROCEDURE — 99232 SBSQ HOSP IP/OBS MODERATE 35: CPT | Performed by: HOSPITALIST

## 2018-02-23 PROCEDURE — 12000000 ZZH R&B MED SURG/OB

## 2018-02-23 PROCEDURE — 84295 ASSAY OF SERUM SODIUM: CPT | Performed by: HOSPITALIST

## 2018-02-23 PROCEDURE — 25000128 H RX IP 250 OP 636: Performed by: HOSPITALIST

## 2018-02-23 PROCEDURE — 36415 COLL VENOUS BLD VENIPUNCTURE: CPT | Performed by: HOSPITALIST

## 2018-02-23 PROCEDURE — 25000128 H RX IP 250 OP 636: Performed by: INTERNAL MEDICINE

## 2018-02-23 PROCEDURE — 83935 ASSAY OF URINE OSMOLALITY: CPT | Performed by: INTERNAL MEDICINE

## 2018-02-23 PROCEDURE — 36415 COLL VENOUS BLD VENIPUNCTURE: CPT | Performed by: INTERNAL MEDICINE

## 2018-02-23 PROCEDURE — 25000132 ZZH RX MED GY IP 250 OP 250 PS 637: Performed by: HOSPITALIST

## 2018-02-23 PROCEDURE — 84132 ASSAY OF SERUM POTASSIUM: CPT | Performed by: HOSPITALIST

## 2018-02-23 PROCEDURE — 80053 COMPREHEN METABOLIC PANEL: CPT | Performed by: HOSPITALIST

## 2018-02-23 PROCEDURE — 85027 COMPLETE CBC AUTOMATED: CPT | Performed by: HOSPITALIST

## 2018-02-23 PROCEDURE — 80048 BASIC METABOLIC PNL TOTAL CA: CPT | Performed by: INTERNAL MEDICINE

## 2018-02-23 RX ORDER — CARBAMAZEPINE 200 MG/1
200 TABLET ORAL DAILY
Status: DISCONTINUED | OUTPATIENT
Start: 2018-02-24 | End: 2018-02-24

## 2018-02-23 RX ORDER — SODIUM CHLORIDE 9 MG/ML
INJECTION, SOLUTION INTRAVENOUS CONTINUOUS
Status: DISCONTINUED | OUTPATIENT
Start: 2018-02-23 | End: 2018-02-24

## 2018-02-23 RX ORDER — DEXTROSE MONOHYDRATE 50 MG/ML
INJECTION, SOLUTION INTRAVENOUS CONTINUOUS
Status: DISCONTINUED | OUTPATIENT
Start: 2018-02-23 | End: 2018-02-23

## 2018-02-23 RX ORDER — POTASSIUM CHLORIDE 1.5 G/1.58G
20 POWDER, FOR SOLUTION ORAL 2 TIMES DAILY
Status: DISCONTINUED | OUTPATIENT
Start: 2018-02-23 | End: 2018-02-24

## 2018-02-23 RX ORDER — MAGNESIUM OXIDE 400 MG/1
400 TABLET ORAL DAILY
Status: DISCONTINUED | OUTPATIENT
Start: 2018-02-23 | End: 2018-02-25 | Stop reason: HOSPADM

## 2018-02-23 RX ORDER — METOPROLOL SUCCINATE 25 MG/1
25 TABLET, EXTENDED RELEASE ORAL DAILY
Status: DISCONTINUED | OUTPATIENT
Start: 2018-02-23 | End: 2018-02-24

## 2018-02-23 RX ADMIN — SIMVASTATIN 40 MG: 20 TABLET, FILM COATED ORAL at 20:31

## 2018-02-23 RX ADMIN — SODIUM CHLORIDE: 9 INJECTION, SOLUTION INTRAVENOUS at 11:31

## 2018-02-23 RX ADMIN — POTASSIUM CHLORIDE 40 MEQ: 1500 TABLET, EXTENDED RELEASE ORAL at 09:23

## 2018-02-23 RX ADMIN — POTASSIUM CHLORIDE 20 MEQ: 1500 TABLET, EXTENDED RELEASE ORAL at 18:46

## 2018-02-23 RX ADMIN — POTASSIUM CHLORIDE 20 MEQ: 1.5 POWDER, FOR SOLUTION ORAL at 20:31

## 2018-02-23 RX ADMIN — CARBAMAZEPINE 200 MG: 200 TABLET ORAL at 09:22

## 2018-02-23 RX ADMIN — ACETAMINOPHEN 650 MG: 325 TABLET, FILM COATED ORAL at 05:49

## 2018-02-23 RX ADMIN — METOPROLOL SUCCINATE 25 MG: 25 TABLET, EXTENDED RELEASE ORAL at 15:25

## 2018-02-23 RX ADMIN — DEXTROSE MONOHYDRATE: 50 INJECTION, SOLUTION INTRAVENOUS at 10:46

## 2018-02-23 RX ADMIN — TOPIRAMATE 25 MG: 25 TABLET, FILM COATED ORAL at 20:31

## 2018-02-23 RX ADMIN — TOPIRAMATE 25 MG: 25 TABLET, FILM COATED ORAL at 09:23

## 2018-02-23 RX ADMIN — POTASSIUM CHLORIDE 20 MEQ: 1.5 POWDER, FOR SOLUTION ORAL at 00:38

## 2018-02-23 RX ADMIN — POTASSIUM CHLORIDE 40 MEQ: 1500 TABLET, EXTENDED RELEASE ORAL at 16:44

## 2018-02-23 RX ADMIN — ACETAMINOPHEN 650 MG: 325 TABLET, FILM COATED ORAL at 11:45

## 2018-02-23 RX ADMIN — POTASSIUM CHLORIDE 20 MEQ: 1500 TABLET, EXTENDED RELEASE ORAL at 11:31

## 2018-02-23 RX ADMIN — MAGNESIUM OXIDE TAB 400 MG (241.3 MG ELEMENTAL MG) 400 MG: 400 (241.3 MG) TAB at 11:38

## 2018-02-23 NOTE — PLAN OF CARE
Problem: Patient Care Overview  Goal: Plan of Care/Patient Progress Review  Outcome: Improving  Pt denies any discomfort, other than HA which she rates a 5. Lungs CTA, no cough noted. Tolerated long walk in halls. K+ replacement given today. Tolerating clear liquids.

## 2018-02-23 NOTE — CONSULTS
See dictation. Hyponatremia assoc with tegretol and thiazide.     D/C thiazide. Taper tegretol to off (remote sz hx). Replace Mg and K. IV NS at 50 cc/h and follow Na.

## 2018-02-23 NOTE — PROVIDER NOTIFICATION
MD Notification    Notified Person:  MD    Notified Persons Name: Emeka    Notification Date/Time:2/22/2018 2/22/2018      Notification Interaction:  Web paged with Physician    Purpose of Notification: Na 120, K+ 3.3    Orders Received: Decrease NS to 50ml/hour, replace K+ per protocol.     Comments:pt already received 100 meq kdur in ED, need advise if continue to follow K+ protocol.

## 2018-02-23 NOTE — PLAN OF CARE
Problem: Patient Care Overview  Goal: Plan of Care/Patient Progress Review  Outcome: No Change  A&O x4. SBA. VSS on RA. Seizure precaution in place. Droplet iso maintained.  IVF NS running at 50. Tele SR with 1st degree AVB. Potassium replacement finished this shift, CMP sched this AM. Na trending up from 120 to 123. UC pending. NPO except for meds, pt is tolerating. Nephrology consult ordered.  Cont to monitor.

## 2018-02-23 NOTE — CONSULTS
"CLINICAL NUTRITION SERVICES  -  ASSESSMENT NOTE      Malnutrition: Patient does not meet two of the criteria necessary for diagnosing malnutrition        REASON FOR ASSESSMENT  Love Pinon is a 62 year old female seen by Registered Dietitian for Admission Nutrition Risk Screen - Unintentional weight loss of 10# or more in past 2 months      NUTRITION HISTORY  - Information obtained from from the pt. She states she's been eating <50% for the past 3 weeks due to not feeling well.  She's been eating soups, applesauce, \"things like that\".  She normally follows a regular diet.      CURRENT NUTRITION ORDERS  Diet Order:     Clear Liquid     Current Intake/Tolerance:  She says she's starving and wants regular food.      PHYSICAL FINDINGS  Observed  No nutrition-related physical findings observed  Obtained from Chart/Interdisciplinary Team  None noted    ANTHROPOMETRICS  Height: 5' 6\"  Weight: 147 lbs 14.86 oz (67.1 kg)  Body mass index is 23.88 kg/(m^2).  Weight Status:  Normal BMI  IBW: 59 kg +/- 10%  % IBW: 114%  Weight History: Pt states on her scale she weighed 142# before admit, usual wt is 152#.  She says she has lost 10# in the past 10 months, the first 5# were intentional.  Per records below, she's lost 10# (6%) in the past 4 months.  Wt Readings from Last 10 Encounters:   02/23/18 67.1 kg (147 lb 14.9 oz)   02/16/18 67.6 kg (149 lb)   10/10/17 71.5 kg (157 lb 9.6 oz)   08/14/17 69.9 kg (154 lb)   02/22/17 68 kg (150 lb)   09/15/16 63 kg (139 lb)   06/22/16 64.9 kg (143 lb)   01/29/16 65.3 kg (144 lb)   04/03/15 67.2 kg (148 lb 3.2 oz)   02/06/15 69.9 kg (154 lb)         LABS  Labs reviewed    MEDICATIONS  Medications reviewed    Dosing Weight 67.1 kg - current wt (2/23)    ASSESSED NUTRITION NEEDS PER APPROVED PRACTICE GUIDELINES:  Estimated Energy Needs: 3833-2377 kcals (25-30 Kcal/Kg)  Justification: maintenance  Estimated Protein Needs:  grams protein (1.2-1.5 g pro/Kg)  Justification: preservation " of lean body mass      MALNUTRITION:  % Weight Loss:  Weight loss does not meet criteria for malnutrition   % Intake:  </= 50% for >/= 5 days (severe malnutrition)  Subcutaneous Fat Loss:  None observed  Muscle Loss:  None observed  Fluid Retention:  None noted    Malnutrition Diagnosis: Patient does not meet two of the above criteria necessary for diagnosing malnutrition      NUTRITION DIAGNOSIS:  Inadequate oral intake related to poor appetite as evidenced by report of eating <50% x 3 weeks      NUTRITION INTERVENTIONS  Recommendations / Nutrition Prescription  ADAT      Implementation  Nutrition education: Per Provider order if indicated       Nutrition Goals  Pt will advance to solids in 1-2 days and consume at least 50% of meals.      MONITORING AND EVALUATION:  Progress towards goals will be monitored and evaluated per protocol and Practice Guidelines    Irma Khanna RD  Pager [phone] (M-F)            [phone] ([facility])

## 2018-02-23 NOTE — PLAN OF CARE
RECEIVING UNIT ED HANDOFF REVIEW    ED Nurse Handoff Report was reviewed by: Alberto Vo on February 22, 2018 at 6:58 PM     Report reviewed, room is ready, can send pt from ED. Thanks.

## 2018-02-23 NOTE — PLAN OF CARE
Problem: Patient Care Overview  Goal: Plan of Care/Patient Progress Review  Outcome: No Change  Care Plan Summary Note: Admitted from ED at 1945, vss, alert x4, c/o moderate HA, PRN tylenol given, currently resting. NPO except for meds. Serial Na Q 4 hours, next at 2200. SBA x1, pt c/o slight dizziness. Fall precaution arm band in place. Droplet and seizure precaution in place as well. +BS, lungs clear, on RA. PIV patent and infusing. Nephrology consult ordered. UC pending. Cooperative and pleasant with care. Tele NSR wit 1st degree AVB. Skin intact, except for minor bruising.  Infrequent productive cough, pt swallowed, unable to assess color and consistency. Monitor.     Update: Na 120, K+ 3.3 at 2200. MD notified, decreased NS to 50ml/hour and replaced K+ per protocol.

## 2018-02-23 NOTE — CONSULTS
Consult Date:  02/23/2018      RENAL CONSULTATION      REQUESTING PHYSICIAN:  Shivam Osuna DO       CONSULTANT:  Dm Pollard MD      REASON FOR CONSULTATION:  Hyponatremia.      HISTORY OF PRESENT ILLNESS:  This is a 62-year-old admitted yesterday with weakness.  She was noted to have a sodium of 118 and a potassium of 3.0.  She had recently been on Cipro for urinary tract infection and Tamiflu for influenza A.  She was also on a thiazide diuretic, chlorthalidone.  She was also on Tegretol and Topamax.  She was initially treated with normal saline and then switch to D5 to slow the correction of the sodium.      Currently, she is feeling reasonably well.  She has no headache, nausea or vomiting.  She denies shortness of breath.      PAST MEDICAL HISTORY:  Hypertension, seizures, hyperlipidemia and recurrent urinary tract infections.      PAST SURGICAL HISTORY:  Includes appendectomy, bilateral tubal ligation and D and C.      CURRENT MEDICATIONS:  Include:  Tegretol at a reduced dose of 200 mg per day.  She is also on magnesium, Maxzide, potassium chloride and Topamax.  She is on normal saline.        She is not currently on any blood pressure medications.      SOCIAL HISTORY:  She is a former smoker, she drinks some.  She lives in a town home in [place], [place].  She is working full-time selling granite counter tops.      FAMILY HISTORY:  The grandfather had an MI, the grandmother had cancer.  The father has hypertension and melanoma, a sister had asthma.      REVIEW OF SYSTEMS:  Negative except as noted above.      PHYSICAL EXAMINATION:   GENERAL:  She is alert, she is lying comfortably in bed.  Her urine output so far today is 1675 mL.  Her weight is down about 0.9 kilograms.   VITAL SIGNS:  Blood pressure 143/90, heart rate 66, respiratory rate 18.  She is afebrile.   SKIN:  Negative.   HEENT:  Head shows no trauma.  The eyes show pupils round and reactive to light.  The mouth shows some missing  teeth, but otherwise good dentition.  Posterior pharynx is clear.   NECK:  Supple.   LUNGS:  Show clear breath sounds.   CARDIAC:  Regular rhythm, normal S1, S2, no murmur.   ABDOMEN:  Normal bowel sounds, soft and nontender, no bruit, no hepatosplenomegaly.   EXTREMITIES:  Normal nails, joints and pulses throughout, no clubbing, no cyanosis, no edema.   NEUROLOGIC EXAMINATION:  Symmetric cranial nerves, normal mental status, moving all 4 extremities well.      LABORATORY DATA:  Today, her sodium is 123, potassium 3.3, bicarbonate 24, BUN and creatinine are normal, calcium 8.3.  Her albumin is 3.2, she has normal liver function tests.  Her sodium in the past has been in the 136-140 range.  She has had low sodiums as well.  In 2014, her sodium was 127.  In 2017, sodium was 134 and again on admission she was 113.  She has had normal kidney function tests in the past.      IMPRESSION:   1.  Hyponatremia.  This may be a medication effect from thiazide diuretic and Tegretol.  We will stop the thiazide and taper off the Tegretol.  We will recheck a basic metabolic panel, magnesium and phosphorous in the morning.  We will also check a urine osmolality, plasma osmolality, uric acid, urine sodium and fractional excretion of sodium.  She will need thyroid function tests rechecked as well.   2.  Hypertension.  Her blood pressure is borderline high at this point.  Obviously she should not be on a thiazide diuretic in the future.  She was on a beta blocker prior to admission and we could restart metoprolol.  She should be able to tolerate that well, apparently she had a cough with lisinopril.         RAMESH CRAIG MD             D: 2018   T: 2018   MT: SABINA      Name:     RERE BARON   MRN:      [id number]        Account:       [id number]   :      1955           Consult Date:  2018      Document: [id number]

## 2018-02-23 NOTE — PROGRESS NOTES
[facility]    Hospitalist Progress Note    Assessment & Plan   Love Pinon is a 62 year old female who was admitted on 2/22/2018 with hyponatremia and hypokalemia after treatment for a UTI    Hyponatremia, hypokalemia:   Thought 2/2 tegretol and thiazide, both being held  neph is following  Slow improvement, patient notes her unsteadiness is much improved  - Close sodium monitoring withg q 4  - IVF at 50 cc/hr per nephrology at this point  - Renal consulted.   - Cautious normalization of sodium levels.      H/o UTI: Urine culture was completed on February 16.  Per report, the patient had been on ciprofloxacin, completed about 4-5 days  - Repeat UC with no growth  - holding off on the cipro as asymptomatic     H/o Influenza A: Patient does have a positive influenza a test from 16 February.    - Monitor.   - Droplet precautions.      Cardiac, HTN, HLD: Stable.   - PRN IV Labetalol and hydralazine.   - Hold PTA Atenolol-chlorthalidone  - PTA Simvastatin     H/o Seizure: Remote history of seizure in 1989.   - PTA Tegretol   - PTA Topamax       # Pain Assessment:   Current Pain Score 2/23/2018 2/23/2018 2/23/2018   Patient currently in pain? yes - yes   Pain score (0-10) 5 5 7   Pain location - - Head   Pain descriptors Headache - Headache   Love s pain level was assessed and she currently denies pain.        DVT Prophylaxis: Pneumatic Compression Devices  Code Status: Full Code    Disposition: Expected discharge in 2 days once sodium is well controlled.    Shivam Osuna, DO  Text Page  (7am to 6pm)  Interval History   Feeling back to normal  Ambulating with nursing    -Data reviewed today: I reviewed all new labs and imaging results over the last 24 hours. I personally reviewed no images or EKG's today.    Physical Exam   Temp: 97.7  F (36.5  C) Temp src: Oral BP: 143/90 Pulse: 66 Heart Rate: 58 Resp: 18 SpO2: 98 % O2 Device: None (Room air)    Vitals:    02/22/18 1343 02/22/18 1957  02/23/18 0632   Weight: 68 kg (150 lb) 68 kg (149 lb 14.6 oz) 67.1 kg (147 lb 14.9 oz)     Vital Signs with Ranges  Temp:  [97.4  F (36.3  C)-97.9  F (36.6  C)] 97.7  F (36.5  C)  Pulse:  [64-74] 66  Heart Rate:  [58-61] 58  Resp:  [11-18] 18  BP: (126-153)/(79-94) 143/90  SpO2:  [96 %-100 %] 98 %  I/O last 3 completed shifts:  In: 911 [P.O.:120; I.V.:791]  Out: 1825 [Urine:1825]    Constitutional: Awake, alert, cooperative, no apparent distress  Respiratory: Clear to auscultation bilaterally, no crackles or wheezing  Cardiovascular: Regular rate and rhythm, normal S1 and S2, and no murmur noted  GI: Normal bowel sounds, soft, non-distended, non-tender  Skin/Integumen: No rashes, no cyanosis, no edema  Other:     Medications     sodium chloride 50 mL/hr at 02/23/18 1131       potassium chloride  20 mEq Oral BID     magnesium oxide  400 mg Oral Daily     [START ON 2/24/2018] carBAMazepine  200 mg Oral Daily     metoprolol succinate  25 mg Oral Daily     sodium chloride (PF)  3 mL Intracatheter Q8H     simvastatin  40 mg Oral At Bedtime     topiramate  25 mg Oral BID       Data     Recent Labs  Lab 02/23/18  0630 02/23/18  0155 02/22/18  2140 02/22/18  1742 02/22/18  1700 02/22/18  1456   WBC 3.6*  --   --   --   --  3.8*   HGB 13.1  --   --   --   --  14.9   MCV 89  --   --   --   --  87     --   --   --   --  289   * 123* 120* 118* Canceled, Test credited 113*   POTASSIUM 3.3*  --  3.3* 3.0* Canceled, Test credited 2.1*   CHLORIDE 91*  --   --  81* Canceled, Test credited 74*   CO2 24  --   --  26 Canceled, Test credited 25   BUN 6*  --   --  7 Canceled, Test credited 10   CR 0.52  --   --  0.66 Canceled, Test credited 0.63   ANIONGAP 8  --   --  11 Canceled, Test credited 14   BEBO 8.3*  --   --  8.5 Canceled, Test credited 8.8   *  --   --  105* Canceled, Test credited 132*   ALBUMIN 3.2*  --   --   --   --   --    PROTTOTAL 6.3*  --   --   --   --   --    BILITOTAL 0.5  --   --   --   --   --     ALKPHOS 80  --   --   --   --   --    ALT 39  --   --   --   --   --    AST 27  --   --   --   --   --        Imaging:   No results found for this or any previous visit (from the past 24 hour(s)).

## 2018-02-24 LAB
ANION GAP SERPL CALCULATED.3IONS-SCNC: 6 MMOL/L (ref 3–14)
BUN SERPL-MCNC: 5 MG/DL (ref 7–30)
CALCIUM SERPL-MCNC: 8.6 MG/DL (ref 8.5–10.1)
CHLORIDE SERPL-SCNC: 96 MMOL/L (ref 94–109)
CO2 SERPL-SCNC: 24 MMOL/L (ref 20–32)
CREAT SERPL-MCNC: 0.5 MG/DL (ref 0.52–1.04)
GFR SERPL CREATININE-BSD FRML MDRD: >90 ML/MIN/1.7M2
GLUCOSE SERPL-MCNC: 99 MG/DL (ref 70–99)
MAGNESIUM SERPL-MCNC: 1.8 MG/DL (ref 1.6–2.3)
OSMOLALITY SERPL: 260 MMOL/KG (ref 280–301)
PHOSPHATE SERPL-MCNC: 1 MG/DL (ref 2.5–4.5)
PHOSPHATE SERPL-MCNC: 2.1 MG/DL (ref 2.5–4.5)
POTASSIUM SERPL-SCNC: 4.3 MMOL/L (ref 3.4–5.3)
SODIUM SERPL-SCNC: 124 MMOL/L (ref 133–144)
SODIUM SERPL-SCNC: 126 MMOL/L (ref 133–144)
SODIUM SERPL-SCNC: 126 MMOL/L (ref 133–144)
SODIUM SERPL-SCNC: 127 MMOL/L (ref 133–144)
SODIUM SERPL-SCNC: 127 MMOL/L (ref 133–144)
T4 FREE SERPL-MCNC: 1.24 NG/DL (ref 0.76–1.46)
TSH SERPL DL<=0.005 MIU/L-ACNC: 0.59 MU/L (ref 0.4–4)
URATE SERPL-MCNC: 1.7 MG/DL (ref 2.6–6)

## 2018-02-24 PROCEDURE — 84100 ASSAY OF PHOSPHORUS: CPT | Performed by: HOSPITALIST

## 2018-02-24 PROCEDURE — 99232 SBSQ HOSP IP/OBS MODERATE 35: CPT | Performed by: HOSPITALIST

## 2018-02-24 PROCEDURE — 84100 ASSAY OF PHOSPHORUS: CPT | Performed by: INTERNAL MEDICINE

## 2018-02-24 PROCEDURE — 25000128 H RX IP 250 OP 636: Performed by: HOSPITALIST

## 2018-02-24 PROCEDURE — 84295 ASSAY OF SERUM SODIUM: CPT | Performed by: HOSPITALIST

## 2018-02-24 PROCEDURE — 84443 ASSAY THYROID STIM HORMONE: CPT | Performed by: INTERNAL MEDICINE

## 2018-02-24 PROCEDURE — 80048 BASIC METABOLIC PNL TOTAL CA: CPT | Performed by: INTERNAL MEDICINE

## 2018-02-24 PROCEDURE — 12000000 ZZH R&B MED SURG/OB

## 2018-02-24 PROCEDURE — 25000132 ZZH RX MED GY IP 250 OP 250 PS 637: Performed by: HOSPITALIST

## 2018-02-24 PROCEDURE — 84439 ASSAY OF FREE THYROXINE: CPT | Performed by: INTERNAL MEDICINE

## 2018-02-24 PROCEDURE — 36415 COLL VENOUS BLD VENIPUNCTURE: CPT | Performed by: INTERNAL MEDICINE

## 2018-02-24 PROCEDURE — 83735 ASSAY OF MAGNESIUM: CPT | Performed by: INTERNAL MEDICINE

## 2018-02-24 PROCEDURE — 25000125 ZZHC RX 250: Performed by: HOSPITALIST

## 2018-02-24 PROCEDURE — 36415 COLL VENOUS BLD VENIPUNCTURE: CPT | Performed by: HOSPITALIST

## 2018-02-24 PROCEDURE — 84550 ASSAY OF BLOOD/URIC ACID: CPT | Performed by: INTERNAL MEDICINE

## 2018-02-24 PROCEDURE — 25000132 ZZH RX MED GY IP 250 OP 250 PS 637: Performed by: INTERNAL MEDICINE

## 2018-02-24 PROCEDURE — 83930 ASSAY OF BLOOD OSMOLALITY: CPT | Performed by: INTERNAL MEDICINE

## 2018-02-24 RX ORDER — METOPROLOL SUCCINATE 50 MG/1
50 TABLET, EXTENDED RELEASE ORAL DAILY
Status: DISCONTINUED | OUTPATIENT
Start: 2018-02-25 | End: 2018-02-25 | Stop reason: HOSPADM

## 2018-02-24 RX ORDER — SODIUM CHLORIDE 9 MG/ML
INJECTION, SOLUTION INTRAVENOUS CONTINUOUS
Status: DISCONTINUED | OUTPATIENT
Start: 2018-02-24 | End: 2018-02-25

## 2018-02-24 RX ADMIN — TOPIRAMATE 25 MG: 25 TABLET, FILM COATED ORAL at 08:13

## 2018-02-24 RX ADMIN — TOPIRAMATE 25 MG: 25 TABLET, FILM COATED ORAL at 21:14

## 2018-02-24 RX ADMIN — POTASSIUM CHLORIDE 20 MEQ: 1.5 POWDER, FOR SOLUTION ORAL at 08:12

## 2018-02-24 RX ADMIN — POTASSIUM PHOSPHATE, MONOBASIC AND POTASSIUM PHOSPHATE, DIBASIC 20 MMOL: 224; 236 INJECTION, SOLUTION INTRAVENOUS at 09:17

## 2018-02-24 RX ADMIN — MAGNESIUM OXIDE TAB 400 MG (241.3 MG ELEMENTAL MG) 400 MG: 400 (241.3 MG) TAB at 08:12

## 2018-02-24 RX ADMIN — ACETAMINOPHEN 650 MG: 325 TABLET, FILM COATED ORAL at 23:00

## 2018-02-24 RX ADMIN — POTASSIUM PHOSPHATE, MONOBASIC AND POTASSIUM PHOSPHATE, DIBASIC 15 MMOL: 224; 236 INJECTION, SOLUTION INTRAVENOUS at 21:14

## 2018-02-24 RX ADMIN — SIMVASTATIN 40 MG: 20 TABLET, FILM COATED ORAL at 21:15

## 2018-02-24 RX ADMIN — SODIUM CHLORIDE: 9 INJECTION, SOLUTION INTRAVENOUS at 08:24

## 2018-02-24 RX ADMIN — CARBAMAZEPINE 200 MG: 200 TABLET ORAL at 08:13

## 2018-02-24 RX ADMIN — ACETAMINOPHEN 650 MG: 325 TABLET, FILM COATED ORAL at 10:50

## 2018-02-24 RX ADMIN — METOPROLOL SUCCINATE 25 MG: 25 TABLET, EXTENDED RELEASE ORAL at 08:13

## 2018-02-24 NOTE — PLAN OF CARE
Problem: Patient Care Overview  Goal: Plan of Care/Patient Progress Review  Outcome: Improving  Pt A&O x4. Independent to BR. Tolerating reg diet, adequate PO intake. Tele SR w/ 1st degree AVB. VSS on RA, denies pain. IVF NS 50 ml/h continuous. Na 124. K+ 4.3. Droplet precautions maintained. Nephrology following. Possible d/c today pending progress. Will continue to monitor.

## 2018-02-24 NOTE — PLAN OF CARE
Problem: Patient Care Overview  Goal: Plan of Care/Patient Progress Review  Outcome: Improving  Patient is A&O, VSS on RA, on Tele, IV infusing, regular diet, up with SBA, and K+ powder given. Potassium is finally back to normal 4.3 from 3.3. Possible d/c tomorrow, will continue to monitor.

## 2018-02-24 NOTE — PROGRESS NOTES
[facility]    Hospitalist Progress Note    Assessment & Plan   Love Pinon is a 62 year old female who was admitted on 2/22/2018 with hyponatremia and hypokalemia after treatment for a UTI    Hyponatremia, hypokalemia:   Thought 2/2 tegretol and thiazide, both being held  neph is following  Slow improvement, patient notes her unsteadiness is much improved  - Close sodium monitoring withg q 4  - IVF at 50 cc/hr per nephrology at this point  - Renal consulted, adjusting her medications  - once home medications finalized, phosphorus improved, and sodium improved can discharge home    Hypophosphatemia  - replete, on the protocol     H/o UTI: Urine culture was completed on February 16.  Per report, the patient had been on ciprofloxacin, completed about 4-5 days  - Repeat UC with no growth  - holding off on the cipro as she remains asymptomatic     H/o Influenza A: Patient does have a positive influenza a test from 16 February.    - Monitor.   - Droplet precautions.      Cardiac, HTN, HLD: Stable.   - PRN IV Labetalol and hydralazine.   - Hold PTA Atenolol-chlorthalidone  - PTA Simvastatin     H/o Seizure: Remote history of seizure in 1989.   - PTA Tegretol being weaned  - PTA Topamax       # Pain Assessment:   Current Pain Score 2/24/2018 2/23/2018 2/23/2018   Patient currently in pain? denies - yes   Pain score (0-10) - 2 5   Pain location - - -   Pain descriptors - - Headache   Love nance pain level was assessed and she currently denies pain.        DVT Prophylaxis: Pneumatic Compression Devices  Code Status: Full Code    Disposition: Expected discharge in 2 days once sodium is well controlled.    Shivam Osuna, DO  Text Page  (7am to 6pm)  Interval History   Eating and feeling great, her sodium is 126, but phosphorus is low at 1.0    -Data reviewed today: I reviewed all new labs and imaging results over the last 24 hours. I personally reviewed no images or EKG's today.    Physical Exam    Temp: 97.5  F (36.4  C) Temp src: Oral BP: (!) 140/107 Pulse: 83 Heart Rate: 81 Resp: 18 SpO2: 98 % O2 Device: None (Room air)    Vitals:    02/22/18 1957 02/23/18 0632 02/24/18 0500   Weight: 68 kg (149 lb 14.6 oz) 67.1 kg (147 lb 14.9 oz) 65 kg (143 lb 4.8 oz)     Vital Signs with Ranges  Temp:  [97.5  F (36.4  C)-97.9  F (36.6  C)] 97.5  F (36.4  C)  Pulse:  [66-83] 83  Heart Rate:  [81] 81  Resp:  [16-18] 18  BP: (133-143)/() 140/107  SpO2:  [98 %-99 %] 98 %  I/O last 3 completed shifts:  In: 1612 [P.O.:800; I.V.:812]  Out: 2300 [Urine:2300]    Constitutional: Awake, alert, cooperative, no apparent distress  Respiratory: Clear to auscultation bilaterally, no crackles or wheezing  Cardiovascular: Regular rate and rhythm, normal S1 and S2, and no murmur noted  GI: Normal bowel sounds, soft, non-distended, non-tender  Skin/Integumen: No rashes, no cyanosis, no edema  Other:     Medications     sodium chloride 50 mL/hr at 02/24/18 0824       potassium chloride  20 mEq Oral BID     magnesium oxide  400 mg Oral Daily     carBAMazepine  200 mg Oral Daily     metoprolol succinate  25 mg Oral Daily     sodium chloride (PF)  3 mL Intracatheter Q8H     simvastatin  40 mg Oral At Bedtime     topiramate  25 mg Oral BID       Data     Recent Labs  Lab 02/24/18  0620 02/23/18  2113 02/23/18  1550 02/23/18  0630  02/22/18  1456   WBC  --   --   --  3.6*  --  3.8*   HGB  --   --   --  13.1  --  14.9   MCV  --   --   --  89  --  87   PLT  --   --   --  267  --  289   *  --  124* 123*  < > 113*   POTASSIUM 4.3 4.3 3.3* 3.3*  < > 2.1*   CHLORIDE 96  --  91* 91*  < > 74*   CO2 24  --  21 24  < > 25   BUN 5*  --  5* 6*  < > 10   CR 0.50*  --  0.50* 0.52  < > 0.63   ANIONGAP 6  --  12 8  < > 14   BEBO 8.6  --  8.3* 8.3*  < > 8.8   GLC 99  --  102* 100*  < > 132*   ALBUMIN  --   --   --  3.2*  --   --    PROTTOTAL  --   --   --  6.3*  --   --    BILITOTAL  --   --   --  0.5  --   --    ALKPHOS  --   --   --  80  --   --     ALT  --   --   --  39  --   --    AST  --   --   --  27  --   --    < > = values in this interval not displayed.    Imaging:   No results found for this or any previous visit (from the past 24 hour(s)).

## 2018-02-24 NOTE — PROGRESS NOTES
Assessment and Plan:   Hyponatremia: Na 113 > > 127. Phos low. Appears to be malnourished. Getting MG and K supplementation. Will stop the K. Getting NS IV. Will replace phos.     Follow labs. D/C IVF in am. Likely discharge in am per IM.            Interval History:   Hypertension: on metoprolol. BP borderline. Will increase dose.            Review of Systems:   Feels well. Ambulating better with improved balance. Taking po well.           Medications:       potassium chloride  20 mEq Oral BID     magnesium oxide  400 mg Oral Daily     carBAMazepine  200 mg Oral Daily     metoprolol succinate  25 mg Oral Daily     sodium chloride (PF)  3 mL Intracatheter Q8H     simvastatin  40 mg Oral At Bedtime     topiramate  25 mg Oral BID       sodium chloride 50 mL/hr at 18 0824     Current active medications and PTA medications reviewed, see medication list for details.            Physical Exam:   Vitals were reviewed  Patient Vitals for the past 24 hrs:   BP Temp Temp src Pulse Heart Rate Resp SpO2 Weight   18 1050 - - - - - 16 - -   18 0802 (!) 140/107 97.5  F (36.4  C) Oral 83 - 18 98 % -   18 0500 - - - - - - - 65 kg (143 lb 4.8 oz)   18 0134 (!) 135/94 97.5  F (36.4  C) Oral - 81 18 98 % -   18 1600 133/86 97.9  F (36.6  C) Oral 69 - 16 99 % -   18 1300 - - - - - 16 - -   18 1200 143/90 97.7  F (36.5  C) Oral 66 - 18 98 % -       Temp:  [97.5  F (36.4  C)-97.9  F (36.6  C)] 97.5  F (36.4  C)  Pulse:  [66-83] 83  Heart Rate:  [81] 81  Resp:  [16-18] 16  BP: (133-143)/() 140/107  SpO2:  [98 %-99 %] 98 %    Temperatures:  Current - Temp: 97.5  F (36.4  C); Max - Temp  Av.7  F (36.5  C)  Min: 97.5  F (36.4  C)  Max: 97.9  F (36.6  C)  Respiration range: Resp  Av  Min: 16  Max: 18  Pulse range: Pulse  Av.7  Min: 66  Max: 83  Blood pressure range: Systolic (24hrs), Av , Min:133 , Max:143   ; Diastolic (24hrs), Av, Min:86, Max:107    Pulse  oximetry range: SpO2  Av.3 %  Min: 98 %  Max: 99 %    I/O last 3 completed shifts:  In: 1612 [P.O.:800; I.V.:812]  Out: 2300 [Urine:2300]      Intake/Output Summary (Last 24 hours) at 18 1147  Last data filed at 18 0700   Gross per 24 hour   Intake             2226 ml   Output             1700 ml   Net              526 ml       Alert, sitting up in chair  Lungs with clear BS  Cor RRR nl S1 S2 no M  LE no edema    I/O /2275    Weights (last 365 days)      Date/Time Weight Who     18 0500 65 kg (143 lb 4.8 oz) BL     18 0632 67.1 kg (147 lb 14.9 oz) JA     18 1957 68 kg (149 lb 14.6 oz) DC     18 1343 68 kg (150 lb)             Wt Readings from Last 4 Encounters:   18 65 kg (143 lb 4.8 oz)   18 67.6 kg (149 lb)   10/10/17 71.5 kg (157 lb 9.6 oz)   17 69.9 kg (154 lb)          Data:          Lab Results   Component Value Date     2018     2018     2018    Lab Results   Component Value Date    CHLORIDE 96 2018    CHLORIDE 91 2018    CHLORIDE 91 2018    Lab Results   Component Value Date    BUN 5 2018    BUN 5 2018    BUN 6 2018      Lab Results   Component Value Date    POTASSIUM 4.3 2018    POTASSIUM 4.3 2018    POTASSIUM 3.3 2018    Lab Results   Component Value Date    CO2 24 2018    CO2 21 2018    CO2 24 2018    Lab Results   Component Value Date    CR 0.50 2018    CR 0.50 2018    CR 0.52 2018        Recent Labs   Lab Test  18   0630  18   1456  16   0902   WBC  3.6*  3.8*  4.6   HGB  13.1  14.9  14.2   HCT  34.1*  37.8  39.7   MCV  89  87  97   PLT  267  289  256     Recent Labs   Lab Test  18   0630  17   0923  17   0847   AST  27  24  21   ALT  39  36  33   ALKPHOS  80  88  95   BILITOTAL  0.5  0.4  0.4       Recent Labs   Lab Test  18   0620  18   1456   MAG  1.8  1.8     Recent  Labs   Lab Test  02/24/18   0620   PHOS  1.0*     Recent Labs   Lab Test  02/24/18   0620  02/23/18   1550  02/23/18   0630   BEBO  8.6  8.3*  8.3*       Lab Results   Component Value Date    BEBO 8.6 02/24/2018     Lab Results   Component Value Date    WBC 3.6 (L) 02/23/2018    HGB 13.1 02/23/2018    HCT 34.1 (L) 02/23/2018    MCV 89 02/23/2018     02/23/2018     Lab Results   Component Value Date     (L) 02/24/2018    POTASSIUM 4.3 02/24/2018    CHLORIDE 96 02/24/2018    CO2 24 02/24/2018    GLC 99 02/24/2018     Lab Results   Component Value Date    BUN 5 (L) 02/24/2018    CR 0.50 (L) 02/24/2018     Lab Results   Component Value Date    MAG 1.8 02/24/2018     Lab Results   Component Value Date    PHOS 1.0 (L) 02/24/2018       Creatinine   Date Value Ref Range Status   02/24/2018 0.50 (L) 0.52 - 1.04 mg/dL Final   02/23/2018 0.50 (L) 0.52 - 1.04 mg/dL Final   02/23/2018 0.52 0.52 - 1.04 mg/dL Final   02/22/2018 0.66 0.52 - 1.04 mg/dL Final   02/22/2018 Canceled, Test credited 0.52 - 1.04 mg/dL Final     Comment:     Unsatisfactory specimen - hemolyzed  NOTIFIED ED ON TRACKBOARD AT 1725 MA     02/22/2018 0.63 0.52 - 1.04 mg/dL Final       Attestation:  I have reviewed today's vital signs, notes, medications, labs and imaging.     Dm Pollard MD

## 2018-02-24 NOTE — PLAN OF CARE
Problem: Patient Care Overview  Goal: Plan of Care/Patient Progress Review  Outcome: Improving  Pt continues to feel better, BP elevated, other VSS. Lungs CTA, no cough noted. Tolerates walking in halls. Up independent now in the room. Phosphorous replacement started. Na+ 126 today.

## 2018-02-25 VITALS
BODY MASS INDEX: 23.84 KG/M2 | HEIGHT: 66 IN | SYSTOLIC BLOOD PRESSURE: 157 MMHG | TEMPERATURE: 97.7 F | WEIGHT: 148.37 LBS | OXYGEN SATURATION: 98 % | DIASTOLIC BLOOD PRESSURE: 104 MMHG | RESPIRATION RATE: 16 BRPM | HEART RATE: 75 BPM

## 2018-02-25 LAB
ANION GAP SERPL CALCULATED.3IONS-SCNC: 8 MMOL/L (ref 3–14)
BUN SERPL-MCNC: 6 MG/DL (ref 7–30)
CALCIUM SERPL-MCNC: 8.6 MG/DL (ref 8.5–10.1)
CHLORIDE SERPL-SCNC: 96 MMOL/L (ref 94–109)
CO2 SERPL-SCNC: 24 MMOL/L (ref 20–32)
CREAT SERPL-MCNC: 0.55 MG/DL (ref 0.52–1.04)
GFR SERPL CREATININE-BSD FRML MDRD: >90 ML/MIN/1.7M2
GLUCOSE SERPL-MCNC: 103 MG/DL (ref 70–99)
MAGNESIUM SERPL-MCNC: 1.9 MG/DL (ref 1.6–2.3)
PHOSPHATE SERPL-MCNC: 3 MG/DL (ref 2.5–4.5)
POTASSIUM SERPL-SCNC: 3.8 MMOL/L (ref 3.4–5.3)
SODIUM SERPL-SCNC: 126 MMOL/L (ref 133–144)
SODIUM SERPL-SCNC: 128 MMOL/L (ref 133–144)
SODIUM SERPL-SCNC: 129 MMOL/L (ref 133–144)

## 2018-02-25 PROCEDURE — 36415 COLL VENOUS BLD VENIPUNCTURE: CPT | Performed by: HOSPITALIST

## 2018-02-25 PROCEDURE — 84295 ASSAY OF SERUM SODIUM: CPT | Performed by: HOSPITALIST

## 2018-02-25 PROCEDURE — 99239 HOSP IP/OBS DSCHRG MGMT >30: CPT | Performed by: HOSPITALIST

## 2018-02-25 PROCEDURE — 25000132 ZZH RX MED GY IP 250 OP 250 PS 637: Performed by: INTERNAL MEDICINE

## 2018-02-25 PROCEDURE — 25000132 ZZH RX MED GY IP 250 OP 250 PS 637: Performed by: HOSPITALIST

## 2018-02-25 PROCEDURE — 80048 BASIC METABOLIC PNL TOTAL CA: CPT | Performed by: HOSPITALIST

## 2018-02-25 PROCEDURE — 25000128 H RX IP 250 OP 636: Performed by: HOSPITALIST

## 2018-02-25 PROCEDURE — 84100 ASSAY OF PHOSPHORUS: CPT | Performed by: HOSPITALIST

## 2018-02-25 PROCEDURE — 83735 ASSAY OF MAGNESIUM: CPT | Performed by: HOSPITALIST

## 2018-02-25 RX ORDER — AMLODIPINE BESYLATE 10 MG/1
10 TABLET ORAL DAILY
Qty: 30 TABLET | Refills: 0 | Status: SHIPPED | OUTPATIENT
Start: 2018-02-25 | End: 2018-04-10

## 2018-02-25 RX ORDER — METOPROLOL SUCCINATE 50 MG/1
50 TABLET, EXTENDED RELEASE ORAL DAILY
Qty: 30 TABLET | Refills: 0 | Status: SHIPPED | OUTPATIENT
Start: 2018-02-25 | End: 2018-11-20

## 2018-02-25 RX ORDER — AMLODIPINE BESYLATE 10 MG/1
10 TABLET ORAL DAILY
Status: DISCONTINUED | OUTPATIENT
Start: 2018-02-25 | End: 2018-02-25 | Stop reason: HOSPADM

## 2018-02-25 RX ADMIN — TOPIRAMATE 25 MG: 25 TABLET, FILM COATED ORAL at 07:59

## 2018-02-25 RX ADMIN — ACETAMINOPHEN 650 MG: 325 TABLET, FILM COATED ORAL at 07:59

## 2018-02-25 RX ADMIN — MAGNESIUM OXIDE TAB 400 MG (241.3 MG ELEMENTAL MG) 400 MG: 400 (241.3 MG) TAB at 07:59

## 2018-02-25 RX ADMIN — SODIUM CHLORIDE: 9 INJECTION, SOLUTION INTRAVENOUS at 11:06

## 2018-02-25 RX ADMIN — METOPROLOL SUCCINATE 50 MG: 50 TABLET, EXTENDED RELEASE ORAL at 07:59

## 2018-02-25 NOTE — PLAN OF CARE
Problem: Patient Care Overview  Goal: Plan of Care/Patient Progress Review  Outcome: Adequate for Discharge Date Met: 02/25/18  Pt c/o of persistent HA, rated at an 8 today, tylenol helped a little. BP remains elevated, other VSS. Lungs CTA, dry cough. Pt planning to discharge to home this afternoon.  Goal: Discharge Needs Assessment  Outcome: Adequate for Discharge Date Met: 02/25/18  Pt discharged with belongings and filled RX's via wheelchair, friend transporting her to home.

## 2018-02-25 NOTE — DISCHARGE SUMMARY
[facility]    Discharge Summary  Hospitalist    Date of Admission:  2/22/2018  Date of Discharge:  2/25/2018  3:15 PM  Discharging Provider: Shivam Osuna DO    Discharge Diagnoses   Hyponatremia  Hypokalemia  Hypophosphatemia  H/o UTI        History of Present Illness   Love Pinon is an 62 year old female who presented with generalized weakness in the setting of recovery from UTI and influenza. She was found to have severe hyponatremia of 113.    Hospital Course   Love Pinon was admitted on 2/22/2018.  The following problems were addressed during her hospitalization:    Active Problems:    Hyponatremia  She was admitted and started on fluids and electrolyte replacement. 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 Her sodium slowly improved, and her electrolytes improved. She was started on amlodipine and metoprolol to replace her PTA regimen. Her weakness resolved with correction of her sodium, which was 129 on discharge. She will need to follow up with her PCP and nephrology on discharge for BP and Na recheck    # Discharge Pain Plan:   - Patient currently has NO PAIN and is not being prescribed pain medications on discharge.      Shivam Osuna DO    Significant Results and Procedures   non    Pending Results   These results will be followed up by PCP  Unresulted Labs Ordered in the Past 30 Days of this Admission     No orders found from 12/24/2017 to 2/23/2018.          Code Status   Full Code       Primary Care Physician   Kenna Davis    Physical Exam   Temp: 97.7  F (36.5  C) Temp src: Oral BP: (!) 157/104   Heart Rate: 86 Resp: 16 SpO2: 98 % O2 Device: None (Room air)    Vitals:    02/23/18 0632 02/24/18 0500 02/25/18 0639   Weight: 67.1 kg (147 lb 14.9 oz) 65 kg (143 lb 4.8 oz) 67.3 kg (148 lb 5.9 oz)     Vital Signs with Ranges  Temp:  [97.7  F (36.5   C)-98  F (36.7  C)] 97.7  F (36.5  C)  Heart Rate:  [79-87] 86  Resp:  [16-18] 16  BP: (129-157)/() 157/104  SpO2:  [97 %-100 %] 98 %  I/O last 3 completed shifts:  In: 1750 [P.O.:775; I.V.:975]  Out: 3875 [Urine:3875]    Constitutional: Awake, alert, cooperative, no apparent distress.  Eyes: Conjunctiva and pupils examined and normal.  HEENT: Moist mucous membranes, normal dentition.  Respiratory: Clear to auscultation bilaterally, no crackles or wheezing.  Cardiovascular: Regular rate and rhythm, normal S1 and S2, and no murmur noted.  GI: Soft, non-distended, non-tender, normal bowel sounds.  Lymph/Hematologic: No anterior cervical or supraclavicular adenopathy.  Skin: No rashes, no cyanosis, no edema.  Musculoskeletal: No joint swelling, erythema or tenderness.  Neurologic: Cranial nerves 2-12 intact, normal strength and sensation.  Psychiatric: Alert, oriented to person, place and time, no obvious anxiety or depression.    Discharge Disposition   Discharged to home  Condition at discharge: Stable    Consultations This Hospital Stay   NEPHROLOGY IP CONSULT    Time Spent on this Encounter   IShivam, personally saw the patient today and spent greater than 30 minutes discharging this patient.    Discharge Orders     Reason for your hospital stay   Low salt (sodium, hyponatremia) secondary to medications and recent urinary infection     Follow-up and recommended labs and tests    Follow up with primary care provider, Kenna Davis, within 7 days to evaluate medication change and for hospital follow- up.  The following labs/tests are recommended: bmp    Schedule followup with nephrology as written in 2-4 weeks     Activity   Your activity upon discharge: activity as tolerated     Full Code     Diet   Follow this diet upon discharge: Orders Placed This Encounter     Regular Diet Adult       Discharge Medications   Discharge Medication List as of 2/25/2018  1:07 PM      START taking  these medications    Details   metoprolol succinate (TOPROL-XL) 50 MG 24 hr tablet Take 1 tablet (50 mg) by mouth daily, Disp-30 tablet, R-0, E-Prescribe      amLODIPine (NORVASC) 10 MG tablet Take 1 tablet (10 mg) by mouth daily, Disp-30 tablet, R-0, E-Prescribe         CONTINUE these medications which have NOT CHANGED    Details   prochlorperazine (COMPAZINE) 10 MG tablet Take 1 tablet (10 mg) by mouth every 8 hours as needed for nausea or vomiting, Disp-20 tablet, R-1, E-Prescribe      calcium carbonate-vitamin D (CALCIUM + D) 600-200 MG-UNIT TABS Take 1 tablet by mouth daily, Disp-60 tablet, R-0, Historical      topiramate (TOPAMAX) 25 MG tablet Take 1 tablet (25 mg) by mouth 2 times daily, Disp-180 tablet, R-3, E-Prescribe      simvastatin (ZOCOR) 40 MG tablet Take 1 tablet (40 mg) by mouth At Bedtime, Disp-90 tablet, R-3, E-Prescribe      senna-docusate (SENOKOT-S;PERICOLACE) 8.6-50 MG per tablet Take 1 tablet by mouth daily, Disp-28 tablet, R-0, Historical      cetirizine (KLS ALLER-JUWAN) 10 MG tablet Take 10 mg by mouth daily., Historical      Cranberry 500 MG CAPS Take  by mouth. Daily  , Historical      FISH OIL 1000 MG OR CAPS Twice daily, Historical         STOP taking these medications       ciprofloxacin (CIPRO) 500 MG tablet Comments:   Reason for Stopping:         atenolol-chlorthalidone (TENORETIC 100) 100-25 MG per tablet Comments:   Reason for Stopping:         carBAMazepine (TEGRETOL) 200 MG tablet Comments:   Reason for Stopping:             Allergies   Allergies   Allergen Reactions     Aramine [Metaraminol Bitartrate]      Penicillins      Hives, swelling     Data   Most Recent 3 CBC's:  Recent Labs   Lab Test  02/23/18   0630  02/22/18   1456  02/09/16   0902   WBC  3.6*  3.8*  4.6   HGB  13.1  14.9  14.2   MCV  89  87  97   PLT  267  289  256      Most Recent 3 BMP's:  Recent Labs   Lab Test  02/25/18   1050  02/25/18   0635  02/25/18   0215   02/24/18   0620  02/23/18   2113  02/23/18   1557    NA  129*  128*  126*   < >  126*   --   124*   POTASSIUM   --   3.8   --    --   4.3  4.3  3.3*   CHLORIDE   --   96   --    --   96   --   91*   CO2   --   24   --    --   24   --   21   BUN   --   6*   --    --   5*   --   5*   CR   --   0.55   --    --   0.50*   --   0.50*   ANIONGAP   --   8   --    --   6   --   12   BEBO   --   8.6   --    --   8.6   --   8.3*   GLC   --   103*   --    --   99   --   102*    < > = values in this interval not displayed.     Most Recent 2 LFT's:  Recent Labs   Lab Test  02/23/18   0630  08/14/17   0923   AST  27  24   ALT  39  36   ALKPHOS  80  88   BILITOTAL  0.5  0.4     Most Recent INR's and Anticoagulation Dosing History:  Anticoagulation Dose History     There is no flowsheet data to display.        Most Recent 3 Troponin's:No lab results found.  Most Recent Cholesterol Panel:  Recent Labs   Lab Test  02/22/17   0847   CHOL  226*   LDL  101*   HDL  106   TRIG  96     Most Recent 6 Bacteria Isolates From Any Culture (See EPIC Reports for Culture Details):  Recent Labs   Lab Test  02/22/18   1600  02/16/18   1148  10/10/17   1514  06/22/16   0747  04/03/15   1341  02/06/15   1048   CULT  No growth  50,000 to 100,000 colonies/mL  Coagulase negative Staphylococcus  *  10,000 to 50,000 colonies/mL  Strain 2  Coagulase negative Staphylococcus  *  50,000 to 100,000 colonies/mL  Escherichia coli  *  >100,000 colonies/mL mixed urogenital oc Susceptibility testing not routinely   done    >100,000 colonies/mL Citrobacter freundii complex*  >100,000 colonies/mL Alpha hemolytic Streptococcus Susceptibility testing not   routinely done  <10,000 colonies/mL urogenital oc  *     Most Recent TSH, T4 and A1c Labs:  Recent Labs   Lab Test  02/24/18   0620   TSH  0.59   T4  1.24     Results for orders placed or performed during the hospital encounter of 02/22/18   XR Chest 2 Views    Narrative    XR CHEST 2 VW 2/22/2018 2:33 PM    HISTORY: Cough.    COMPARISON: None.      Impression     IMPRESSION: The lungs are clear. No focal pulmonary opacities. Heart  and mediastinum are unremarkable. No acute cardiopulmonary  abnormalities.    JAZZMINE SALEH MD

## 2018-02-25 NOTE — PROGRESS NOTES
Assessment and Plan:   Hyponatremia: improving. Other lytes show low K. HCO3 and Mg ok. Phosphorus improved. Off thiazide and tegretol. UO 2500 ml yest.     Ok for discharge. F/U with our NP in 2-4 weeks. Santi, [phone].            Interval History:   Hypertension: remains elevated. Will add CCB.                    Review of Systems:   C/O headache which she feels is due to high BP. taking po well with no N or V.          Medications:       metoprolol succinate  50 mg Oral Daily     magnesium oxide  400 mg Oral Daily     sodium chloride (PF)  3 mL Intracatheter Q8H     simvastatin  40 mg Oral At Bedtime     topiramate  25 mg Oral BID       sodium chloride 50 mL/hr at 18 1106     Current active medications and PTA medications reviewed, see medication list for details.            Physical Exam:   Vitals were reviewed  Patient Vitals for the past 24 hrs:   BP Temp Temp src Pulse Heart Rate Resp SpO2 Weight   18 0800 (!) 145/107 98  F (36.7  C) Oral - 86 16 97 % -   18 0759 - - - - - 16 - -   18 0639 - - - - - - - 67.3 kg (148 lb 5.9 oz)   18 0011 (!) 129/91 97.8  F (36.6  C) Oral - 79 18 100 % -   18 1500 (!) 142/93 97.9  F (36.6  C) Oral 75 - 18 96 % -       Temp:  [97.8  F (36.6  C)-98  F (36.7  C)] 98  F (36.7  C)  Pulse:  [75] 75  Heart Rate:  [79-86] 86  Resp:  [16-18] 16  BP: (129-145)/() 145/107  SpO2:  [96 %-100 %] 97 %    Temperatures:  Current - Temp: 98  F (36.7  C); Max - Temp  Av.9  F (36.6  C)  Min: 97.8  F (36.6  C)  Max: 98  F (36.7  C)  Respiration range: Resp  Av  Min: 16  Max: 18  Pulse range: Pulse  Av  Min: 75  Max: 75  Blood pressure range: Systolic (24hrs), Av , Min:129 , Max:145   ; Diastolic (24hrs), Av, Min:91, Max:107    Pulse oximetry range: SpO2  Av.7 %  Min: 96 %  Max: 100 %    I/O last 3 completed shifts:  In: 2339 [P.O.:750; I.V.:1589]  Out: 2475 [Urine:2475]      Intake/Output Summary (Last 24 hours)  at 02/25/18 1205  Last data filed at 02/25/18 1100   Gross per 24 hour   Intake             1325 ml   Output             3575 ml   Net            -2250 ml     Alert, responsive  LE no edema  Lungs with faint bibasilar rales  Cor RRR nl S1 S2 no M       Wt Readings from Last 4 Encounters:   02/25/18 67.3 kg (148 lb 5.9 oz)   02/16/18 67.6 kg (149 lb)   10/10/17 71.5 kg (157 lb 9.6 oz)   08/14/17 69.9 kg (154 lb)          Data:          Lab Results   Component Value Date     02/25/2018     02/25/2018     02/25/2018    Lab Results   Component Value Date    CHLORIDE 96 02/25/2018    CHLORIDE 96 02/24/2018    CHLORIDE 91 02/23/2018    Lab Results   Component Value Date    BUN 6 02/25/2018    BUN 5 02/24/2018    BUN 5 02/23/2018      Lab Results   Component Value Date    POTASSIUM 3.8 02/25/2018    POTASSIUM 4.3 02/24/2018    POTASSIUM 4.3 02/23/2018    Lab Results   Component Value Date    CO2 24 02/25/2018    CO2 24 02/24/2018    CO2 21 02/23/2018    Lab Results   Component Value Date    CR 0.55 02/25/2018    CR 0.50 02/24/2018    CR 0.50 02/23/2018        Recent Labs   Lab Test  02/23/18   0630  02/22/18   1456  02/09/16   0902   WBC  3.6*  3.8*  4.6   HGB  13.1  14.9  14.2   HCT  34.1*  37.8  39.7   MCV  89  87  97   PLT  267  289  256     Recent Labs   Lab Test  02/23/18   0630  08/14/17   0923  02/22/17   0847   AST  27  24  21   ALT  39  36  33   ALKPHOS  80  88  95   BILITOTAL  0.5  0.4  0.4       Recent Labs   Lab Test  02/25/18   0635  02/24/18   0620  02/22/18   1456   MAG  1.9  1.8  1.8     Recent Labs   Lab Test  02/25/18   0635  02/24/18   1750  02/24/18   0620   PHOS  3.0  2.1*  1.0*     Recent Labs   Lab Test  02/25/18   0635  02/24/18   0620  02/23/18   1550   BEBO  8.6  8.6  8.3*       Lab Results   Component Value Date    BEBO 8.6 02/25/2018     Lab Results   Component Value Date    WBC 3.6 (L) 02/23/2018    HGB 13.1 02/23/2018    HCT 34.1 (L) 02/23/2018    MCV 89 02/23/2018      02/23/2018     Lab Results   Component Value Date     (L) 02/25/2018    POTASSIUM 3.8 02/25/2018    CHLORIDE 96 02/25/2018    CO2 24 02/25/2018     (H) 02/25/2018     Lab Results   Component Value Date    BUN 6 (L) 02/25/2018    CR 0.55 02/25/2018     Lab Results   Component Value Date    MAG 1.9 02/25/2018     Lab Results   Component Value Date    PHOS 3.0 02/25/2018       Creatinine   Date Value Ref Range Status   02/25/2018 0.55 0.52 - 1.04 mg/dL Final   02/24/2018 0.50 (L) 0.52 - 1.04 mg/dL Final   02/23/2018 0.50 (L) 0.52 - 1.04 mg/dL Final   02/23/2018 0.52 0.52 - 1.04 mg/dL Final   02/22/2018 0.66 0.52 - 1.04 mg/dL Final   02/22/2018 Canceled, Test credited 0.52 - 1.04 mg/dL Final     Comment:     Unsatisfactory specimen - hemolyzed  NOTIFIED ED ON TRACKBOARD AT 1725 MA         Attestation:  I have reviewed today's vital signs, notes, medications, labs and imaging.     Dm Pollard MD

## 2018-02-25 NOTE — PLAN OF CARE
Problem: Patient Care Overview  Goal: Plan of Care/Patient Progress Review  Outcome: Improving  Patient is A&O, VSS on RA, on Tele, IV infusing, regular diet, up independently, and Phosphorus replaced. Will continue to monitor.

## 2018-02-25 NOTE — PLAN OF CARE
Problem: Patient Care Overview  Goal: Plan of Care/Patient Progress Review  Outcome: Improving  Pt A&O x4. Na 126. Phosphorus 2.1, protocol given. /91. All other VSS on RA, denies pain. Independent to BR. Tolerating regular diet, adequate PO intake. Droplet precautions maintained. IVF NS 50 ml/h continuous. Tele SR w/ 1st degree AVB. D/C pending progress. Will continue to monitor.

## 2018-02-26 ENCOUNTER — OFFICE VISIT (OUTPATIENT)
Dept: FAMILY MEDICINE | Facility: CLINIC | Age: 63
End: 2018-02-26
Payer: COMMERCIAL

## 2018-02-26 ENCOUNTER — TELEPHONE (OUTPATIENT)
Dept: FAMILY MEDICINE | Facility: CLINIC | Age: 63
End: 2018-02-26

## 2018-02-26 VITALS
TEMPERATURE: 97.5 F | HEIGHT: 66 IN | HEART RATE: 91 BPM | DIASTOLIC BLOOD PRESSURE: 88 MMHG | WEIGHT: 146 LBS | OXYGEN SATURATION: 99 % | SYSTOLIC BLOOD PRESSURE: 127 MMHG | BODY MASS INDEX: 23.46 KG/M2

## 2018-02-26 DIAGNOSIS — I10 HYPERTENSION GOAL BP (BLOOD PRESSURE) < 140/90: ICD-10-CM

## 2018-02-26 DIAGNOSIS — E87.1 HYPONATREMIA: ICD-10-CM

## 2018-02-26 DIAGNOSIS — G44.89 OTHER HEADACHE SYNDROME: ICD-10-CM

## 2018-02-26 DIAGNOSIS — G40.909 SEIZURE DISORDER (H): ICD-10-CM

## 2018-02-26 DIAGNOSIS — Z09 HOSPITAL DISCHARGE FOLLOW-UP: Primary | ICD-10-CM

## 2018-02-26 PROCEDURE — 83735 ASSAY OF MAGNESIUM: CPT | Performed by: FAMILY MEDICINE

## 2018-02-26 PROCEDURE — 84100 ASSAY OF PHOSPHORUS: CPT | Performed by: FAMILY MEDICINE

## 2018-02-26 PROCEDURE — 36415 COLL VENOUS BLD VENIPUNCTURE: CPT | Performed by: FAMILY MEDICINE

## 2018-02-26 PROCEDURE — 99495 TRANSJ CARE MGMT MOD F2F 14D: CPT | Performed by: FAMILY MEDICINE

## 2018-02-26 PROCEDURE — 80053 COMPREHEN METABOLIC PANEL: CPT | Performed by: FAMILY MEDICINE

## 2018-02-26 NOTE — TELEPHONE ENCOUNTER
Pt was discharged from [facility] on 2/25/18 after being treated for Hypertension Goal Bp (Blood Pressure) < 140/90, Hyponatremia. Please call the pt. Thank you.  Karen Sahu,

## 2018-02-26 NOTE — PATIENT INSTRUCTIONS
Take medications as directed.  Treatment  and symptomatic cares discussed   Referral given given   Follow up if problem or concern

## 2018-02-26 NOTE — MR AVS SNAPSHOT
After Visit Summary   2/26/2018    Love Pinon    MRN: [id number]           Patient Information     Date Of Birth          1955        Visit Information        Provider Department      2/26/2018 3:40 PM Bri House MD [facility] [place]        Today's Diagnoses     Hyponatremia    -  1    Hypertension goal BP (blood pressure) < 140/90        Seizure disorder (H)        Other headache syndrome        Neck pain          Care Instructions    Take medications as directed.  Treatment  and symptomatic cares discussed   Referral given given   Follow up if problem or concern             Follow-ups after your visit        Additional Services     NEUROLOGY ADULT REFERRAL       Your provider has referred you for the following:   Consult at [facility]: [facility] [phone]   [email or web address]  N: [facility] [phone]   [email or web address]  [place] [phone]   [email or web address]    Please be aware that coverage of these services is subject to the terms and limitations of your health insurance plan.  Call member services at your health plan with any benefit or coverage questions.      Please bring the following with you to your appointment:    (1) Any X-Rays, CTs or MRIs which have been performed.  Contact the facility where they were done to arrange for  prior to your scheduled appointment.    (2) List of current medications  (3) This referral request   (4) Any documents/labs given to you for this referral                  Who to contact     If you have questions or need follow up information about today's clinic visit or your schedule please contact [facility] [place] directly at [phone].  Normal or non-critical lab and imaging results will be communicated to you by MyChart, letter or phone within 4  "business days after the clinic has received the results. If you do not hear from us within 7 days, please contact the clinic through [employer] or phone. If you have a critical or abnormal lab result, we will notify you by phone as soon as possible.  Submit refill requests through [employer] or call your pharmacy and they will forward the refill request to us. Please allow 3 business days for your refill to be completed.          Additional Information About Your Visit        [employer] Information     [employer] gives you secure access to your electronic health record. If you see a primary care provider, you can also send messages to your care team and make appointments. If you have questions, please call your primary care clinic.  If you do not have a primary care provider, please call [phone] and they will assist you.        Care EveryWhere ID     This is your Care EveryWhere ID. This could be used by other organizations to access your [place] medical records  [id number]        Your Vitals Were     Pulse Temperature Height Pulse Oximetry BMI (Body Mass Index)       91 97.5  F (36.4  C) (Tympanic) 5' 6\" (1.676 m) 99% 23.57 kg/m2        Blood Pressure from Last 3 Encounters:   02/26/18 127/88   02/25/18 (!) 157/104   02/16/18 113/80    Weight from Last 3 Encounters:   02/26/18 146 lb (66.2 kg)   02/25/18 148 lb 5.9 oz (67.3 kg)   02/16/18 149 lb (67.6 kg)              We Performed the Following     Comprehensive metabolic panel     Magnesium     NEUROLOGY ADULT REFERRAL     Phosphorus          Today's Medication Changes          These changes are accurate as of 2/26/18  4:45 PM.  If you have any questions, ask your nurse or doctor.               These medicines have changed or have updated prescriptions.        Dose/Directions    simvastatin 40 MG tablet   Commonly known as:  ZOCOR   This may have changed:  how much to take   Used for:  Hyperlipidemia LDL goal <130        Dose:  40 mg   Take 1 tablet (40 mg) by mouth " At Bedtime   Quantity:  90 tablet   Refills:  3                Primary Care Provider    Kenna Davis PA-C       No address on file        Equal Access to Services     RINKU LOMAX : Regis nagi long chapincito [place], wavijayada lumohit, alma rosata kasourav walsh, emma winters juancriselda boswellsahara margarita. So [facility] [phone].    ATENCIÓN: Si habla español, tiene a amanda disposición servicios gratuitos de asistencia lingüística. Llame al [phone].    We comply with applicable federal civil rights laws and [place] laws. We do not discriminate on the basis of race, color, national origin, age, disability, sex, sexual orientation, or gender identity.            Thank you!     [facility] you [facility] your [facility]. Our goal is always to provide you with excellent care. Hearing back from our patients is one way we can continue to improve our services. Please take a few minutes to complete the written survey that you may receive in the mail after your visit with us. Thank you!             Your Updated Medication List - Protect others around you: Learn how to safely use, store and throw away your medicines at www.disposemymeds.org.          This list is accurate as of 2/26/18  4:45 PM.  Always use your most recent med list.                   Brand Name Dispense Instructions for use Diagnosis    amLODIPine 10 MG tablet    NORVASC    30 tablet    Take 1 tablet (10 mg) by mouth daily    Hypertension goal BP (blood pressure) < 140/90       calcium + D 600-200 MG-UNIT Tabs   Generic drug:  calcium carbonate-vitamin D     60 tablet    Take 1 tablet by mouth daily        Cranberry 500 MG Caps      Take  by mouth. Daily        fish oil-omega-3 fatty acids 1000 MG capsule      Twice daily        KLS ALLER-JUWAN 10 MG tablet   Generic drug:  cetirizine      Take 10 mg by mouth daily.        metoprolol succinate 50 MG 24 hr tablet    TOPROL-XL    30 tablet    Take 1 tablet (50 mg) by mouth daily     Hypertension goal BP (blood pressure) < 140/90       senna-docusate 8.6-50 MG per tablet    SENOKOT-S;PERICOLACE    28 tablet    Take 1 tablet by mouth daily        simvastatin 40 MG tablet    ZOCOR    90 tablet    Take 1 tablet (40 mg) by mouth At Bedtime    Hyperlipidemia LDL goal <130       topiramate 25 MG tablet    TOPAMAX    180 tablet    Take 1 tablet (25 mg) by mouth 2 times daily    Intractable migraine without aura and without status migrainosus

## 2018-02-26 NOTE — NURSING NOTE
"Chief Complaint   Patient presents with     Hospital F/U       Initial /88  Pulse 91  Temp 97.5  F (36.4  C) (Tympanic)  Ht 5' 6\" (1.676 m)  Wt 146 lb (66.2 kg)  SpO2 99%  BMI 23.57 kg/m2 Estimated body mass index is 23.57 kg/(m^2) as calculated from the following:    Height as of this encounter: 5' 6\" (1.676 m).    Weight as of this encounter: 146 lb (66.2 kg).  Medication Reconciliation: complete  "

## 2018-02-26 NOTE — PROGRESS NOTES
SUBJECTIVE:   Love Pinon is a 62 year old female who presents to clinic today for the following health issues:          Hospital Follow-up Visit:    Hospital/Nursing Home/IP Rehab Facility: [facility]  Date of Admission: 2/22/2018  Date of Discharge: 2/25/2018  Reason(s) for Admission: Hyponatremia  Hypokalemia  Hypophosphatemia  H/o UTI            Problems taking medications regularly:  None       Medication changes since discharge: NONE        Problems adhering to non-medication therapy:  None    Summary of hospitalization:  [facility] discharge summary reviewed.     Diagnostic Tests/Treatments reviewed.    Follow up needed: wants referral to neuroogy for follow u, bc of her hx of seziure , and she was taken off her seizure med's during this recent hospitalization, so not sure if she should stay off . Last seizure was 30+yrs ago, no recurrnece she she states that she will hold better after she sees a neurologist, to decide whether she should continue the medication or not.    Other Healthcare Providers Involved in Patient s Care: She was seen by a nephrologist during hospitalization.  she is supposed to do of follow-up check. she plans to schedule      Update since discharge: improved. . Has mild shakes, still feeling somewhat weak  and not as steady yet , although no ligtheded ness, dizziness etc.       stil has some HA on and off, they are still there mostly in the back of her head, somewhat better as compared to the hospital. OTC tylnol does help. No associated nause vomiting, vision problems etc. She has history of migrain but does not  feel like her migraine headache         she lives alone feeling comfortable daughter  like a mile away     Post Discharge Medication Reconciliation: discharge medications reconciled, continue medications without change.  Plan of care communicated with patient     Coding guidelines for this visit:  Type of Medical   Decision Making Face-to-Face  Visit       within 7 Days of discharge Face-to-Face Visit        within 14 days of discharge   Moderate Complexity 38012 [id number]   High Complexity 66761 [id number]              PROBLEMS TO ADD ON...    Headache    Hypertension Follow-up      Outpatient blood pressures are being checked at home.  Results are 130/80 at home last night .    Low Salt Diet: no added salt      Problem list and histories reviewed & adjusted, as indicated.  Additional history: as documented    Patient Active Problem List   Diagnosis     HYPERLIPIDEMIA LDL GOAL <130     Advance care planning     BPPV (benign paroxysmal positional vertigo)     Migraine headache without aura     Hypertension goal BP (blood pressure) < 140/90     Seizure disorder (H)     Osteoporosis     Chronic idiopathic constipation     Hyponatremia     Past Surgical History:   Procedure Laterality Date     APPENDECTOMY  2007     CL AFF SURGICAL PATHOLOGY  1970    with cryotherapy     COLONOSCOPY  2005    polyp excision, repeat  5 years      COLONOSCOPY N/A 9/15/2016    3 adenoma polyps, repeat in 3 years      D & C       HYSTEROSCOPY       TUBAL LIGATION         Social History   Substance Use Topics     Smoking status: Former Smoker     Years: 18.00     Types: Cigarettes     Quit date: 1989     Smokeless tobacco: Never Used     Alcohol use 1.8 oz/week     3 Standard drinks or equivalent per week      Comment: 2/week     Family History   Problem Relation Age of Onset     Cardiovascular Paternal Grandfather       of heart attack     Hypertension Father      Lipids Father      CANCER Father      MELANOMA     Genitourinary Problems Mother      CANCER Paternal Grandmother      Asthma Sister      Asthma Sister            Reviewed and updated as needed this visit by clinical staff       Reviewed and updated as needed this visit by Provider         ROS:  Constitutional, HEENT, cardiovascular, pulmonary, GI, , musculoskeletal, neuro, skin, endocrine and psych systems are  "negative, except as otherwise noted.    OBJECTIVE:     /88  Pulse 91  Temp 97.5  F (36.4  C) (Tympanic)  Ht 5' 6\" (1.676 m)  Wt 146 lb (66.2 kg)  SpO2 99%  BMI 23.57 kg/m2  Body mass index is 23.57 kg/(m^2).  GENERAL: healthy, alert and no distress  EYES: Eyes grossly normal to inspection, PERRL and conjunctivae and sclerae normal  HENT: ear canals and TM's normal, nose and mouth without ulcers or lesions  NECK: no adenopathy, no asymmetry, masses, or scars and thyroid normal to palpation  RESP: lungs clear to auscultation - no rales, rhonchi or wheezes  CV: regular rate and rhythm, normal S1 S2, no S3 or S4,  ABDOMEN: soft, nontender, no hepatosplenomegaly, no masses and bowel sounds normal  MS: no gross musculoskeletal defects noted, no edema  SKIN: no suspicious lesions or rashes  NEURO: Normal strength and tone, mentation intact and speech normal  PSYCH: mentation appears normal, affect normal         ASSESSMENT/PLAN:         (Z09) Hospital discharge follow-up  (primary encounter diagnosis)  Comment: Related to recent influenza/UTI/dehydration led to hyponatremia  Plan:     (E87.1) Hyponatremia  Comment: Related to recent illness and dehydration, it was thought to be possible medication effect.  Plan: Comprehensive metabolic panel, Magnesium,         Phosphorus        Clinically she is improving.  Check labs.  She also has a follow-up scheduled with nephrology.follow.  As needed    (I10) Hypertension goal BP (blood pressure) < 140/90  Comment: Medications were changed in the hospital  Plan: Improved and stable on current med's.  need to continue to monitor.  follow-up in 3-4 weeks sooner if any problem    (G40.909) Seizure disorder (H)  Comment:   Plan: NEUROLOGY ADULT REFERRAL        Has no previous history of seizure disorder, although last seizure was 30+ years ago.  Her medication was stopped during this recent hospitalization.  She is doing well but just worried wish to go back to see the " neurologist referral was given.     (G44.89) Other headache syndrome  Comment:   Plan: Having some headaches since the recent hospitalization.  She is improved and stable.  OTC Tylenol does help.  Does not think it feels like a migraine headache.  If her headaches do continue to be a problem she will discuss that with the neurology as well.  Follow-up here as needed.      Check labs. refill sent.Cares and  treatment discussed follow. up if problem   Patient expressed understanding and agreement with treatment plan. All patient's questions were answered, will let me know if has more later.  Medications: Rx's: Reviewed the potential side effects/complications of medications prescribed.       Bri House MD  [facility]

## 2018-02-26 NOTE — TELEPHONE ENCOUNTER
"  ED for acute condition Discharge Protocol    \"Hi, my name is Tanvi MOTTShahab Rosario, a registered nurse, and I am calling from [facility].  I am calling to follow up and see how things are going for you after your recent emergency visit.\"    Tell me how you are doing now that you are home?\" Patient states that she is doing well.  Patient reports that she has not checked BP at home.  Will try to get it re calibrated.  Denies Symptoms of hypertension.        Discharge Instructions    \"Let's review your discharge instructions.  What is/are the follow-up recommendations?  Pt. Response: yes had appointment that was set up for her did not know much about it    \"Has an appointment with your primary care provider been scheduled?\"  Yes. (confirm and remind to bring meds)    Medications    \"Tell me what changed about your medicines when you discharged?\"    Started the amlodipine and Metoprolol    \"What questions do you have about your medications?\"   None        Call Summary    \"What questions or concerns do you have about your recent visit and your follow-up care?\"     none    \"If you have questions or things don't continue to improve, we encourage you contact us through the main clinic number (give number).  Even if the clinic is not open, triage nurses are available 24/7 to help you.     We would like you to know that our clinic has extended hours (provide information).  We also have urgent care (provide details on closest location and hours/contact info)\"    \"Thank you for your time and take care!\"                "

## 2018-02-27 LAB
ALBUMIN SERPL-MCNC: 4.1 G/DL (ref 3.4–5)
ALP SERPL-CCNC: 84 U/L (ref 40–150)
ALT SERPL W P-5'-P-CCNC: 35 U/L (ref 0–50)
ANION GAP SERPL CALCULATED.3IONS-SCNC: 11 MMOL/L (ref 3–14)
AST SERPL W P-5'-P-CCNC: 27 U/L (ref 0–45)
BILIRUB SERPL-MCNC: 0.5 MG/DL (ref 0.2–1.3)
BUN SERPL-MCNC: 9 MG/DL (ref 7–30)
CALCIUM SERPL-MCNC: 9.6 MG/DL (ref 8.5–10.1)
CHLORIDE SERPL-SCNC: 97 MMOL/L (ref 94–109)
CO2 SERPL-SCNC: 22 MMOL/L (ref 20–32)
CREAT SERPL-MCNC: 0.6 MG/DL (ref 0.52–1.04)
GFR SERPL CREATININE-BSD FRML MDRD: >90 ML/MIN/1.7M2
GLUCOSE SERPL-MCNC: 108 MG/DL (ref 70–99)
MAGNESIUM SERPL-MCNC: 2 MG/DL (ref 1.6–2.3)
PHOSPHATE SERPL-MCNC: 3.3 MG/DL (ref 2.5–4.5)
POTASSIUM SERPL-SCNC: 3.9 MMOL/L (ref 3.4–5.3)
PROT SERPL-MCNC: 7.5 G/DL (ref 6.8–8.8)
SODIUM SERPL-SCNC: 130 MMOL/L (ref 133–144)

## 2018-04-06 ENCOUNTER — TRANSFERRED RECORDS (OUTPATIENT)
Dept: HEALTH INFORMATION MANAGEMENT | Facility: CLINIC | Age: 63
End: 2018-04-06

## 2018-04-10 ENCOUNTER — OFFICE VISIT (OUTPATIENT)
Dept: FAMILY MEDICINE | Facility: CLINIC | Age: 63
End: 2018-04-10
Payer: COMMERCIAL

## 2018-04-10 VITALS
RESPIRATION RATE: 14 BRPM | BODY MASS INDEX: 23.63 KG/M2 | HEIGHT: 66 IN | SYSTOLIC BLOOD PRESSURE: 127 MMHG | DIASTOLIC BLOOD PRESSURE: 89 MMHG | OXYGEN SATURATION: 98 % | HEART RATE: 71 BPM | TEMPERATURE: 97.6 F | WEIGHT: 147 LBS

## 2018-04-10 DIAGNOSIS — J40 BRONCHITIS: ICD-10-CM

## 2018-04-10 DIAGNOSIS — J02.9 SORE THROAT: Primary | ICD-10-CM

## 2018-04-10 LAB
DEPRECATED S PYO AG THROAT QL EIA: NORMAL
SPECIMEN SOURCE: NORMAL

## 2018-04-10 PROCEDURE — 87880 STREP A ASSAY W/OPTIC: CPT | Performed by: FAMILY MEDICINE

## 2018-04-10 PROCEDURE — 87081 CULTURE SCREEN ONLY: CPT | Performed by: FAMILY MEDICINE

## 2018-04-10 PROCEDURE — 99213 OFFICE O/P EST LOW 20 MIN: CPT | Performed by: FAMILY MEDICINE

## 2018-04-10 RX ORDER — AZITHROMYCIN 250 MG/1
TABLET, FILM COATED ORAL
Qty: 6 TABLET | Refills: 0 | Status: SHIPPED | OUTPATIENT
Start: 2018-04-10 | End: 2018-11-08

## 2018-04-10 NOTE — NURSING NOTE
"Chief Complaint   Patient presents with     Pharyngitis     URI       Initial /89  Pulse 71  Temp 97.6  F (36.4  C) (Tympanic)  Resp 14  Ht 5' 6\" (1.676 m)  Wt 147 lb (66.7 kg)  SpO2 98%  BMI 23.73 kg/m2 Estimated body mass index is 23.73 kg/(m^2) as calculated from the following:    Height as of this encounter: 5' 6\" (1.676 m).    Weight as of this encounter: 147 lb (66.7 kg).  Medication Reconciliation: complete  "

## 2018-04-10 NOTE — MR AVS SNAPSHOT
After Visit Summary   4/10/2018    Love Pinon    MRN: [id number]           Patient Information     Date Of Birth          1955        Visit Information        Provider Department      4/10/2018 5:00 PM [facility] [place]        Today's Diagnoses     Sore throat    -  1    Bronchitis          Care Instructions    Take medications as directed.  Treatment  and symptomatic cares discussed   Follow up if problem or concern             Follow-ups after your visit        Follow-up notes from your care team     Return if symptoms worsen or fail to improve, for Physical Exam.      Who to contact     If you have questions or need follow up information about today's clinic visit or your schedule please contact [facility] [place] directly at [phone].  Normal or non-critical lab and imaging results will be communicated to you by MyChart, letter or phone within 4 business days after the clinic has received the results. If you do not hear from us within 7 days, please contact the clinic through MyChart or phone. If you have a critical or abnormal lab result, we will notify you by phone as soon as possible.  Submit refill requests through [employer] or call your pharmacy and they will forward the refill request to us. Please allow 3 business days for your refill to be completed.          Additional Information About Your Visit        MyChart Information     [employer] gives you secure access to your electronic health record. If you see a primary care provider, you can also send messages to your care team and make appointments. If you have questions, please call your primary care clinic.  If you do not have a primary care provider, please call [phone] and they will assist you.        Care EveryWhere ID     This is your Care EveryWhere ID. This could be used by other organizations to access your [place] medical records  [id number]        Your Vitals Were   "   Pulse Temperature Respirations Height Pulse Oximetry BMI (Body Mass Index)    71 97.6  F (36.4  C) (Tympanic) 14 5' 6\" (1.676 m) 98% 23.73 kg/m2       Blood Pressure from Last 3 Encounters:   04/10/18 127/89   02/26/18 127/88   02/25/18 (!) 157/104    Weight from Last 3 Encounters:   04/10/18 147 lb (66.7 kg)   02/26/18 146 lb (66.2 kg)   02/25/18 148 lb 5.9 oz (67.3 kg)              We Performed the Following     Beta strep group A culture     Strep, Rapid Screen          Today's Medication Changes          These changes are accurate as of 4/10/18  5:39 PM.  If you have any questions, ask your nurse or doctor.               Start taking these medicines.        Dose/Directions    azithromycin 250 MG tablet   Commonly known as:  ZITHROMAX   Used for:  Bronchitis   Started by:  Bri House MD        Two tablets first day, then one tablet daily for four days.   Quantity:  6 tablet   Refills:  0         These medicines have changed or have updated prescriptions.        Dose/Directions    metoprolol succinate 50 MG 24 hr tablet   Commonly known as:  TOPROL-XL   This may have changed:  how much to take   Used for:  Hypertension goal BP (blood pressure) < 140/90        Dose:  50 mg   Take 1 tablet (50 mg) by mouth daily   Quantity:  30 tablet   Refills:  0       simvastatin 40 MG tablet   Commonly known as:  ZOCOR   This may have changed:  how much to take   Used for:  Hyperlipidemia LDL goal <130        Dose:  40 mg   Take 1 tablet (40 mg) by mouth At Bedtime   Quantity:  90 tablet   Refills:  3       topiramate 25 MG tablet   Commonly known as:  TOPAMAX   This may have changed:  how much to take   Used for:  Intractable migraine without aura and without status migrainosus        Dose:  25 mg   Take 1 tablet (25 mg) by mouth 2 times daily   Quantity:  180 tablet   Refills:  3            Where to get your medicines      These medications were sent to [id number] - [place] - [address] [facility] [address]  [address]  [address], [place] [address]     Phone:  [phone]     azithromycin 250 MG tablet                Primary Care Provider Office Phone # Fax #    Bri Roldan House [phone]       [address]  [place] [address]        Equal Access to Services     RINKU LOMAX AH: Hadii aad ku hadasho Soomaali, waaxda luqadaha, qaybta kaalmada adeegyada, waxay idiin hayaan adeeg kharash la'aan ah. So [facility] [phone].    ATENCIÓN: Si logan montilla, tiene a amanda disposición servicios gratuitos de asistencia lingüística. [facility] [phone].    We comply with applicable federal civil rights laws and [place] laws. We do not discriminate on the basis of race, color, national origin, age, disability, sex, sexual orientation, or gender identity.            Thank you!     Thank you for choosing [facility] [place]  for your care. Our goal is always to provide you with excellent care. Hearing back from our patients is one way we can continue to improve our services. Please take a few minutes to complete the written survey that you may receive in the mail after your visit with us. Thank you!             Your Updated Medication List - Protect others around you: Learn how to safely use, store and throw away your medicines at www.disposemymeds.org.          This list is accurate as of 4/10/18  5:39 PM.  Always use your most recent med list.                   Brand Name Dispense Instructions for use Diagnosis    azithromycin 250 MG tablet    ZITHROMAX    6 tablet    Two tablets first day, then one tablet daily for four days.    Bronchitis       calcium + D 600-200 MG-UNIT Tabs   Generic drug:  calcium carbonate-vitamin D     60 tablet    Take 1 tablet by mouth daily        Cranberry 500 MG Caps      Take  by mouth. Daily        fish oil-omega-3 fatty acids 1000 MG capsule      Twice daily        KLS ALLER-JUWAN 10 MG tablet    Generic drug:  cetirizine      Take 10 mg by mouth daily.        metoprolol succinate 50 MG 24 hr tablet    TOPROL-XL    30 tablet    Take 1 tablet (50 mg) by mouth daily    Hypertension goal BP (blood pressure) < 140/90       senna-docusate 8.6-50 MG per tablet    SENOKOT-S;PERICOLACE    28 tablet    Take 1 tablet by mouth daily        simvastatin 40 MG tablet    ZOCOR    90 tablet    Take 1 tablet (40 mg) by mouth At Bedtime    Hyperlipidemia LDL goal <130       topiramate 25 MG tablet    TOPAMAX    180 tablet    Take 1 tablet (25 mg) by mouth 2 times daily    Intractable migraine without aura and without status migrainosus

## 2018-04-10 NOTE — PROGRESS NOTES
"  SUBJECTIVE:   Love Pinon is a 63 year old female who presents to clinic today for the following health issues:      Acute Illness   Acute illness concerns: Sore Throat   Onset: x Saturday     Fever: no     Chills/Sweats: no     Headache (location?): no     Sinus Pressure:YES    Conjunctivitis:  no    Ear Pain: YES-     Rhinorrhea: YES    Congestion: YES    Sore Throat: YES    \"Whooping deep cough\"     Cough: YES-productive  , deep cough     Wheeze: no but chest feels heavy affecting sleep     Decreased Appetite: no     Nausea: no     Vomiting: no     Diarrhea:  no     Dysuria/Freq.: no     Fatigue/Achiness: no     Sick/Strep Exposure: YES- daughter same sx      Therapies Tried and outcome: tylenol this am           Problem list and histories reviewed & adjusted, as indicated.  Additional history: as documented    Patient Active Problem List   Diagnosis     HYPERLIPIDEMIA LDL GOAL <130     Advance care planning     BPPV (benign paroxysmal positional vertigo)     Migraine headache without aura     Hypertension goal BP (blood pressure) < 140/90     Seizure disorder (H)     Osteoporosis     Chronic idiopathic constipation     Hyponatremia     Past Surgical History:   Procedure Laterality Date     APPENDECTOMY       [facility]  1970    with cryotherapy     COLONOSCOPY  2005    polyp excision, repeat  5 years      COLONOSCOPY N/A 9/15/2016    3 adenoma polyps, repeat in 3 years      D & C       HYSTEROSCOPY       TUBAL LIGATION         Social History   Substance Use Topics     Smoking status: Former Smoker     Years: 18.00     Types: Cigarettes     Quit date: 1989     Smokeless tobacco: Never Used     Alcohol use 1.8 oz/week     3 Standard drinks or equivalent per week      Comment: 2/week     Family History   Problem Relation Age of Onset     Cardiovascular Paternal Grandfather       of heart attack     Hypertension Father      Lipids Father      CANCER Father      MELANOMA     " "Genitourinary Problems Mother      CANCER Paternal Grandmother      Asthma Sister      Asthma Sister            Reviewed and updated as needed this visit by clinical staff       Reviewed and updated as needed this visit by Provider         ROS:  Constitutional, HEENT, cardiovascular, pulmonary, GI, , musculoskeletal, neuro, skin, endocrine and psych systems are negative, except as otherwise noted.    OBJECTIVE:     /89  Pulse 71  Temp 97.6  F (36.4  C) (Tympanic)  Resp 14  Ht 5' 6\" (1.676 m)  Wt 147 lb (66.7 kg)  SpO2 98%  BMI 23.73 kg/m2  Body mass index is 23.73 kg/(m^2).  GENERAL: healthy, alert and no distress  EYES: Eyes grossly normal to inspection, PERRL and conjunctivae and sclerae normal  HENT: ear canals and TM's normal and oral mucous membranes moist, Throat with mild pharyngeal erythema, no sinus  tenderness  NECK: no adenopathy, no asymmetry,   RESP: lungs clear to auscultation - no rales, rhonchi or wheezes  CV: regular rate and rhythm, normal S1 S2, no S3 or S4, no murmur,     Diagnostic Test Results:  Strep screen - Negative    ASSESSMENT/PLAN:         (J02.9) Sore throat  (primary encounter diagnosis)  Comment:   Plan: Strep, Rapid Screen, Beta strep group A culture          Rapid strep negative, strep culture pending. Call  only if positive.       (J40) Bronchitis  Comment:   Plan: azithromycin (ZITHROMAX) 250 MG tablet              URI lingering onto bronchitis. Treat with z pack.  .  Cares and symptomatic treatment discussed follow up if problem         Patient expressed understanding and agreement with treatment plan. All patient's questions were answered, will let me know if has more later.  Medications: Rx's: Reviewed the potential side effects/complications of medications prescribed.       Bri House MD  [facility] [place] [facility]    "

## 2018-04-11 LAB
BACTERIA SPEC CULT: NORMAL
SPECIMEN SOURCE: NORMAL

## 2018-04-17 ENCOUNTER — TELEPHONE (OUTPATIENT)
Dept: FAMILY MEDICINE | Facility: CLINIC | Age: 63
End: 2018-04-17

## 2018-04-17 NOTE — TELEPHONE ENCOUNTER
Patient calling, states she was treated for bronchitis on 4/10/18 with a zpak and reports since finishing the medication she now has sinus symptoms like green mucous, headache, facial pain. She is afebrile. Advised that since she is fever free and has symptoms for less than 10 days she should start with OTC mucinex (advised to avoid decongestant due to htn), flonase and sinus irrigations as well as OTC pain relievers. Advised to call back if fever develops or symptoms progress past 10 days. Patient/ parent verbalized understanding and agrees with plan.    Dayami Joshua RN   [facility] - Triage

## 2018-06-06 ENCOUNTER — MYC REFILL (OUTPATIENT)
Dept: FAMILY MEDICINE | Facility: CLINIC | Age: 63
End: 2018-06-06

## 2018-06-06 DIAGNOSIS — E78.5 HYPERLIPIDEMIA LDL GOAL <130: ICD-10-CM

## 2018-06-06 RX ORDER — SIMVASTATIN 40 MG
40 TABLET ORAL AT BEDTIME
Qty: 90 TABLET | Refills: 3 | Status: SHIPPED | OUTPATIENT
Start: 2018-06-06 | End: 2019-05-20

## 2018-06-06 NOTE — TELEPHONE ENCOUNTER
Message from [employer]:  Original authorizing provider: BARRY Richter CNP would like a refill of the following medications:  simvastatin (ZOCOR) 40 MG tablet [BARRY Richter CNP]    Preferred pharmacy: [id number] - [place], [place] - [address] AT [facility] OF [facility] [address]    Comment:  Could I ask you to notify [facility] [place] on [address]. My Rx has  and has no refills remaining. I have also changed my pharmacy. Thanks.

## 2018-06-06 NOTE — TELEPHONE ENCOUNTER
Routing refill request to provider for review/approval because:  Labs not current:  Lipids  Tanvi Hammond RN - Triage  [facility]

## 2018-06-12 ENCOUNTER — TRANSFERRED RECORDS (OUTPATIENT)
Dept: HEALTH INFORMATION MANAGEMENT | Facility: CLINIC | Age: 63
End: 2018-06-12

## 2018-08-14 ENCOUNTER — TELEPHONE (OUTPATIENT)
Dept: FAMILY MEDICINE | Facility: CLINIC | Age: 63
End: 2018-08-14

## 2018-08-15 ENCOUNTER — HOSPITAL ENCOUNTER (OUTPATIENT)
Dept: MAMMOGRAPHY | Facility: CLINIC | Age: 63
Discharge: HOME | End: 2018-08-15
Admitting: FAMILY MEDICINE
Payer: COMMERCIAL

## 2018-08-15 DIAGNOSIS — Z12.31 SCREENING MAMMOGRAM, ENCOUNTER FOR: ICD-10-CM

## 2018-08-15 PROCEDURE — 77067 SCR MAMMO BI INCL CAD: CPT

## 2018-09-25 ENCOUNTER — ALLIED HEALTH/NURSE VISIT (OUTPATIENT)
Dept: NURSING | Facility: CLINIC | Age: 63
End: 2018-09-25
Payer: COMMERCIAL

## 2018-09-25 DIAGNOSIS — Z23 NEED FOR PROPHYLACTIC VACCINATION AND INOCULATION AGAINST INFLUENZA: Primary | ICD-10-CM

## 2018-09-25 PROCEDURE — 99207 ZZC NO CHARGE NURSE ONLY: CPT

## 2018-09-25 PROCEDURE — 90686 IIV4 VACC NO PRSV 0.5 ML IM: CPT

## 2018-09-25 PROCEDURE — 90471 IMMUNIZATION ADMIN: CPT

## 2018-09-25 NOTE — PROGRESS NOTES

## 2018-11-08 ENCOUNTER — OFFICE VISIT (OUTPATIENT)
Dept: FAMILY MEDICINE | Facility: CLINIC | Age: 63
End: 2018-11-08
Payer: COMMERCIAL

## 2018-11-08 VITALS
DIASTOLIC BLOOD PRESSURE: 70 MMHG | HEIGHT: 66 IN | BODY MASS INDEX: 25.39 KG/M2 | WEIGHT: 158 LBS | OXYGEN SATURATION: 100 % | SYSTOLIC BLOOD PRESSURE: 130 MMHG | TEMPERATURE: 98.5 F | HEART RATE: 64 BPM

## 2018-11-08 DIAGNOSIS — M25.512 LEFT SHOULDER PAIN, UNSPECIFIED CHRONICITY: ICD-10-CM

## 2018-11-08 DIAGNOSIS — R53.83 OTHER FATIGUE: ICD-10-CM

## 2018-11-08 DIAGNOSIS — M79.10 MYALGIA: ICD-10-CM

## 2018-11-08 DIAGNOSIS — M25.512 LEFT SHOULDER PAIN, UNSPECIFIED CHRONICITY: Primary | ICD-10-CM

## 2018-11-08 LAB
ERYTHROCYTE [DISTWIDTH] IN BLOOD BY AUTOMATED COUNT: 12 % (ref 10–15)
HCT VFR BLD AUTO: 40.4 % (ref 35–47)
HGB BLD-MCNC: 13.3 G/DL (ref 11.7–15.7)
MCH RBC QN AUTO: 33 PG (ref 26.5–33)
MCHC RBC AUTO-ENTMCNC: 32.9 G/DL (ref 31.5–36.5)
MCV RBC AUTO: 100 FL (ref 78–100)
PLATELET # BLD AUTO: 296 10E9/L (ref 150–450)
RBC # BLD AUTO: 4.03 10E12/L (ref 3.8–5.2)
WBC # BLD AUTO: 6 10E9/L (ref 4–11)

## 2018-11-08 PROCEDURE — 82306 VITAMIN D 25 HYDROXY: CPT | Performed by: FAMILY MEDICINE

## 2018-11-08 PROCEDURE — 85027 COMPLETE CBC AUTOMATED: CPT | Performed by: FAMILY MEDICINE

## 2018-11-08 PROCEDURE — 99214 OFFICE O/P EST MOD 30 MIN: CPT | Performed by: FAMILY MEDICINE

## 2018-11-08 PROCEDURE — 83735 ASSAY OF MAGNESIUM: CPT | Performed by: FAMILY MEDICINE

## 2018-11-08 PROCEDURE — 36415 COLL VENOUS BLD VENIPUNCTURE: CPT | Performed by: FAMILY MEDICINE

## 2018-11-08 PROCEDURE — 82550 ASSAY OF CK (CPK): CPT | Performed by: FAMILY MEDICINE

## 2018-11-08 PROCEDURE — 80053 COMPREHEN METABOLIC PANEL: CPT | Performed by: FAMILY MEDICINE

## 2018-11-08 RX ORDER — NABUMETONE 500 MG/1
500-1000 TABLET, FILM COATED ORAL 2 TIMES DAILY PRN
Qty: 20 TABLET | Refills: 0 | Status: SHIPPED | OUTPATIENT
Start: 2018-11-08 | End: 2018-11-20

## 2018-11-08 RX ORDER — NABUMETONE 500 MG/1
TABLET, FILM COATED ORAL
Qty: 360 TABLET | Refills: 0 | OUTPATIENT
Start: 2018-11-08

## 2018-11-08 NOTE — MR AVS SNAPSHOT
After Visit Summary   11/8/2018    Love Pinon    MRN: [id number]           Patient Information     Date Of Birth          1955        Visit Information        Provider Department      11/8/2018 2:40 PM Bri House MD [facility] [place]        Today's Diagnoses     Left shoulder pain, unspecified chronicity    -  1    Myalgia        Other fatigue          Care Instructions    Take medications as directed.  Treatment  and symptomatic cares discussed   Follow up if problem or concern             Follow-ups after your visit        Follow-up notes from your care team     Return in about 2 weeks (around 11/22/2018), or sooner if problem , for Physical Exam.      Who to contact     If you have questions or need follow up information about today's clinic visit or your schedule please contact [facility] [place] directly at [phone].  Normal or non-critical lab and imaging results will be communicated to you by MyChart, letter or phone within 4 business days after the clinic has received the results. If you do not hear from us within 7 days, please contact the clinic through MyChart or phone. If you have a critical or abnormal lab result, we will notify you by phone as soon as possible.  Submit refill requests through [employer] or call your pharmacy and they will forward the refill request to us. Please allow 3 business days for your refill to be completed.          Additional Information About Your Visit        MyChart Information     [employer] gives you secure access to your electronic health record. If you see a primary care provider, you can also send messages to your care team and make appointments. If you have questions, please call your primary care clinic.  If you do not have a primary care provider, please call [phone] and they will assist you.        Care EveryWhere ID     This is your Care EveryWhere ID. This could be used by other organizations to  "access your [place] medical records  [id number]        Your Vitals Were     Pulse Temperature Height Pulse Oximetry BMI (Body Mass Index)       64 98.5  F (36.9  C) (Tympanic) 5' 6\" (1.676 m) 100% 25.5 kg/m2        Blood Pressure from Last 3 Encounters:   11/08/18 (!) 141/93   04/10/18 127/89   02/26/18 127/88    Weight from Last 3 Encounters:   11/08/18 158 lb (71.7 kg)   04/10/18 147 lb (66.7 kg)   02/26/18 146 lb (66.2 kg)              We Performed the Following     CBC with platelets     CK total     Comprehensive metabolic panel     Magnesium     Vitamin D Deficiency          Today's Medication Changes          These changes are accurate as of 11/8/18  3:16 PM.  If you have any questions, ask your nurse or doctor.               Start taking these medicines.        Dose/Directions    nabumetone 500 MG tablet   Commonly known as:  RELAFEN   Used for:  Left shoulder pain, unspecified chronicity   Started by:  Bri House MD        Dose:  500-1000 mg   Take 1-2 tablets (500-1,000 mg) by mouth 2 times daily as needed for moderate pain   Quantity:  20 tablet   Refills:  0         These medicines have changed or have updated prescriptions.        Dose/Directions    metoprolol succinate 50 MG 24 hr tablet   Commonly known as:  TOPROL-XL   This may have changed:  how much to take   Used for:  Hypertension goal BP (blood pressure) < 140/90        Dose:  50 mg   Take 1 tablet (50 mg) by mouth daily   Quantity:  30 tablet   Refills:  0       topiramate 25 MG tablet   Commonly known as:  TOPAMAX   This may have changed:  how much to take   Used for:  Intractable migraine without aura and without status migrainosus        Dose:  25 mg   Take 1 tablet (25 mg) by mouth 2 times daily   Quantity:  180 tablet   Refills:  3            Where to get your medicines      [id number]  [address] - [address] AT [facility] OF  [address]  [address] [address] " [facility], [place] [address]     Phone:  [phone]     nabumetone 500 MG tablet                Primary Care Provider Office Phone # Fax #    Bri House [phone]       [address]  [place] [address]        Equal Access to Services     [facility]: Hadii aad ku hadasho Soomaali, waaxda luqadaha, qaybta kaalmada adeegyada, waxay idiin hayaan adeeg kharash la'aan . So [facility] [phone].    ATENCIÓN: Si habla español, tiene a amanda disposición servicios gratuitos de asistencia lingüística. Llame al [phone].    We comply with applicable federal civil rights laws and [place] laws. We do not discriminate on the basis of race, color, national origin, age, disability, sex, sexual orientation, or gender identity.            Thank you!     Thank you for choosing [facility] [place]  for your care. Our goal is always to provide you with excellent care. Hearing back from our patients is one way we can continue to improve our services. Please take a few minutes to complete the written survey that you may receive in the mail after your visit with us. Thank you!             Your Updated Medication List - Protect others around you: Learn how to safely use, store and throw away your medicines at www.disposemymeds.org.          This list is accurate as of 11/8/18  3:16 PM.  Always use your most recent med list.                   Brand Name Dispense Instructions for use Diagnosis    calcium + D 600-200 MG-UNIT Tabs   Generic drug:  calcium carbonate-vitamin D     60 tablet    Take 1 tablet by mouth daily        Cranberry 500 MG Caps      Take  by mouth. Daily        fish oil-omega-3 fatty acids 1000 MG capsule      Twice daily        KLS ALLER-JUWAN 10 MG tablet   Generic drug:  cetirizine      Take 10 mg by mouth daily.        metoprolol succinate 50 MG 24 hr tablet    TOPROL-XL    30 tablet    Take 1 tablet (50 mg) by mouth daily    Hypertension goal BP (blood  pressure) < 140/90       nabumetone 500 MG tablet    RELAFEN    20 tablet    Take 1-2 tablets (500-1,000 mg) by mouth 2 times daily as needed for moderate pain    Left shoulder pain, unspecified chronicity       senna-docusate 8.6-50 MG per tablet    SENOKOT-S;PERICOLACE    28 tablet    Take 1 tablet by mouth daily        simvastatin 40 MG tablet    ZOCOR    90 tablet    Take 1 tablet (40 mg) by mouth At Bedtime    Hyperlipidemia LDL goal <130       topiramate 25 MG tablet    TOPAMAX    180 tablet    Take 1 tablet (25 mg) by mouth 2 times daily    Intractable migraine without aura and without status migrainosus

## 2018-11-09 LAB
ALBUMIN SERPL-MCNC: 4 G/DL (ref 3.4–5)
ALP SERPL-CCNC: 88 U/L (ref 40–150)
ALT SERPL W P-5'-P-CCNC: 30 U/L (ref 0–50)
ANION GAP SERPL CALCULATED.3IONS-SCNC: 8 MMOL/L (ref 3–14)
AST SERPL W P-5'-P-CCNC: 24 U/L (ref 0–45)
BILIRUB SERPL-MCNC: 0.4 MG/DL (ref 0.2–1.3)
BUN SERPL-MCNC: 18 MG/DL (ref 7–30)
CALCIUM SERPL-MCNC: 9.4 MG/DL (ref 8.5–10.1)
CHLORIDE SERPL-SCNC: 109 MMOL/L (ref 94–109)
CK SERPL-CCNC: 95 U/L (ref 30–225)
CO2 SERPL-SCNC: 25 MMOL/L (ref 20–32)
CREAT SERPL-MCNC: 1.05 MG/DL (ref 0.52–1.04)
DEPRECATED CALCIDIOL+CALCIFEROL SERPL-MC: 39 UG/L (ref 20–75)
GFR SERPL CREATININE-BSD FRML MDRD: 53 ML/MIN/1.7M2
GLUCOSE SERPL-MCNC: 98 MG/DL (ref 70–99)
MAGNESIUM SERPL-MCNC: 2.2 MG/DL (ref 1.6–2.3)
POTASSIUM SERPL-SCNC: 4.4 MMOL/L (ref 3.4–5.3)
PROT SERPL-MCNC: 7.6 G/DL (ref 6.8–8.8)
SODIUM SERPL-SCNC: 142 MMOL/L (ref 133–144)

## 2018-11-20 ENCOUNTER — OFFICE VISIT (OUTPATIENT)
Dept: FAMILY MEDICINE | Facility: CLINIC | Age: 63
End: 2018-11-20
Payer: COMMERCIAL

## 2018-11-20 ENCOUNTER — RADIANT APPOINTMENT (OUTPATIENT)
Dept: GENERAL RADIOLOGY | Facility: CLINIC | Age: 63
End: 2018-11-20
Attending: FAMILY MEDICINE
Payer: COMMERCIAL

## 2018-11-20 VITALS
SYSTOLIC BLOOD PRESSURE: 138 MMHG | OXYGEN SATURATION: 100 % | WEIGHT: 157 LBS | HEIGHT: 66 IN | DIASTOLIC BLOOD PRESSURE: 80 MMHG | HEART RATE: 64 BPM | TEMPERATURE: 97.1 F | BODY MASS INDEX: 25.23 KG/M2

## 2018-11-20 DIAGNOSIS — Z23 NEED FOR VACCINATION: ICD-10-CM

## 2018-11-20 DIAGNOSIS — Z13.9 SCREENING FOR CONDITION: ICD-10-CM

## 2018-11-20 DIAGNOSIS — I10 HYPERTENSION GOAL BP (BLOOD PRESSURE) < 140/90: ICD-10-CM

## 2018-11-20 DIAGNOSIS — Z00.00 ROUTINE GENERAL MEDICAL EXAMINATION AT A HEALTH CARE FACILITY: Primary | ICD-10-CM

## 2018-11-20 DIAGNOSIS — M25.512 LEFT SHOULDER PAIN, UNSPECIFIED CHRONICITY: ICD-10-CM

## 2018-11-20 DIAGNOSIS — R79.89 ELEVATED SERUM CREATININE: ICD-10-CM

## 2018-11-20 PROCEDURE — 73030 X-RAY EXAM OF SHOULDER: CPT | Mod: LT

## 2018-11-20 PROCEDURE — 90471 IMMUNIZATION ADMIN: CPT | Performed by: FAMILY MEDICINE

## 2018-11-20 PROCEDURE — 99213 OFFICE O/P EST LOW 20 MIN: CPT | Mod: 25 | Performed by: FAMILY MEDICINE

## 2018-11-20 PROCEDURE — 99396 PREV VISIT EST AGE 40-64: CPT | Mod: 25 | Performed by: FAMILY MEDICINE

## 2018-11-20 PROCEDURE — 80048 BASIC METABOLIC PNL TOTAL CA: CPT | Performed by: FAMILY MEDICINE

## 2018-11-20 PROCEDURE — 80061 LIPID PANEL: CPT | Performed by: FAMILY MEDICINE

## 2018-11-20 PROCEDURE — 90715 TDAP VACCINE 7 YRS/> IM: CPT | Performed by: FAMILY MEDICINE

## 2018-11-20 PROCEDURE — 36415 COLL VENOUS BLD VENIPUNCTURE: CPT | Performed by: FAMILY MEDICINE

## 2018-11-20 PROCEDURE — 87389 HIV-1 AG W/HIV-1&-2 AB AG IA: CPT | Performed by: FAMILY MEDICINE

## 2018-11-20 RX ORDER — METOPROLOL SUCCINATE 50 MG/1
75 TABLET, EXTENDED RELEASE ORAL DAILY
Qty: 135 TABLET | Refills: 1 | Status: SHIPPED | OUTPATIENT
Start: 2018-11-20 | End: 2019-05-20

## 2018-11-20 NOTE — MR AVS SNAPSHOT
After Visit Summary   11/20/2018    Love Pinon    MRN: [id number]           Patient Information     Date Of Birth          1955        Visit Information        Provider Department      11/20/2018 10:40 AM Bri House MD [facility]        Today's Diagnoses     Routine general medical examination at a health care facility    -  1    Need for vaccination        Hypertension goal BP (blood pressure) < 140/90        Screening for condition        Left shoulder pain, unspecified chronicity        Elevated serum creatinine          Care Instructions      Prevention Guidelines, Women Ages 50 to 64  Screening tests and vaccines are an important part of managing your health. A screening test is done to find possible disorders or diseases in people who don't have any symptoms. The goal is to find a disease early so lifestyle changes can be made and you can be watched more closely to reduce the risk of disease, or to detect it early enough to treat it most effectively. Screening tests are not considered diagnostic, but are used to determine if more testing is needed. Health counseling is essential, too. Below are guidelines for these, for women ages 50 to 64. Talk with your healthcare provider to make sure you re up to date on what you need.  Screening Who needs it How often   Type 2 diabetes or prediabetes All women beginning at age 45 and women without symptoms at any age who are overweight or obese and have 1 or more additional risk factors for diabetes. At  least every 3 years   Type 2 diabetes or prediabetes All women diagnosed with gestational diabetes Lifelong testing every 3 years   Type 2 diabetes All women with prediabetes Every year   Alcohol misuse All women in this age group At routine exams   Blood pressure All women in this age group Yearly checkup if your blood pressure is normal  Normal blood pressure is less than 120/80 mm Hg  If your blood pressure  reading is higher than normal, follow the advice of your healthcare provider   Breast cancer All women at average risk in this age group Yearly mammogram should be done until age 54. At age 55, switch to mammograms every other year or choose to continue yearly mammograms.  All women should be familiar with the potential benefits and risks of breast cancer screening with mammograms.      Cervical cancer All women in this age group, except women who have had a complete hysterectomy Pap test every 3 years or Pap test with human papillomavirus (HPV) test every 5 years   Chlamydia Women at increased risk for infection At routine exams   Colorectal cancer All women in this age group Flexible sigmoidoscopy every 5 years, or colonoscopy every 10 years, or double-contrast barium enema every 5 years; yearly fecal occult blood test or fecal immunochemical test; or a stool DNA test as often as your health care provider advises; talk with your health care provider about which tests are best for you   Depression All women in this age group At routine exams   Gonorrhea Sexually active women at increased risk for infection At routine exams   Hepatitis C Anyone at increased risk; 1 time for those born between 1945 and 1965 At routine exams   High cholesterol or triglycerides All women in this age group who are at risk for coronary artery disease At least every 5 years   HIV All women At routine exams   Lung cancer Adults age 55 to 80 who have smoked Yearly screening in smokers with 30 pack-year history of smoking or who quit within 15 years   Obesity All women in this age group At routine exams   Osteoporosis Women who are postmenopausal Ask your healthcare provider   Syphilis Women at increased risk for infection - talk with your healthcare provider At routine exams   Tuberculosis Women at increased risk for infection - talk with your healthcare provider Ask your healthcare provider   Vision All women in this age group Ask your  healthcare provider   Vaccine Who needs it How often   Chickenpox (varicella) All women in this age group who have no record of this infection or vaccine 2 doses; the second dose should be given at least 4 weeks after the first dose   Hepatitis A Women at increased risk for infection - talk with your healthcare provider 2 doses given at least 6 months apart   Hepatitis B Women at increased risk for infection - talk with your healthcare provider 3 doses over 6 months; second dose should be given 1 month after the first dose; the third dose should be given at least 2 months after the second dose and at least 4 months after the first dose   Haemophilus influenzaeType B (HIB) Women at increased risk for infection - talk with your healthcare provider 1 to 3 doses   Influenza (flu) All women in this age group Once a year   Measles, mumps, rubella (MMR) Women in this age group through their late 50s who have no record of these infections or vaccines 1 dose   Meningococcal Women at increased risk for infection - talk with your healthcare provider 1 or more doses   Pneumococcal conjugate vaccine (PCV13) and pneumococcal polysaccharide vaccine (PPSV23) Women at increased risk for infection - talk with your healthcare provider PCV13: 1 dose ages 19 to 65 (protects against 13 types of pneumococcal bacteria)  PPSV23: 1 to 2 doses through age 64, or 1 dose at 65 or older (protects against 23 types of pneumococcal bacteria)   Tetanus/diphtheria/pertussis (Td/Tdap) booster All women in this age group Td every 10 years, or a one-time dose of Tdap instead of a Td booster after age 18, then Td every 10 years   Zoster All women ages 60 and older 1 dose   Counseling Who needs it How often   BRCA gene mutation testing for breast and ovarian cancer susceptibility Women with increased risk for having gene mutation When your risk is known   Breast cancer and chemoprevention Women at high risk for breast cancer When your risk is known   Diet  and exercise Women who are overweight or obese When diagnosed, and then at routine exams   Sexually transmitted infection prevention Women at increased risk for infection - talk with your healthcare provider At routine exams   Use of daily aspirin Women ages 55 and up in this age group who are at risk for cardiovascular health problems such as stroke When your risk is known   Use of tobacco and the health effects it can cause All women in this age group Every exam   1American Cancer Society  Date Last Reviewed: 1/26/2016 2000-2018 [employer]. [address] [place] [address]. All rights reserved. This information is not intended as a substitute for professional medical care. Always follow your healthcare professional's instructions.                Follow-ups after your visit        Additional Services     [facility], AND CHIR[facility]RACTIC REFERRAL       Physical Therapy, Hand Therapy and Chiropractic Care are available through:  *[facility]  *Hand Therapy (Occupational Therapy or Physical Therapy)  *[facility]    Call one number to schedule at any of the above locations: [phone].    Physical therapy, Hand therapy and/or Chiropractic care has been recommended by your physician as an excellent treatment option to reduce pain and help people return to normal activities, including sports.  Therapy and/or chiropractic care services are a great complement or alternative to expensive and invasive surgery, injections, or long-term use of prescription medications. The primary goal is to identify the underlying problem and provide you the tools to manage your condition on your own.     Please be aware that coverage of these services is subject to the terms and limitations of your health insurance plan.  Call member services at your health plan with any benefit or coverage questions.      Please bring the following to your appointment:  *Your personal  "calendar for scheduling future appointments  *Comfortable clothing                  Follow-up notes from your care team     Return in about 2 months (around 1/20/2019), or sooner if problem .      Future tests that were ordered for you today     Open Future Orders        Priority Expected Expires Ordered    [facility] REFERRAL Routine  11/20/2019 11/20/2018    XR Shoulder Left 2 Views Routine 11/20/2018 11/20/2019 11/20/2018            Who to contact     If you have questions or need follow up information about today's clinic visit or your schedule please contact [facility] [place] directly at [phone].  Normal or non-critical lab and imaging results will be communicated to you by reMailhart, letter or phone within 4 business days after the clinic has received the results. If you do not hear from us within 7 days, please contact the clinic through reMailhart or phone. If you have a critical or abnormal lab result, we will notify you by phone as soon as possible.  Submit refill requests through ISBX or call your pharmacy and they will forward the refill request to us. Please allow 3 business days for your refill to be completed.          Additional Information About Your Visit        reMailhar[facility]antara Information     ISBX gives you secure access to your electronic health record. If you see a primary care provider, you can also send messages to your care team and make appointments. If you have questions, please call your primary care clinic.  If you do not have a primary care provider, please call [phone] and they will assist you.        Care EveryWhere ID     This is your Care EveryWhere ID. This could be used by other organizations to access your [place] medical records  [id number]        Your Vitals Were     Pulse Temperature Height Pulse Oximetry BMI (Body Mass Index)       63 97.1  F (36.2  C) (Tympanic) 5' 6\" (1.676 m) 100% 25.34 kg/m2        Blood Pressure from Last 3 " Encounters:   11/20/18 138/80   11/08/18 130/70   04/10/18 127/89    Weight from Last 3 Encounters:   11/20/18 157 lb (71.2 kg)   11/08/18 158 lb (71.7 kg)   04/10/18 147 lb (66.7 kg)              We Performed the Following     1st  Administration  [34428]     Basic metabolic panel  (Ca, Cl, CO2, Creat, Gluc, K, Na, BUN)     HIV Antigen Antibody Combo     Lipid panel reflex to direct LDL Fasting     TDAP VACCINE (ADACEL) [14571.002]          Today's Medication Changes          These changes are accurate as of 11/20/18 11:10 AM.  If you have any questions, ask your nurse or doctor.               These medicines have changed or have updated prescriptions.        Dose/Directions    topiramate 25 MG tablet   Commonly known as:  TOPAMAX   This may have changed:  how much to take   Used for:  Intractable migraine without aura and without status migrainosus        Dose:  25 mg   Take 1 tablet (25 mg) by mouth 2 times daily   Quantity:  180 tablet   Refills:  3            Where to get your medicines      These medications were sent to [facility] [address] - [place] - [address] AT [facility] [address] [facility]  [address] [facility], [place] [address]     Phone:  [phone]     metoprolol succinate 50 MG 24 hr tablet                Primary Care Provider Office Phone # Fax #    Bri Roldan [facility] [phone]       [address]  [place] [address]        Equal Access to Services     [facility] Hospital[facility] AH: Hadii nagi long hadasho Soomaali, waaxda luqadaha, qaybta kaalmada adeegyajulio, waxay idijose avila. So [facility] [phone].    ATENCIÓN: Si habla español, tiene a amanda disposición servicios gratuitos de asistencia lingüística. Llame al [phone].    We comply with applicable federal civil rights laws and [place] laws. We do not discriminate on the basis of race, color, national origin, age, disability, sex, sexual orientation, or gender  identity.            Thank you!     Thank you for choosing [facility] [place]  for your care. Our goal is always to provide you with excellent care. Hearing back from our patients is one way we can continue to improve our services. Please take a few minutes to complete the written survey that you may receive in the mail after your visit with us. Thank you!             Your Updated Medication List - Protect others around you: Learn how to safely use, store and throw away your medicines at www.disposemymeds.org.          This list is accurate as of 11/20/18 11:10 AM.  Always use your most recent med list.                   Brand Name Dispense Instructions for use Diagnosis    calcium + D 600-200 MG-UNIT Tabs   Generic drug:  calcium carbonate-vitamin D     60 tablet    Take 1 tablet by mouth daily        Cranberry 500 MG Caps      Take  by mouth. Daily        fish oil-omega-3 fatty acids 1000 MG capsule      Twice daily        KLS ALLER-JUWAN 10 MG tablet   Generic drug:  cetirizine      Take 10 mg by mouth daily.        metoprolol succinate 50 MG 24 hr tablet    TOPROL-XL    135 tablet    Take 1.5 tablets (75 mg) by mouth daily    Hypertension goal BP (blood pressure) < 140/90       senna-docusate 8.6-50 MG per tablet    SENOKOT-S;PERICOLACE    28 tablet    Take 1 tablet by mouth daily        simvastatin 40 MG tablet    ZOCOR    90 tablet    Take 1 tablet (40 mg) by mouth At Bedtime    Hyperlipidemia LDL goal <130       topiramate 25 MG tablet    TOPAMAX    180 tablet    Take 1 tablet (25 mg) by mouth 2 times daily    Intractable migraine without aura and without status migrainosus       VITAMIN D-3 PO

## 2018-11-20 NOTE — PATIENT INSTRUCTIONS
Prevention Guidelines, Women Ages 50 to 64  Screening tests and vaccines are an important part of managing your health. A screening test is done to find possible disorders or diseases in people who don't have any symptoms. The goal is to find a disease early so lifestyle changes can be made and you can be watched more closely to reduce the risk of disease, or to detect it early enough to treat it most effectively. Screening tests are not considered diagnostic, but are used to determine if more testing is needed. Health counseling is essential, too. Below are guidelines for these, for women ages 50 to 64. Talk with your healthcare provider to make sure you re up to date on what you need.  Screening Who needs it How often   Type 2 diabetes or prediabetes All women beginning at age 45 and women without symptoms at any age who are overweight or obese and have 1 or more additional risk factors for diabetes. At  least every 3 years   Type 2 diabetes or prediabetes All women diagnosed with gestational diabetes Lifelong testing every 3 years   Type 2 diabetes All women with prediabetes Every year   Alcohol misuse All women in this age group At routine exams   Blood pressure All women in this age group Yearly checkup if your blood pressure is normal  Normal blood pressure is less than 120/80 mm Hg  If your blood pressure reading is higher than normal, follow the advice of your healthcare provider   Breast cancer All women at average risk in this age group Yearly mammogram should be done until age 54. At age 55, switch to mammograms every other year or choose to continue yearly mammograms.  All women should be familiar with the potential benefits and risks of breast cancer screening with mammograms.      Cervical cancer All women in this age group, except women who have had a complete hysterectomy Pap test every 3 years or Pap test with human papillomavirus (HPV) test every 5 years   Chlamydia Women at increased risk for  infection At routine exams   Colorectal cancer All women in this age group Flexible sigmoidoscopy every 5 years, or colonoscopy every 10 years, or double-contrast barium enema every 5 years; yearly fecal occult blood test or fecal immunochemical test; or a stool DNA test as often as your health care provider advises; talk with your health care provider about which tests are best for you   Depression All women in this age group At routine exams   Gonorrhea Sexually active women at increased risk for infection At routine exams   Hepatitis C Anyone at increased risk; 1 time for those born between 1945 and 1965 At routine exams   High cholesterol or triglycerides All women in this age group who are at risk for coronary artery disease At least every 5 years   HIV All women At routine exams   Lung cancer Adults age 55 to 80 who have smoked Yearly screening in smokers with 30 pack-year history of smoking or who quit within 15 years   Obesity All women in this age group At routine exams   Osteoporosis Women who are postmenopausal Ask your healthcare provider   Syphilis Women at increased risk for infection   talk with your healthcare provider At routine exams   Tuberculosis Women at increased risk for infection   talk with your healthcare provider Ask your healthcare provider   Vision All women in this age group Ask your healthcare provider   Vaccine Who needs it How often   Chickenpox (varicella) All women in this age group who have no record of this infection or vaccine 2 doses; the second dose should be given at least 4 weeks after the first dose   Hepatitis A Women at increased risk for infection   talk with your healthcare provider 2 doses given at least 6 months apart   Hepatitis B Women at increased risk for infection   talk with your healthcare provider 3 doses over 6 months; second dose should be given 1 month after the first dose; the third dose should be given at least 2 months after the second dose and at least  4 months after the first dose   Haemophilus influenzaeType B (HIB) Women at increased risk for infection   talk with your healthcare provider 1 to 3 doses   Influenza (flu) All women in this age group Once a year   Measles, mumps, rubella (MMR) Women in this age group through their late 50s who have no record of these infections or vaccines 1 dose   Meningococcal Women at increased risk for infection   talk with your healthcare provider 1 or more doses   Pneumococcal conjugate vaccine (PCV13) and pneumococcal polysaccharide vaccine (PPSV23) Women at increased risk for infection   talk with your healthcare provider PCV13: 1 dose ages 19 to 65 (protects against 13 types of pneumococcal bacteria)  PPSV23: 1 to 2 doses through age 64, or 1 dose at 65 or older (protects against 23 types of pneumococcal bacteria)   Tetanus/diphtheria/pertussis (Td/Tdap) booster All women in this age group Td every 10 years, or a one-time dose of Tdap instead of a Td booster after age 18, then Td every 10 years   Zoster All women ages 60 and older 1 dose   Counseling Who needs it How often   BRCA gene mutation testing for breast and ovarian cancer susceptibility Women with increased risk for having gene mutation When your risk is known   Breast cancer and chemoprevention Women at high risk for breast cancer When your risk is known   Diet and exercise Women who are overweight or obese When diagnosed, and then at routine exams   Sexually transmitted infection prevention Women at increased risk for infection   talk with your healthcare provider At routine exams   Use of daily aspirin Women ages 55 and up in this age group who are at risk for cardiovascular health problems such as stroke When your risk is known   Use of tobacco and the health effects it can cause All women in this age group Every exam   1American Cancer Society  Date Last Reviewed: 1/26/2016 2000-2018 [employer]. [address] [place] [address]. All  rights reserved. This information is not intended as a substitute for professional medical care. Always follow your healthcare professional's instructions.

## 2018-11-20 NOTE — PROGRESS NOTES
SUBJECTIVE:   CC: Love Pinon is an 63 year old woman who presents for preventive health visit.     Physical   Annual:     Getting at least 3 servings of Calcium per day:  Yes    Bi-annual eye exam:  Yes    Dental care twice a year:  NO    Sleep apnea or symptoms of sleep apnea:  None    Diet:  Regular (no restrictions)    Frequency of exercise:  None    Taking medications regularly:  Yes    Medication side effects:  Muscle aches    Additional concerns today:  No           PROBLEMS TO ADD ON...    Noted Elevated serum creatinine  recently ,  so  Need a  f/u check . She feels well otherwise urinating normal and feels well   Shoulder  Pain,  Follow Up      Description:   Location of pain:  Left shoulder ongoing x 6 weeks.   Pain  med's not helping enough, so  concerned with ongoing pain, hurts to use left  Arm.   Character of pain: dull ache, intermittent and waxing and waning  Pain radiation:  Radiates down  the arm but above the elbow   Since last visit, pain is:  No improvement   New numbness or weakness in legs, not attributed to pain:  no     Intensity: Currently , moderate    History:   Pain interferes with job: YES, daily activities , affecting sleep as she sleeps on that side with arm up ,   Therapies tried without relief: heat and ice helps but NSAIDs not doing much   Therapies tried with relief: as above        Today's PHQ-2 Score:   PHQ-2 ( 1999 [employer]) 11/13/2018   Q1: Little interest or pleasure in doing things 1   Q2: Feeling down, depressed or hopeless 1   PHQ-2 Score 2   Q1: Little interest or pleasure in doing things Several days   Q2: Feeling down, depressed or hopeless Several days   PHQ-2 Score 2   Answers for HPI/ROS submitted by the patient on 11/13/2018   PHQ-2 Score: 2      Abuse: Current or Past(Physical, Sexual or Emotional)- No  Do you feel safe in your environment - Yes    Social History   Substance Use Topics     Smoking status: Former Smoker     Years: 18.00     Types: Cigarettes      Quit date: 1989     Smokeless tobacco: Never Used     Alcohol use 1.8 oz/week     3 Standard drinks or equivalent per week      Comment: 2/week     Alcohol Use 2018   If you drink alcohol do you typically have greater than 3 drinks per day OR greater than 7 drinks per week? No   No flowsheet data found.    Reviewed orders with patient.  Reviewed health maintenance and updated orders accordingly - Yes  Patient Active Problem List   Diagnosis     HYPERLIPIDEMIA LDL GOAL <130     Advance care planning     BPPV (benign paroxysmal positional vertigo)     Migraine headache without aura     Hypertension goal BP (blood pressure) < 140/90     Seizure disorder (H)     Osteoporosis     Chronic idiopathic constipation     Hyponatremia     Past Surgical History:   Procedure Laterality Date     APPENDECTOMY       [facility] PATH[facility]  1970    with cryotherapy     COLONOSCOPY  2005    polyp excision, repeat  5 years      COLONOSCOPY N/A 9/15/2016    3 adenoma polyps, repeat in 3 years      D & C       HYSTEROSCOPY       TUBAL LIGATION         Social History   Substance Use Topics     Smoking status: Former Smoker     Years: 18.00     Types: Cigarettes     Quit date: 1989     Smokeless tobacco: Never Used     Alcohol use 1.8 oz/week     3 Standard drinks or equivalent per week      Comment: 2/week     Family History   Problem Relation Age of Onset     Cardiovascular Paternal Grandfather       of heart attack     Hypertension Father      Lipids Father      Cancer Father      MELANOMA     Genitourinary Problems Mother      Cancer Paternal Grandmother      Asthma Sister      Asthma Sister            Patient over age 50, mutual decision to screen reflected in health maintenance.    Pertinent mammograms are reviewed under the imaging tab.  History of abnormal Pap smear: NO - age 30- 65 PAP every 3 years recommended  PAP / HPV 2016   PAP NIL NIL NIL     Reviewed and updated as  needed this visit by clinical staff         Reviewed and updated as needed this visit by Provider        Past Medical History:   Diagnosis Date     BPPV (benign paroxysmal positional vertigo) 10/26/2012     Herpes genitalia      Hyperlipidemia      Hypertension      Iron deficiency anemia secondary to blood loss (chronic)     menorrhagia     Migraine headache without aura      Seizure disorder (H)     one seizure-grandmal in 1989        Review of Systems  CONSTITUTIONAL: NEGATIVE for fever, chills, change in weight  INTEGUMENTARU/SKIN: NEGATIVE for worrisome rashes, moles or lesions  EYES: NEGATIVE for vision changes or irritation  ENT: NEGATIVE for ear, mouth and throat problems  RESP: NEGATIVE for significant cough or SOB  BREAST: NEGATIVE for masses, tenderness or discharge  CV: NEGATIVE for chest pain, palpitations or peripheral edema  GI: NEGATIVE for nausea, abdominal pain, heartburn, or change in bowel habits  : NEGATIVE for unusual urinary or vaginal symptoms. Periods are regular.  MUSCULOSKELETAL:as per HPI   NEURO: NEGATIVE for weakness, dizziness or paresthesias  ENDOCRINE: NEGATIVE for temperature intolerance, skin/hair changes  PSYCHIATRIC: NEGATIVE for changes in mood or affect     OBJECTIVE:   There were no vitals taken for this visit.  Physical Exam  GENERAL: healthy, alert and no distress  EYES: Eyes grossly normal to inspection, PERRL and conjunctivae and sclerae normal  HENT: ear canals and TM's normal, nose and mouth without ulcers or lesions  NECK: no adenopathy, no asymmetry, masses, or scars and thyroid normal to palpation  RESP: lungs clear to auscultation - no rales, rhonchi or wheezes  BREAST: normal without masses, tenderness or nipple discharge and no palpable axillary masses or adenopathy  CV: regular rate and rhythm, normal S1 S2, no S3 or S4,  no peripheral edema   ABDOMEN: soft, nontender, no hepatosplenomegaly, no masses and bowel sounds normal   (female): deferred  RECTAL:  deferred  MS: no gross musculoskeletal defects noted, no edema, left shoulder with decrease ROM and tender to palpation across deltoid and anterior shoulder area , also mildly tender across trapezius   SKIN: no suspicious lesions or rashes  NEURO: Normal strength and tone, mentation intact and speech normal  PSYCH: mentation appears normal, affect normal/bright        ASSESSMENT/PLAN:   (Z00.00) Routine general medical examination at a health care facility  (primary encounter diagnosis)  Comment:   Plan:     (Z23) Need for vaccination  Comment:   Plan: TDAP VACCINE (ADACEL) [10191.002], 1st          Administration  [45188]            (I10) Hypertension goal BP (blood pressure) < 140/90  Comment:   Plan: Lipid panel reflex to direct LDL Fasting,         metoprolol succinate (TOPROL-XL) 50 MG 24 hr         tablet            (Z13.9) Screening for condition  Comment:   Plan: HIV Antigen Antibody Combo            (M25.512) Left shoulder pain, unspecified chronicity  Comment:   Plan: XR Shoulder Left 2 Views           discussed cares and symptomatic treatment including  adequate pain control, heat,  stretches etc. Get xray. Willing to try physical therapy       she will do follow up if no improvement or problem after PT . Consider further evaluation per ortho   if needed.       (R79.89) Elevated serum creatinine  Comment: recent increase, need f/u check   Plan: Basic metabolic panel  (Ca, Cl, CO2, Creat,         Gluc, K, Na, BUN)            Check labs. refill sent.Cares and  treatment discussed follow.up if problem   Patient expressed understanding and agreement with treatment plan. All patient's questions were answered, will let me know if has more later.  Medications: Rx's: Reviewed the potential side effects/complications of medications prescribed.     COUNSELING:  Reviewed preventive health counseling, as reflected in patient instructions       Regular exercise       Healthy diet/nutrition       Vision screening        "Hearing screening       Immunizations    Vaccinated for: TDAP             Osteoporosis Prevention/Bone Health       Colon cancer screening       HIV screeninx in teen years, 1x in adult years, and at intervals if high risk    BP Readings from Last 1 Encounters:   18 130/70     Estimated body mass index is 25.5 kg/(m^2) as calculated from the following:    Height as of 18: 5' 6\" (1.676 m).    Weight as of 18: 158 lb (71.7 kg).           reports that she quit smoking about 28 years ago. Her smoking use included Cigarettes. She quit after 18.00 years of use. She has never used smokeless tobacco.      Counseling Resources:  ATP IV Guidelines  Pooled Cohorts Equation Calculator  Breast Cancer Risk Calculator  FRAX Risk Assessment  ICSI Preventive Guidelines  Dietary Guidelines for Americans, 2010  USDA's MyPlate  ASA Prophylaxis  Lung CA Screening    Bri House MD  [facility]  "

## 2018-11-21 LAB
ANION GAP SERPL CALCULATED.3IONS-SCNC: 9 MMOL/L (ref 3–14)
BUN SERPL-MCNC: 17 MG/DL (ref 7–30)
CALCIUM SERPL-MCNC: 9.3 MG/DL (ref 8.5–10.1)
CHLORIDE SERPL-SCNC: 106 MMOL/L (ref 94–109)
CHOLEST SERPL-MCNC: 246 MG/DL
CO2 SERPL-SCNC: 23 MMOL/L (ref 20–32)
CREAT SERPL-MCNC: 0.88 MG/DL (ref 0.52–1.04)
GFR SERPL CREATININE-BSD FRML MDRD: 65 ML/MIN/1.7M2
GLUCOSE SERPL-MCNC: 84 MG/DL (ref 70–99)
HDLC SERPL-MCNC: 92 MG/DL
HIV 1+2 AB+HIV1 P24 AG SERPL QL IA: NONREACTIVE
LDLC SERPL CALC-MCNC: 134 MG/DL
NONHDLC SERPL-MCNC: 154 MG/DL
POTASSIUM SERPL-SCNC: 4 MMOL/L (ref 3.4–5.3)
SODIUM SERPL-SCNC: 138 MMOL/L (ref 133–144)
TRIGL SERPL-MCNC: 99 MG/DL

## 2018-11-26 ENCOUNTER — THERAPY VISIT (OUTPATIENT)
Dept: PHYSICAL THERAPY | Facility: CLINIC | Age: 63
End: 2018-11-26
Payer: COMMERCIAL

## 2018-11-26 DIAGNOSIS — M25.512 LEFT SHOULDER PAIN, UNSPECIFIED CHRONICITY: ICD-10-CM

## 2018-11-26 PROCEDURE — 97161 PT EVAL LOW COMPLEX 20 MIN: CPT | Mod: GP | Performed by: PHYSICAL THERAPIST

## 2018-11-26 PROCEDURE — 97110 THERAPEUTIC EXERCISES: CPT | Mod: GP | Performed by: PHYSICAL THERAPIST

## 2018-11-26 NOTE — MR AVS SNAPSHOT
After Visit Summary   11/26/2018    Love Pinon    MRN: [id number]           Patient Information     Date Of Birth          1955        Visit Information        Provider Department      11/26/2018 10:40 AM Terence Guaman, PT [facility]        Today's Diagnoses     Left shoulder pain, unspecified chronicity           Follow-ups after your visit        Your next 10 appointments already scheduled     Dec 03, 2018 10:00 AM CST   [facility]AY Extremity with Terence Guaman PT   [facility] ([facility] [place])    [address] [place] [address]  [address]  [place] [address]   [phone]            Dec 10, 2018 10:00 AM CST   MCKAY Extremity with Terence Guaman PT   [facility] ([facility] [place])    [address] [place] [address]  [address]  [place] [address] [phone]              Who to contact     If you have questions or need follow up information about today's clinic visit or your schedule please contact [facility] directly at [phone].  Normal or non-critical lab and imaging results will be communicated to you by Fitlyhart, letter or phone within 4 business days after the clinic has received the results. If you do not hear from us within 7 days, please contact the clinic through Fitlyhart or phone. If you have a critical or abnormal lab result, we will notify you by phone as soon as possible.  Submit refill requests through Alector or call your pharmacy and they will forward the refill request to us. Please allow 3 business days for your refill to be completed.          Additional Information About Your Visit        [facility] Information     Alector gives you secure access to your electronic health record. If you see a primary care provider, you can also send messages to your care team and make  appointments. If you have questions, please call your primary care clinic.  If you do not have a primary care provider, please call [phone] and they will assist you.        Care EveryWhere ID     This is your Care EveryWhere ID. This could be used by other organizations to access your [place] medical records  [id number]         Blood Pressure from Last 3 Encounters:   11/20/18 138/80   11/08/18 130/70   04/10/18 127/89    Weight from Last 3 Encounters:   11/20/18 71.2 kg (157 lb)   11/08/18 71.7 kg (158 lb)   04/10/18 66.7 kg (147 lb)              We Performed the Following     [facility] PT EVAL, LOW COMPLEXITY     [facility]AY INITIAL EVAL REPORT     [facility] PT, HAND, AND CHIROPRACTIC REFERRAL     THERAPEUTIC EXERCISES          Today's Medication Changes          These changes are accurate as of 11/26/18 11:28 AM.  If you have any questions, ask your nurse or doctor.               These medicines have changed or have updated prescriptions.        Dose/Directions    topiramate 25 MG tablet   Commonly known as:  TOPAMAX   This may have changed:  how much to take   Used for:  Intractable migraine without aura and without status migrainosus        Dose:  25 mg   Take 1 tablet (25 mg) by mouth 2 times daily   Quantity:  180 tablet   Refills:  3                Primary Care Provider Office Phone # Fax #    Bri Roldan House [phone]       [address] [place] [place] [address]        Equal Access to Services     [facility] Center[facility] AH: Hadii nagi ku hadasho Somariali, waaxda luqadaha, qaybta kaalmada adeegyada, emma avila. So [facility] [phone].    ATENCIÓN: Si habla español, tiene a amanda disposición servicios gratuitos de asistencia lingüística. Llame al [phone].    We comply with applicable federal civil rights laws and [place] laws. We do not discriminate on the basis of race, color, national origin, age, disability, sex, sexual orientation, or gender identity.            Thank  you!     Thank you for choosing [facility]  for your care. Our goal is always to provide you with excellent care. Hearing back from our patients is one way we can continue to improve our services. Please take a few minutes to complete the written survey that you may receive in the mail after your visit with us. Thank you!             Your Updated Medication List - Protect others around you: Learn how to safely use, store and throw away your medicines at www.disposemymeds.org.          This list is accurate as of 11/26/18 11:28 AM.  Always use your most recent med list.                   Brand Name Dispense Instructions for use Diagnosis    calcium + D 600-200 MG-UNIT Tabs   Generic drug:  calcium carbonate-vitamin D     60 tablet    Take 1 tablet by mouth daily        Cranberry 500 MG Caps      Take  by mouth. Daily        fish oil-omega-3 fatty acids 1000 MG capsule      Twice daily        KLS ALLER-JUWAN 10 MG tablet   Generic drug:  cetirizine      Take 10 mg by mouth daily.        metoprolol succinate 50 MG 24 hr tablet    TOPROL-XL    135 tablet    Take 1.5 tablets (75 mg) by mouth daily    Hypertension goal BP (blood pressure) < 140/90       senna-docusate 8.6-50 MG per tablet    SENOKOT-S;PERICOLACE    28 tablet    Take 1 tablet by mouth daily        simvastatin 40 MG tablet    ZOCOR    90 tablet    Take 1 tablet (40 mg) by mouth At Bedtime    Hyperlipidemia LDL goal <130       topiramate 25 MG tablet    TOPAMAX    180 tablet    Take 1 tablet (25 mg) by mouth 2 times daily    Intractable migraine without aura and without status migrainosus       VITAMIN D-3 PO

## 2018-11-26 NOTE — PROGRESS NOTES
[facility] Initial Evaluation  Subjective:  Patient is a 63 year old female presenting with rehab left shoulder hpi.   Love Pinon is a 63 year old female with a left shoulder condition.  Condition occurred with:  Repetition/overuse.  Condition occurred: in the community and for unknown reasons.  This is a new condition  Patient is referred with a 6 week hx of L shoulder pain. She notes some radiation to right upper arm and upper trap. Pain is constant at this time and worse with reaching overhead or behind the back. She thinks it may be related to pulling and pushing heavy objects at work..    Patient reports pain:  Anterior, lateral, posterior and in the joint.  Radiates to:  Shoulder and upper arm.  Pain is described as aching and is constant and reported as 8/10.  Associated symptoms:  Painful arc and loss of motion/stiffness. Pain is the same all the time.  Symptoms are exacerbated by using arm at shoulder level, using arm behind back and using arm overhead and relieved by nothing.  Since onset symptoms are unchanged.  Special tests:  X-ray.      General health as reported by patient is good.  Pertinent medical history includes:  Migraines/headaches, seizures and high blood pressure.      Current medications:  High blood pressure medication and anti-seizure medication.    Patient is working in normal job without restrictions.  Primary job tasks include:  Repetitive tasks (push/pull).    Barriers include:  None as reported by the patient.    Red flags:  None as reported by the patient.                        Objective:  System                   Shoulder Evaluation:  ROM:  AROM:    Flexion:  Left:  138    Right:  160    Abduction:  Left: 135   Right:  165                      PROM:  normal                                Strength:  normal                      Stability Testing:  normal      Special Tests:    Left shoulder positive for the following special tests:   Impingement    Palpation:  not assessed      Mobility Tests:  normal                                                 General     ROS    Assessment/Plan:    Patient is a 63 year old female with left side shoulder complaints.    Patient has the following significant findings with corresponding treatment plan.                Diagnosis 1:  Left shoulder pain  Pain -  hot/cold therapy, manual therapy, self management, education and home program  Decreased ROM/flexibility - manual therapy, therapeutic exercise and home program  Impaired muscle performance - neuro re-education and home program  Decreased function - therapeutic activities and home program    Therapy Evaluation Codes:   1) History comprised of:   Personal factors that impact the plan of care:      None.    Comorbidity factors that impact the plan of care are:      None.     Medications impacting care: None.  2) Examination of Body Systems comprised of:   Body structures and functions that impact the plan of care:      Shoulder.   Activity limitations that impact the plan of care are:      Dressing and Lifting.  3) Clinical presentation characteristics are:   Stable/Uncomplicated.  4) Decision-Making    Low complexity using standardized patient assessment instrument and/or measureable assessment of functional outcome.  Cumulative Therapy Evaluation is: Low complexity.    Previous and current functional limitations:  (See Goal Flow Sheet for this information)    Short term and Long term goals: (See Goal Flow Sheet for this information)     Communication ability:  Patient appears to be able to clearly communicate and understand verbal and written communication and follow directions correctly.  Treatment Explanation - The following has been discussed with the patient:   RX ordered/plan of care  Anticipated outcomes  Possible risks and side effects  This patient would benefit from PT intervention to resume normal activities.   Rehab potential is  excellent.    Frequency:  1 X week, once daily  Duration:  for 4 weeks  Discharge Plan:  Achieve all LTG.  Independent in home treatment program.  Reach maximal therapeutic benefit.    Please refer to the daily flowsheet for treatment today, total treatment time and time spent performing 1:1 timed codes.

## 2018-12-03 ENCOUNTER — THERAPY VISIT (OUTPATIENT)
Dept: PHYSICAL THERAPY | Facility: CLINIC | Age: 63
End: 2018-12-03
Payer: COMMERCIAL

## 2018-12-03 DIAGNOSIS — M25.512 LEFT SHOULDER PAIN, UNSPECIFIED CHRONICITY: ICD-10-CM

## 2018-12-03 PROCEDURE — 97110 THERAPEUTIC EXERCISES: CPT | Mod: GP | Performed by: PHYSICAL THERAPIST

## 2018-12-10 ENCOUNTER — THERAPY VISIT (OUTPATIENT)
Dept: PHYSICAL THERAPY | Facility: CLINIC | Age: 63
End: 2018-12-10
Payer: COMMERCIAL

## 2018-12-10 DIAGNOSIS — M25.512 LEFT SHOULDER PAIN, UNSPECIFIED CHRONICITY: ICD-10-CM

## 2018-12-10 PROCEDURE — 97110 THERAPEUTIC EXERCISES: CPT | Mod: GP | Performed by: PHYSICAL THERAPIST

## 2018-12-28 ENCOUNTER — THERAPY VISIT (OUTPATIENT)
Dept: PHYSICAL THERAPY | Facility: CLINIC | Age: 63
End: 2018-12-28
Payer: COMMERCIAL

## 2018-12-28 DIAGNOSIS — M25.512 LEFT SHOULDER PAIN, UNSPECIFIED CHRONICITY: ICD-10-CM

## 2018-12-28 PROCEDURE — 97110 THERAPEUTIC EXERCISES: CPT | Mod: GP | Performed by: PHYSICAL THERAPIST

## 2018-12-28 PROCEDURE — 97140 MANUAL THERAPY 1/> REGIONS: CPT | Mod: GP | Performed by: PHYSICAL THERAPIST

## 2019-01-11 ENCOUNTER — THERAPY VISIT (OUTPATIENT)
Dept: PHYSICAL THERAPY | Facility: CLINIC | Age: 64
End: 2019-01-11
Payer: COMMERCIAL

## 2019-01-11 DIAGNOSIS — M25.512 LEFT SHOULDER PAIN, UNSPECIFIED CHRONICITY: ICD-10-CM

## 2019-01-11 PROCEDURE — 97140 MANUAL THERAPY 1/> REGIONS: CPT | Mod: GP | Performed by: PHYSICAL THERAPIST

## 2019-01-11 PROCEDURE — 97110 THERAPEUTIC EXERCISES: CPT | Mod: GP | Performed by: PHYSICAL THERAPIST

## 2019-02-20 ENCOUNTER — OFFICE VISIT (OUTPATIENT)
Dept: FAMILY MEDICINE | Facility: CLINIC | Age: 64
End: 2019-02-20
Payer: COMMERCIAL

## 2019-02-20 VITALS
HEIGHT: 66 IN | TEMPERATURE: 97.7 F | SYSTOLIC BLOOD PRESSURE: 130 MMHG | DIASTOLIC BLOOD PRESSURE: 82 MMHG | BODY MASS INDEX: 26.2 KG/M2 | WEIGHT: 163 LBS | HEART RATE: 69 BPM | OXYGEN SATURATION: 97 %

## 2019-02-20 DIAGNOSIS — R22.1 LUMP IN NECK: ICD-10-CM

## 2019-02-20 DIAGNOSIS — R63.5 WEIGHT GAIN: ICD-10-CM

## 2019-02-20 DIAGNOSIS — E78.5 HYPERLIPIDEMIA LDL GOAL <130: ICD-10-CM

## 2019-02-20 DIAGNOSIS — M25.512 LEFT SHOULDER PAIN, UNSPECIFIED CHRONICITY: ICD-10-CM

## 2019-02-20 DIAGNOSIS — Z12.39 SCREENING FOR BREAST CANCER: ICD-10-CM

## 2019-02-20 DIAGNOSIS — Z00.00 ROUTINE HISTORY AND PHYSICAL EXAMINATION OF ADULT: Primary | ICD-10-CM

## 2019-02-20 DIAGNOSIS — R22.1 LOCALIZED SWELLING, MASS AND LUMP, NECK: ICD-10-CM

## 2019-02-20 LAB
ALT SERPL W P-5'-P-CCNC: 31 U/L (ref 0–50)
AST SERPL W P-5'-P-CCNC: 25 U/L (ref 0–45)
CHOLEST SERPL-MCNC: 207 MG/DL
HDLC SERPL-MCNC: 83 MG/DL
LDLC SERPL CALC-MCNC: 99 MG/DL
NONHDLC SERPL-MCNC: 124 MG/DL
TRIGL SERPL-MCNC: 123 MG/DL
TSH SERPL DL<=0.005 MIU/L-ACNC: 1.92 MU/L (ref 0.4–4)

## 2019-02-20 PROCEDURE — 84450 TRANSFERASE (AST) (SGOT): CPT | Performed by: FAMILY MEDICINE

## 2019-02-20 PROCEDURE — 99213 OFFICE O/P EST LOW 20 MIN: CPT | Mod: 25 | Performed by: FAMILY MEDICINE

## 2019-02-20 PROCEDURE — 36415 COLL VENOUS BLD VENIPUNCTURE: CPT | Performed by: FAMILY MEDICINE

## 2019-02-20 PROCEDURE — 87624 HPV HI-RISK TYP POOLED RSLT: CPT | Performed by: FAMILY MEDICINE

## 2019-02-20 PROCEDURE — G0145 SCR C/V CYTO,THINLAYER,RESCR: HCPCS | Performed by: FAMILY MEDICINE

## 2019-02-20 PROCEDURE — 99396 PREV VISIT EST AGE 40-64: CPT | Performed by: FAMILY MEDICINE

## 2019-02-20 PROCEDURE — 80061 LIPID PANEL: CPT | Performed by: FAMILY MEDICINE

## 2019-02-20 PROCEDURE — 84460 ALANINE AMINO (ALT) (SGPT): CPT | Performed by: FAMILY MEDICINE

## 2019-02-20 PROCEDURE — 84443 ASSAY THYROID STIM HORMONE: CPT | Performed by: FAMILY MEDICINE

## 2019-02-20 RX ORDER — NABUMETONE 500 MG/1
500-1000 TABLET, FILM COATED ORAL 2 TIMES DAILY PRN
Qty: 30 TABLET | Refills: 0 | Status: SHIPPED | OUTPATIENT
Start: 2019-02-20 | End: 2019-12-19

## 2019-02-20 ASSESSMENT — MIFFLIN-ST. JEOR: SCORE: 1306.11

## 2019-02-20 NOTE — PROGRESS NOTES
SUBJECTIVE:   CC: Love Pinon is an 63 year old woman who presents for preventive health visit.     Healthy Habits:    Do you get at least three servings of calcium containing foods daily (dairy, green leafy vegetables, etc.)? yes    Amount of exercise or daily activities, outside of work: 0 day(s) per week    Problems taking medications regularly No    Medication side effects: No    Have you had an eye exam in the past two years? yes    Do you see a dentist twice per year? no    Do you have sleep apnea, excessive snoring or daytime drowsiness?no      PROBLEMS TO ADD ON...  Joint or Musculoskeletal Pain  Duration of complaint:  Ongoing for a while   Description:   Location: left shoulder  Character: Sharp and Dull ache  Intensity: moderate, severe  Progression of Symptoms: worse  Accompanying Signs & Symptoms: Other symptoms: radiation of pain to , tingling and redness  History: Previous similar pain: YES  Ongoing for several months   Precipitating factors: Trauma or overuse: YES, work was repetitious, although no recall of any injury   Alleviating factors: Improved by: NSAID - OTC pain ,PT   Therapies Tried and outcome: not  helping     Hyperlipidemia Follow-Up      Rate your low fat/cholesterol diet?: good    Taking statin?  Yes, no muscle aches from statin    Other lipid medications/supplements?:  none      Today's PHQ-2 Score:   PHQ-2 (  [employer]) 2018   Q1: Little interest or pleasure in doing things 0 1   Q2: Feeling down, depressed or hopeless 0 1   PHQ-2 Score 0 2   Q1: Little interest or pleasure in doing things - Several days   Q2: Feeling down, depressed or hopeless - Several days   PHQ-2 Score - 2       Abuse: Current or Past(Physical, Sexual or Emotional)- No  Do you feel safe in your environment? Yes    Social History     Tobacco Use     Smoking status: Former Smoker     Years: 18.00     Types: Cigarettes     Last attempt to quit: 1989     Years since quittin.1      Smokeless tobacco: Never Used   Substance Use Topics     Alcohol use: Yes     Alcohol/week: 1.8 oz     Types: 3 Standard drinks or equivalent per week     Comment: 2/week     If you drink alcohol do you typically have >3 drinks per day or >7 drinks per week? No                     Reviewed orders with patient.  Reviewed health maintenance and updated orders accordingly - Yes  Patient Active Problem List   Diagnosis     HYPERLIPIDEMIA LDL GOAL <130     Advance care planning     BPPV (benign paroxysmal positional vertigo)     Migraine headache without aura     Hypertension goal BP (blood pressure) < 140/90     Seizure disorder (H)     Osteoporosis     Chronic idiopathic constipation     Hyponatremia     Left shoulder pain, unspecified chronicity     Screening for breast cancer     Past Surgical History:   Procedure Laterality Date     APPENDECTOMY       CL AFF SURGICAL PATHOLOGY  1970    with cryotherapy     COLONOSCOPY  2005    polyp excision, repeat  5 years      COLONOSCOPY N/A 9/15/2016    3 adenoma polyps, repeat in 3 years      D & C       HYSTEROSCOPY       TUBAL LIGATION         Social History     Tobacco Use     Smoking status: Former Smoker     Years: 18.00     Types: Cigarettes     Last attempt to quit: 1989     Years since quittin.1     Smokeless tobacco: Never Used   Substance Use Topics     Alcohol use: Yes     Alcohol/week: 1.8 oz     Types: 3 Standard drinks or equivalent per week     Comment: 2/week     Family History   Problem Relation Age of Onset     Cardiovascular Paternal Grandfather          of heart attack     Hypertension Father      Lipids Father      Cancer Father         MELANOMA     Genitourinary Problems Mother      Cancer Paternal Grandmother      Asthma Sister      Asthma Sister            Mammogram Screening: Patient over age 50, mutual decision to screen reflected in health maintenance.    Pertinent mammograms are reviewed under the imaging tab.  History of abnormal  Pap smear: NO - age 30- 65 PAP every 3 years recommended  PAP / HPV 1/29/2016 9/13/2012 1/14/2011   PAP NIL NIL NIL     Reviewed and updated as needed this visit by clinical staff         Reviewed and updated as needed this visit by Provider        Past Medical History:   Diagnosis Date     BPPV (benign paroxysmal positional vertigo) 10/26/2012     Herpes genitalia      Hyperlipidemia      Hypertension      Iron deficiency anemia secondary to blood loss (chronic)     menorrhagia     Migraine headache without aura      Seizure disorder (H)     one seizure-grandmal in 1989        ROS:  CONSTITUTIONAL: NEGATIVE for fever, chills, change in weight  INTEGUMENTARY/SKIN: NEGATIVE for worrisome rashes, moles or lesions  EYES: NEGATIVE for vision changes or irritation  ENT: NEGATIVE for ear, mouth and throat problems  RESP: NEGATIVE for significant cough or SOB  BREAST: NEGATIVE for masses, tenderness or discharge  CV: NEGATIVE for chest pain, palpitations or peripheral edema  GI: NEGATIVE for nausea, abdominal pain, heartburn, or change in bowel habits  : NEGATIVE for unusual urinary or vaginal symptoms. No vaginal bleeding.  MUSCULOSKELETAL:NEGATIVE for significant arthralgias or myalgia, POSITIVE  for  arthralgias left shoulder as per HPI   NEURO: NEGATIVE for weakness, dizziness or paresthesias  ENDOCRINE: POSITIVE  for constipation, hair loss, weight gain and fatigue, cold intolerance and NEGATIVE for , heat intolerance and skin changes  PSYCHIATRIC: NEGATIVE for changes in mood or affect     OBJECTIVE:   There were no vitals taken for this visit.  EXAM:  GENERAL APPEARANCE: healthy, alert and no distress  EYES: Eyes grossly normal to inspection, PERRL and conjunctivae and sclerae normal  HENT: ear canals and TM's normal, nose and mouth without ulcers or lesions, oropharynx clear and oral mucous membranes moist  NECK: no adenopathy, no asymmetry, small mid line nodular lump slightly to the rt side possible thyroid  nodule, non tender  to palpation mobile  RESP: lungs clear to auscultation - no rales, rhonchi or wheezes  BREAST: normal without masses, tenderness or nipple discharge and no palpable axillary masses or adenopathy  CV: regular rate and rhythm, normal S1 S2, no S3 or S4, no murmur, click or rub, no peripheral edema and peripheral pulses strong  ABDOMEN: soft, nontender, no hepatosplenomegaly, no masses and bowel sounds normal   (female): normal female external genitalia, normal urethral meatus, vaginal mucosal mild atrophy noted and normal cervix, adnexae, and uterus without masses.  MS: LUE exam reveals ROM of left shoulder is decreased and has tenderness anterior  Shoulder,  as well as  trapezius and medial  scapular border   SKIN: no suspicious lesions or rashes  NEURO: Normal strength and tone, sensory exam grossly normal, mentation intact and speech normal  PSYCH: mentation appears normal and affect normal/bright        ASSESSMENT/PLAN:   (Z00.00) Routine history and physical examination of adult  (primary encounter diagnosis)  Comment:   Plan: Pap imaged thin layer screen with HPV -         recommended age 30 - 65, HPV High Risk Types         DNA Cervical            (R63.5) Weight gain  Comment:   Plan: TSH with free T4 reflex            (E78.5) Hyperlipidemia LDL goal <130  Comment:   Plan: Lipid panel reflex to direct LDL Fasting, AST,         ALT            (Z12.31) Screening for breast cancer  Comment:   Plan: *MA Screening Digital Bilateral            (M25.512) Left shoulder pain, unspecified chronicity  Comment:   Plan: ORTHOPEDICS ADULT REFERRAL, nabumetone         (RELAFEN) 500 MG tablet           discussed  cares and symptomatic treatment including  adequate pain control, heat,  stretches etc.       Consider further evaluation per ortho bc of ongoing sx .       she will do follow up if  problem.    (R22.1) Lump in neck  Comment: feels like thyroid goiter  Plan: US Head Neck Soft Tissue         "    Check labs/ proceed with unit(s)/s.Cares and concerns  Discussed.  follow up if problem   Patient expressed understanding and agreement with treatment plan. All patient's questions were answered, will let me know if has more later.  Medications: Rx's: Reviewed the potential side effects/complications of medications prescribed.     COUNSELING:   Reviewed preventive health counseling, as reflected in patient instructions       Regular exercise       Healthy diet/nutrition       Vision screening       Hearing screening       Immunizations    Consider Vaccination  for: Zoster             Osteoporosis Prevention/Bone Health    BP Readings from Last 1 Encounters:   11/20/18 138/80     Estimated body mass index is 25.34 kg/m  as calculated from the following:    Height as of 11/20/18: 1.676 m (5' 6\").    Weight as of 11/20/18: 71.2 kg (157 lb).           reports that she quit smoking about 29 years ago. Her smoking use included cigarettes. She quit after 18.00 years of use. she has never used smokeless tobacco.      Counseling Resources:  ATP IV Guidelines  Pooled Cohorts Equation Calculator  Breast Cancer Risk Calculator  FRAX Risk Assessment  ICSI Preventive Guidelines  Dietary Guidelines for Americans, 2010  USDA's MyPlate  ASA Prophylaxis  Lung CA Screening    Bri House MD  [facility]  "

## 2019-02-21 NOTE — PROGRESS NOTES
[facility]   Clinic Visit s Feb 22, 2019    PCP: Bri House      Love is a 63 year old female who is seen in consultation at the request of Dr. House for   Chief Complaint   Patient presents with     Left Shoulder - Pain       Injury: Reports insidious onset without acute precipitating event. Came on after a flu shot. Prior to that, no pain.      Right hand dominant    Location of Pain: left shoulder posterior and lateral, nonradiating , progressively worse.   Duration of Pain: 6 month(s)  Rating of Pain at worst: 8/10  Rating of Pain Currently: 6/10  Pain is better with: activity avoidance, heat  Pain is worse with: self care and sleeping  Treatment so far consists of: physical therapy, ice   Associated symptoms: no distal numbness or tingling; denies swelling or warmth  Recent imaging completed: X-rays completed nov 2018.  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[employer], granite slabs      Past Medical History:   Diagnosis Date     BPPV (benign paroxysmal positional vertigo) 10/26/2012     Herpes genitalia      Hyperlipidemia      Hypertension      Iron deficiency anemia secondary to blood loss (chronic)     menorrhagia     Migraine headache without aura      Seizure disorder (H)     one seizure-grandmal in 1989       Patient Active Problem List    Diagnosis Date Noted     Screening for breast cancer 02/20/2019     Priority: Medium     Left shoulder pain, unspecified chronicity 11/26/2018     Priority: Medium     Hyponatremia 02/22/2018     Priority: Medium     Osteoporosis 02/22/2017     Priority: Medium     Chronic idiopathic constipation 02/22/2017     Priority: Medium     Hypertension goal BP (blood pressure) < 140/90 04/30/2014     Priority: Medium     Migraine headache without aura      Priority: Medium     Seizure disorder (H)      Priority: Medium     one seizure-grandmal in 1989       BPPV (benign paroxysmal positional  vertigo) 10/26/2012     Priority: Medium     Advance care planning 2011     Priority: Medium     Advance Care Planning 2016: Receipt of ACP document:  Received: Health Care Directive which was witnessed or notarized on 2013.  Document not previously scanned.  Validation form completed and sent with document to be scanned.  Code Status needs to be updated to reflect choices in most recent ACP document.  Confirmed/documented designated decision maker(s).  Added by Linda Delgado  Advance Care Planning 11:  Discussed advance care planning with patient; information given to patient to review. Sherrie Schmitt CMA            HYPERLIPIDEMIA LDL GOAL <130 10/31/2010     Priority: Medium       Family History   Problem Relation Age of Onset     Cardiovascular Paternal Grandfather          of heart attack     Hypertension Father      Lipids Father      Cancer Father         MELANOMA     Genitourinary Problems Mother      Cancer Paternal Grandmother      Asthma Sister      Asthma Sister        Social History     Socioeconomic History     Marital status: Single     Spouse name:      Number of children: 2     Years of education: Not on file     Highest education level: Not on file   Occupational History     Occupation: sales      Employer: [employer]     Comment: [employer]   Social Needs     Financial resource strain: Not on file     Food insecurity:     Worry: Not on file     Inability: Not on file     Transportation needs:     Medical: Not on file     Non-medical: Not on file   Tobacco Use     Smoking status: Former Smoker     Years: 18.00     Types: Cigarettes     Last attempt to quit: 1989     Years since quittin.1     Smokeless tobacco: Never Used   Substance and Sexual Activity     Alcohol use: Yes     Alcohol/week: 1.8 oz     Types: 3 Standard drinks or equivalent per week     Comment: 2/week       Past Surgical History:   Procedure Laterality Date      "APPENDECTOMY  2007     CL AFF SURGICAL PATHOLOGY  1970    with cryotherapy     COLONOSCOPY  2005    polyp excision, repeat  5 years      COLONOSCOPY N/A 9/15/2016    3 adenoma polyps, repeat in 3 years      D & C       HYSTEROSCOPY       TUBAL LIGATION             Review of Systems   Musculoskeletal: Positive for joint pain.   Neurological: Negative for tingling, sensory change and focal weakness.   All other systems reviewed and are negative.        Physical Exam  BP (!) 142/92   Ht 1.676 m (5' 6\")   Wt 73.9 kg (163 lb)   BMI 26.31 kg/m    Constitutional:well-developed, well-nourished, and in no distress.   Cardiovascular: Intact distal pulses.    Neurological: alert. Gait Normal:   Gait, station, stance, and balance appear normal for age  Skin: Skin is warm and dry.   Psychiatric: Mood and affect normal.   Respiratory: unlabored, speaks in full sentences  Lymph: no LAD, no lymphangitis            Left Shoulder Exam     Tenderness   The patient is experiencing no tenderness.     Range of Motion   Active abduction: normal   Passive abduction: 120   Extension: normal   External rotation: normal   Forward flexion: normal   Internal rotation 0 degrees: Sacrum     Muscle Strength   Abduction: 5/5   Internal rotation: 5/5   External rotation: 5/5   Supraspinatus: 5/5   Subscapularis: 5/5   Biceps: 5/5     Tests   Apprehension: negative  Nick test: positive  Cross arm: positive  Impingement: positive  Drop arm: negative  Sulcus: absent    Other   Erythema: absent  Sensation: normal  Pulse: present                  X-ray images Previously done and independently reviewed by me in the office today with the patient. X-ray shows:   Normal    LEFT SHOULDER THREE VIEWS  11/20/2018 12:11 PM      HISTORY: Ongoing shoulder pain, hurts with range of motion. Left  shoulder pain, unspecified chronicity.     COMPARISON: None.                                                                      IMPRESSION: Normal.      DAMION " MD MARIE    ASSESSMENT/PLAN    ICD-10-CM    1. Rotator cuff syndrome of left shoulder M75.102      Acute pain following a flu immunization last fall, however pain is more typical of a bursal pattern rather than a localized result of a shot.  Having been through extensive physical therapy, the next step may be a subacromial bursal cortisone injection.  Discussed options and patient eager to proceed.  If not improved after 2 or 3 weeks of resuming home exercises in addition to the shot, then an MRI would be indicated.  Patient comfortable with plan.    Large Joint Injection/Arthocentesis: L subacromial bursa  Date/Time: 2/22/2019 9:18 AM  Performed by: Rafi De Santiago MD  Authorized by: Rafi De Santiago MD     Indications:  Pain  Needle Size:  25 G  Guidance: landmark guided    Approach:  Posterolateral  Location:  Shoulder  Site:  L subacromial bursa  Medications:  4 mL lidocaine 1 %; 5 mL lidocaine 1 %; 40 mg triamcinolone 40 MG/ML  Outcome:  Tolerated well, no immediate complications  Procedure discussed: discussed risks, benefits, and alternatives    Timeout: timeout called immediately prior to procedure    Prep: patient was prepped and draped in usual sterile fashion     Lot: [id number] EXP: 05/2020

## 2019-02-22 ENCOUNTER — OFFICE VISIT (OUTPATIENT)
Dept: ORTHOPEDICS | Facility: CLINIC | Age: 64
End: 2019-02-22
Payer: COMMERCIAL

## 2019-02-22 VITALS
HEIGHT: 66 IN | DIASTOLIC BLOOD PRESSURE: 92 MMHG | BODY MASS INDEX: 26.2 KG/M2 | WEIGHT: 163 LBS | SYSTOLIC BLOOD PRESSURE: 142 MMHG

## 2019-02-22 DIAGNOSIS — M75.102 ROTATOR CUFF SYNDROME OF LEFT SHOULDER: Primary | ICD-10-CM

## 2019-02-22 PROCEDURE — 20610 DRAIN/INJ JOINT/BURSA W/O US: CPT | Mod: LT | Performed by: FAMILY MEDICINE

## 2019-02-22 PROCEDURE — 99203 OFFICE O/P NEW LOW 30 MIN: CPT | Mod: 25 | Performed by: FAMILY MEDICINE

## 2019-02-22 RX ORDER — TRIAMCINOLONE ACETONIDE 40 MG/ML
40 INJECTION, SUSPENSION INTRA-ARTICULAR; INTRAMUSCULAR
Status: DISCONTINUED | OUTPATIENT
Start: 2019-02-22 | End: 2019-05-07

## 2019-02-22 RX ORDER — LIDOCAINE HYDROCHLORIDE 10 MG/ML
4 INJECTION, SOLUTION INFILTRATION; PERINEURAL
Status: DISCONTINUED | OUTPATIENT
Start: 2019-02-22 | End: 2020-07-07

## 2019-02-22 RX ORDER — LIDOCAINE HYDROCHLORIDE 10 MG/ML
5 INJECTION, SOLUTION INFILTRATION; PERINEURAL
Status: DISCONTINUED | OUTPATIENT
Start: 2019-02-22 | End: 2020-07-07

## 2019-02-22 RX ADMIN — LIDOCAINE HYDROCHLORIDE 5 ML: 10 INJECTION, SOLUTION INFILTRATION; PERINEURAL at 09:18

## 2019-02-22 RX ADMIN — TRIAMCINOLONE ACETONIDE 40 MG: 40 INJECTION, SUSPENSION INTRA-ARTICULAR; INTRAMUSCULAR at 09:18

## 2019-02-22 RX ADMIN — LIDOCAINE HYDROCHLORIDE 4 ML: 10 INJECTION, SOLUTION INFILTRATION; PERINEURAL at 09:18

## 2019-02-22 ASSESSMENT — MIFFLIN-ST. JEOR: SCORE: 1306.11

## 2019-02-22 ASSESSMENT — ENCOUNTER SYMPTOMS
FOCAL WEAKNESS: 0
SENSORY CHANGE: 0
TINGLING: 0

## 2019-02-22 NOTE — LETTER
2/22/2019         RE: Love Pinon  [address] [address] [facility] [address]        Dear Colleague,    Thank you for referring your patient, Love Pinon, to the [facility]. Please see a copy of my visit note below.    HPI     [facility]   Clinic Visit s Feb 22, 2019    PCP: Bri House      Love is a 63 year old female who is seen in consultation at the request of Dr. House for   Chief Complaint   Patient presents with     Left Shoulder - Pain       Injury: Reports insidious onset without acute precipitating event. Came on after a flu shot. Prior to that, no pain.      Right hand dominant    Location of Pain: left shoulder posterior and lateral, nonradiating , progressively worse.   Duration of Pain: 6 month(s)  Rating of Pain at worst: 8/10  Rating of Pain Currently: 6/10  Pain is better with: activity avoidance, heat  Pain is worse with: self care and sleeping  Treatment so far consists of: physical therapy, ice   Associated symptoms: no distal numbness or tingling; denies swelling or warmth  Recent imaging completed: X-rays completed nov 2018.  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[employer], granite slabs      Past Medical History:   Diagnosis Date     BPPV (benign paroxysmal positional vertigo) 10/26/2012     Herpes genitalia      Hyperlipidemia      Hypertension      Iron deficiency anemia secondary to blood loss (chronic)     menorrhagia     Migraine headache without aura      Seizure disorder (H)     one seizure-grandmal in 1989       Patient Active Problem List    Diagnosis Date Noted     Screening for breast cancer 02/20/2019     Priority: Medium     Left shoulder pain, unspecified chronicity 11/26/2018     Priority: Medium     Hyponatremia 02/22/2018     Priority: Medium     Osteoporosis 02/22/2017     Priority: Medium     Chronic idiopathic constipation 02/22/2017      Priority: Medium     Hypertension goal BP (blood pressure) < 140/90 2014     Priority: Medium     Migraine headache without aura      Priority: Medium     Seizure disorder (H)      Priority: Medium     one seizure-grandmal in        BPPV (benign paroxysmal positional vertigo) 10/26/2012     Priority: Medium     Advance care planning 2011     Priority: Medium     Advance Care Planning 2016: Receipt of ACP document:  Received: Health Care Directive which was witnessed or notarized on 2013.  Document not previously scanned.  Validation form completed and sent with document to be scanned.  Code Status needs to be updated to reflect choices in most recent ACP document.  Confirmed/documented designated decision maker(s).  Added by Linda Delgado  Advance Care Planning 11:  Discussed advance care planning with patient; information given to patient to review. Sherrie Schmitt CMA            HYPERLIPIDEMIA LDL GOAL <130 10/31/2010     Priority: Medium       Family History   Problem Relation Age of Onset     Cardiovascular Paternal Grandfather          of heart attack     Hypertension Father      Lipids Father      Cancer Father         MELANOMA     Genitourinary Problems Mother      Cancer Paternal Grandmother      Asthma Sister      Asthma Sister        Social History     Socioeconomic History     Marital status: Single     Spouse name:      Number of children: 2     Years of education: Not on file     Highest education level: Not on file   Occupational History     Occupation: sales      Employer: [employer]     Comment: [employer]   Social Needs     Financial resource strain: Not on file     Food insecurity:     Worry: Not on file     Inability: Not on file     Transportation needs:     Medical: Not on file     Non-medical: Not on file   Tobacco Use     Smoking status: Former Smoker     Years: 18.00     Types: Cigarettes     Last attempt to quit: 1989     " Years since quittin.1     Smokeless tobacco: Never Used   Substance and Sexual Activity     Alcohol use: Yes     Alcohol/week: 1.8 oz     Types: 3 Standard drinks or equivalent per week     Comment: 2/week       Past Surgical History:   Procedure Laterality Date     APPENDECTOMY  2007     CL AFF SURGICAL PATHOLOGY  1970    with cryotherapy     COLONOSCOPY  2005    polyp excision, repeat  5 years      COLONOSCOPY N/A 9/15/2016    3 adenoma polyps, repeat in 3 years      D & C       HYSTEROSCOPY       TUBAL LIGATION             Review of Systems   Musculoskeletal: Positive for joint pain.   Neurological: Negative for tingling, sensory change and focal weakness.   All other systems reviewed and are negative.        Physical Exam  BP (!) 142/92   Ht 1.676 m (5' 6\")   Wt 73.9 kg (163 lb)   BMI 26.31 kg/m     Constitutional:well-developed, well-nourished, and in no distress.   Cardiovascular: Intact distal pulses.    Neurological: alert. Gait Normal:   Gait, station, stance, and balance appear normal for age  Skin: Skin is warm and dry.   Psychiatric: Mood and affect normal.   Respiratory: unlabored, speaks in full sentences  Lymph: no LAD, no lymphangitis            Left Shoulder Exam     Tenderness   The patient is experiencing no tenderness.     Range of Motion   Active abduction: normal   Passive abduction: 120   Extension: normal   External rotation: normal   Forward flexion: normal   Internal rotation 0 degrees: Sacrum     Muscle Strength   Abduction: 5/5   Internal rotation: 5/5   External rotation: 5/5   Supraspinatus: 5/5   Subscapularis: 5/5   Biceps: 5/5     Tests   Apprehension: negative  Nick test: positive  Cross arm: positive  Impingement: positive  Drop arm: negative  Sulcus: absent    Other   Erythema: absent  Sensation: normal  Pulse: present                  X-ray images Previously done and independently reviewed by me in the office today with the patient. X-ray shows:   Normal    LEFT " SHOULDER THREE VIEWS  11/20/2018 12:11 PM      HISTORY: Ongoing shoulder pain, hurts with range of motion. Left  shoulder pain, unspecified chronicity.     COMPARISON: None.                                                                      IMPRESSION: Normal.      DAMION SALAZAR MD    ASSESSMENT/PLAN    ICD-10-CM    1. Rotator cuff syndrome of left shoulder M75.102      Acute pain following a flu immunization last fall, however pain is more typical of a bursal pattern rather than a localized result of a shot.  Having been through extensive physical therapy, the next step may be a subacromial bursal cortisone injection.  Discussed options and patient eager to proceed.  If not improved after 2 or 3 weeks of resuming home exercises in addition to the shot, then an MRI would be indicated.  Patient comfortable with plan.    Large Joint Injection/Arthocentesis: L subacromial bursa  Date/Time: 2/22/2019 9:18 AM  Performed by: Rafi De Santiago MD  Authorized by: Rafi De Santiago MD     Indications:  Pain  Needle Size:  25 G  Guidance: landmark guided    Approach:  Posterolateral  Location:  Shoulder  Site:  L subacromial bursa  Medications:  4 mL lidocaine 1 %; 5 mL lidocaine 1 %; 40 mg triamcinolone 40 MG/ML  Outcome:  Tolerated well, no immediate complications  Procedure discussed: discussed risks, benefits, and alternatives    Timeout: timeout called immediately prior to procedure    Prep: patient was prepped and draped in usual sterile fashion     Lot: [id number] EXP: 05/2020              Again, thank you for allowing me to participate in the care of your patient.        Sincerely,        Rafi De Santiago MD

## 2019-02-23 LAB
COPATH REPORT: NORMAL
PAP: NORMAL

## 2019-02-26 ENCOUNTER — HOSPITAL ENCOUNTER (OUTPATIENT)
Dept: ULTRASOUND IMAGING | Facility: CLINIC | Age: 64
Discharge: HOME | End: 2019-02-26
Attending: FAMILY MEDICINE | Admitting: FAMILY MEDICINE
Payer: COMMERCIAL

## 2019-02-26 DIAGNOSIS — R22.1 LUMP IN NECK: ICD-10-CM

## 2019-02-26 DIAGNOSIS — E04.9 NODULAR GOITER: Primary | ICD-10-CM

## 2019-02-26 LAB
FINAL DIAGNOSIS: NORMAL
HPV HR 12 DNA CVX QL NAA+PROBE: NEGATIVE
HPV16 DNA SPEC QL NAA+PROBE: NEGATIVE
HPV18 DNA SPEC QL NAA+PROBE: NEGATIVE
SPECIMEN DESCRIPTION: NORMAL
SPECIMEN SOURCE CVX/VAG CYTO: NORMAL

## 2019-02-26 PROCEDURE — 76536 US EXAM OF HEAD AND NECK: CPT

## 2019-02-28 PROBLEM — Z12.4 CERVICAL CANCER SCREENING: Status: ACTIVE | Noted: 2019-02-28

## 2019-04-15 ENCOUNTER — OFFICE VISIT (OUTPATIENT)
Dept: ENDOCRINOLOGY | Facility: CLINIC | Age: 64
End: 2019-04-15
Attending: FAMILY MEDICINE
Payer: MEDICARE

## 2019-04-15 VITALS
DIASTOLIC BLOOD PRESSURE: 86 MMHG | TEMPERATURE: 97.6 F | SYSTOLIC BLOOD PRESSURE: 148 MMHG | HEIGHT: 66 IN | HEART RATE: 68 BPM | OXYGEN SATURATION: 98 % | WEIGHT: 163.5 LBS | BODY MASS INDEX: 26.28 KG/M2

## 2019-04-15 DIAGNOSIS — E04.9 NODULAR GOITER: Primary | ICD-10-CM

## 2019-04-15 DIAGNOSIS — E04.1 THYROID NODULE: ICD-10-CM

## 2019-04-15 PROCEDURE — 99204 OFFICE O/P NEW MOD 45 MIN: CPT | Performed by: INTERNAL MEDICINE

## 2019-04-15 ASSESSMENT — MIFFLIN-ST. JEOR: SCORE: 1303.38

## 2019-04-15 NOTE — LETTER
4/15/2019         RE: Love Pinon  [address] [facility] [address]        Dear Colleague,    Thank you for referring your patient, Love Pinon, to the [facility]. Please see a copy of my visit note below.    Name: Love Pinon  Seen in consultation with Bri House for thyroid nodule.   HPI:  Love Pinon is a 64 year old female who presents for the evaluation of thyroid nodule .  Thyroid nodule felt on physical examination.  This was followed by thyroid ultrasound showing multinodular goiter as noted below.    History of radiation exposure: NO  FH of thyroid problem: no thyroid cancer. + thyroid nodules in mother and mgm  History of thyroid dysfunction: NO  Palpitations:  Yes: anxiety 2/2 to thyroid nodule diagnosis  Changes to hair or skin: No  Diarrhea/Constipation:No  Changes in vision:No  Dysphagia or Shortness of breath:No  Muscle aches or pain:No  Tremors:No    PMH/PSH:  Past Medical History:   Diagnosis Date     BPPV (benign paroxysmal positional vertigo) 10/26/2012     Herpes genitalia      Hyperlipidemia      Hypertension      Iron deficiency anemia secondary to blood loss (chronic)     menorrhagia     Migraine headache without aura      Seizure disorder (H)     one seizure-grandmal in      Past Surgical History:   Procedure Laterality Date     APPENDECTOMY       [facility]  1970    with cryotherapy     COLONOSCOPY  2005    polyp excision, repeat  5 years      COLONOSCOPY N/A 9/15/2016    3 adenoma polyps, repeat in 3 years      D & C       HYSTEROSCOPY       TUBAL LIGATION       Family Hx:  Family History   Problem Relation Age of Onset     Cardiovascular Paternal Grandfather          of heart attack     Hypertension Father      Lipids Father      Cancer Father         MELANOMA     Genitourinary Problems Mother      Cancer Paternal Grandmother      Asthma Sister      Asthma Sister      Thyroid disease: History of  thyroid nodule in mother and  grandfather.  No family history of thyroid cancer           Social Hx:  Social History     Socioeconomic History     Marital status: Single     Spouse name:      Number of children: 2     Years of education: Not on file     Highest education level: Not on file   Occupational History     Occupation: sales      Employer: [employer]     Comment: [employer]   Social Needs     Financial resource strain: Not on file     Food insecurity:     Worry: Not on file     Inability: Not on file     Transportation needs:     Medical: Not on file     Non-medical: Not on file   Tobacco Use     Smoking status: Former Smoker     Years: 18.00     Types: Cigarettes     Last attempt to quit: 1989     Years since quittin.3     Smokeless tobacco: Never Used   Substance and Sexual Activity     Alcohol use: Yes     Alcohol/week: 1.8 oz     Types: 3 Standard drinks or equivalent per week     Comment: 2/week     Drug use: No     Sexual activity: Never   Lifestyle     Physical activity:     Days per week: Not on file     Minutes per session: Not on file     Stress: Not on file   Relationships     Social connections:     Talks on phone: Not on file     Gets together: Not on file     Attends [employer] service: Not on file     Active member of club or organization: Not on file     Attends meetings of clubs or organizations: Not on file     Relationship status: Not on file     Intimate partner violence:     Fear of current or ex partner: Not on file     Emotionally abused: Not on file     Physically abused: Not on file     Forced sexual activity: Not on file   Other Topics Concern      Service No     Blood Transfusions No     Caffeine Concern No     Occupational Exposure No     Hobby Hazards No     Sleep Concern Yes     Comment: trouble sleeping      Stress Concern Yes     Comment: little     Weight Concern No     Special Diet No     Back Care Yes     Comment: low back pain  "would like refferal to chiroprator     Exercise Yes     Comment: alot work with job     Bike Helmet No     Seat Belt Yes     Self-Exams Yes     Parent/sibling w/ CABG, MI or angioplasty before 65F 55M? No   Social History Narrative     is alcoholic and had stage 4 lung cancer    Two children    Dexa scan 8/18/05 normal          MEDICATIONS:  has a current medication list which includes the following prescription(s): calcium carbonate-vitamin d, cetirizine, cholecalciferol, cranberry, fish oil-omega-3 fatty acids, metoprolol succinate er, nabumetone, senna-docusate, simvastatin, and topiramate, and the following Facility-Administered Medications: lidocaine, lidocaine, and triamcinolone.    Review of Systems  10 point ROS neg other than the symptoms noted above in the HPI.    Physical Exam   VS: BP (!) 154/104 (BP Location: Right arm, Patient Position: Chair, Cuff Size: Adult Regular)   Pulse 68   Temp 97.6  F (36.4  C) (Oral)   Ht 1.676 m (5' 6\")   Wt 74.2 kg (163 lb 8 oz)   SpO2 98%   Breastfeeding? No   BMI 26.39 kg/m     GENERAL: AXOX3, NAD, well dressed, answering questions appropriately, appears stated age.  HEENT: No exopthalmous, no proptosis, EOMI, no lig lag, no retraction  NECK: Thyroid normal in size, non tender, a small nodule in midline palpated.  CV: RRR, no rubs, gallops, no murmurs  LUNGS: CTAB, no wheezes, rales, or ronchi  ABDOMEN: +BS  EXTREMITIES: no edema, +pulses, no rashes, no lesions  NEUROLOGY: CN grossly intact, no tremors  MSK: grossly intact  SKIN: no rashes, no lesions    LABS:  TFTs:  ENDO THYROID LABS-[facility] Latest Ref Rng & Units 2/20/2019 2/24/2018   TSH 0.40 - 4.00 mU/L 1.92 0.59   T4 FREE 0.76 - 1.46 ng/dL  1.24     Thyroid Ultrasound:  THYROID ULTRASOUND   2/26/2019 9:36 AM      HISTORY: Neck lump, mobile, possible thyroid nodule right lobe,  patient asymptomatic. Lump in neck.     COMPARISON: None.     FINDINGS:      Right lobe: 4.4 x 1.5 x 1.9 cm.     Left lobe: 4.5 x " 1.3 x 1.5 cm.     The background thyroid parenchyma is homogeneous.     Multiple nodules are identified in the right and left lobes of the  thyroid. The dominant nodules are listed as follows:  1. Right mid lobe: 0.7 x 0.7 x 0.7 cm, complex.  2. Right inferior lobe: 1.1 x 0.9 x 0.8 cm, complex with  calcifications.  3. Isthmus: 0.9 x 0.8 x 0.7 cm, complex.  4. Left superior lobe: 0.6 x 0.6 x 0.4 cm, cystic.  5. Left mid lobe: 0.9 x 0.9 x 0.5 cm, solid.  6. Left mid lobe: 0.7 x 0.6 x 0.5 cm, complex.  7. Left inferior lobe: 0.8 x 0.7 x 0.6 cm, complex.     A few additional less than 0.5 cm nodules are identified in both lobes  of the thyroid.                                                                      IMPRESSION: Multiple thyroid nodules are identified. The largest  nodules are listed as above.       All pertinent notes, labs, and images personally reviewed by me.     A/P  Ms.Christine QUE Pinon is a 64 year old here for the evaluation of thyroid nodule:  #1 Thyroid Nodule:  Thyroid nodules are common and are frequently benign. Data suggest that the prevalence of palpable thyroid nodules is 3% to 7% in [place]; the prevalence is as high as 50% based on ultrasonography (US) or autopsy data. All patients with a palpable thyroid nodule, however, should undergo US examination. US-guided FNA (US-FNA) is recommended for nodules ?10 mm; US-FNA is suggested for nodules <10 mm only if clinical information or US features are suspicious.  The frequency of thyroid nodules in general population was discussed. Also discussed possibility of malignancy, potential for thyroid autonomy. Discussed possible compressive symptoms and signs to watch for.  No history of radiation  No compressive symptoms  No family history of thyroid cancer  Thyroid labs are in normal range.  She is not on thyroid hormone replacement thyroid nodule showing multinodular goiter.  Most of the nodules are less than 1 cm.  Nodule #2 is noted to be  1.1 cm with calcifications   Plan:  Discussed diagnosis, pathophysiology, management and treatment options of condition with pt.  Most of the nodules are subcentimeter and in the setting of no major risk factor plan to continue to monitor  Nodule #2 on the right inferior side is 1.1 cm, appears complex with some calcification  I recommend biopsy of this nodule  Discussed possible outcomes of biopsy including possible benign, possible malignancy and possible AUS. If AUS indication for molecular marker testing.  Recommend compressive symptoms to watch for.  Follow-up after above.      Causes of thyroid Nodules: Benign (Multinodular goiter, Hashimoto s thyroiditis, Simple or hemorrhagic cysts, Follicular adenomas, Subacute thyroiditis) or Malignant(Papillary carcinoma, Follicular carcinoma, Hürthle cell carcinoma, Medullary carcinoma, Anaplastic carcinoma, Primary thyroid lymphoma, or Metastatic malignant lesion).    MultiNodule:  The risk of cancer is not significantly higher in palpable solitary thyroid nodules than in multinodular lesions or in nodules in diffuse goiters. In multinodular thyroid glands, the cytologic sampling should be focused on lesions characterized by suspicious US features rather than on larger or clinically dominant nodules.    Cyst:  Most complex thyroid nodules with a dominant fluid component are benign. USFNA, however, should always be performed because the rare papillary thyroid carcinoma (PTC) can be cystic. An unsatisfactory (nondiagnostic) specimen usually results from a cystic nodule that yields few or no follicular cells. Reaspiration yields satisfactory results in 50% of cases.    More than 50% of the time spent with Ms. Pinon on counseling / coordinating her care.      Follow-up:  After FNA    Gracie Ibarra MD  Endocrinology   [facility]    Cc: Bri House    Addendum to above note and clinic visit:    Labs reviewed.    See result note/telephone  encounter.            Again, thank you for allowing me to participate in the care of your patient.        Sincerely,        Gracie Ibarra MD

## 2019-04-15 NOTE — PATIENT INSTRUCTIONS
[facility] & [place] locations   Dr Ibarra, Endocrinology Department      [facility]   [address]  [place] [address]  Appointment [phone]  Fax: [phone]  [place]: Monday and Tuesday         [facility]   [address]  [place] [address]  Appointment [phone]  Fax: [phone]  [place]: Wednesday and Thursday          FNAB of right thyroid nodule.  Follow  After biopsy.     [facility] radiology scheduleing   [place]  [phone]   [facility] Radiology scheduling  [place]  [phone]     Please call and schedule the recommended test as discussed in clinic visit. These are the numbers to call.      Patient Education     Thyroid Fine Needle Aspiration (FNA) Biopsy    The thyroid is a gland at the front of the neck. A thyroid fine needle aspiration (FNA) biopsy is a procedure to remove a small piece of tissue from your thyroid gland. The tissue is removed with a small, hollow needle. The sample is sent to a lab to be examined to find a diagnosis.  Why thyroid FNA biopsy is done  Hard nodules can sometimes form inside the thyroid gland. You may notice a small bump in the gland area. Thyroid nodules are common. The nodules are often not dangerous. But in some cases they can be thyroid cancer. A thyroid FNA biopsy can test for cancer.  Risks of thyroid FNA biopsy  All procedures have some risks. The risks of thyroid FNA biopsy include:    Bleeding at the biopsy site    Infection    Damage to areas near the thyroid (rare)    Need for a repeat biopsy if the test result is in doubt  Getting ready for your procedure  Talk with your healthcare provider how to get ready. Tell him or her about all the medicines you take. This includes over-the-counter medicines such as ibuprofen. It also includes vitamins, herbs, and other supplements. You may need to stop taking some medicines  before the procedure, such as blood thinners and aspirin. You should be able to eat and drink normally before the procedure.  On the day of your procedure  Your procedure may be done in a hospital or a medical clinic. You should be able to go home the same day. A typical procedure goes like this:    You may be given a medicine to help you relax, if needed.          A local anesthetic (numbing medicine) may be injected in the area in the front of your neck. Because the biopsy needle is so small, this may not be needed.    Your healthcare provider may use an ultrasound machine during the biopsy. This machine uses sound waves and a computer to show live images of the tissues in your neck. This helps your healthcare provider guide the needle to the right spot. A gel will be put on your neck to help the ultrasound wand maintain contact with your skin and enhance the signal generated by the sound waves.     The biopsy area will be cleaned. A thin, fine (narrow) needle will be put through your skin and into your thyroid gland. You may feel a small amount of pain or pressure. The needle will be gently pushed into the nodule. A syringe attached to the needle will use gentle suction to remove a small piece of tissue from the nodule. This process may be repeated several times in the same nodule to get different samples from all parts of the nodule. You will need to be very still and not cough, talk, or swallow while the needle is put in.     The needle will then be removed. A small bandage will be put on the needle insertion site. The tissue will be sent to a pathology lab to be looked at for signs of cancer.    Sometimes the healthcare provider will use the ultrasound want again 15 to 30 minutes later to be sure there is no bleeding or swelling where the biopsy was performed.  After your procedure  You ll likely be able to go home that day and can go back to your normal activities right away. You can remove your bandage  within a few hours.  The site of the biopsy may be sore for a day or two after the procedure. You can take over-the-counter pain medicine such as acetaminophen as needed. Follow any other instructions that your healthcare provider gives you.  Follow-up care  Ask your healthcare provider when to expect to get your results from the biopsy. It may take several days. In some cases, the biopsy result may be inconclusive. This means the lab can t be sure if your nodule is cancer. You may need a repeat biopsy or surgery.  If your thyroid nodule is not cancer, you may not need any treatment. Your healthcare provider may want to keep track of your nodule. You may need another biopsy in the future.  If your nodule is cancer, you may need surgery or other treatment. Your healthcare provider will tell you more about what to expect and what needs to be done next.     When to call your healthcare provider  Call your healthcare provider right away if you have any of the following:    Fever of 100.4 F (38 C) or higher    Redness, swelling, bleeding, or fluid leaking from the biopsy site    Pain around the biopsy site that gets worse  Be sure you know how to reach your healthcare provider after office hours and on weekends and holidays.   Date Last Reviewed: 6/1/2018 2000-2018 [employer]. [address] [place] [address], [place] [address]. All rights reserved. This information is not intended as a substitute for professional medical care. Always follow your healthcare professional's instructions.

## 2019-04-15 NOTE — NURSING NOTE
ENDOCRINOLOGY INTAKE FORM    Patient Name:  Love Pinon  :  1955    Is patient Diabetic?   No  Does patient have non-diabetic or other endocrine issues?  Yes: nodular goiter, hyponatremia, osteoporosis    Vitals: There were no vitals taken for this visit.  BMI= There is no height or weight on file to calculate BMI.    Flu vaccine:  Yes: 18  Pneumonia vaccine:  No    Smoking and Alcohol use:  Social History     Tobacco Use     Smoking status: Former Smoker     Years: 18.00     Types: Cigarettes     Last attempt to quit: 1989     Years since quittin.3     Smokeless tobacco: Never Used   Substance Use Topics     Alcohol use: Yes     Alcohol/week: 1.8 oz     Types: 3 Standard drinks or equivalent per week     Comment: 2/week     Drug use: No       Angelika Hendrickson CMA  [facility]  [place]

## 2019-04-15 NOTE — PROGRESS NOTES
Name: Love Pinon  Seen in consultation with Bri House for thyroid nodule.   HPI:  Love Pinon is a 64 year old female who presents for the evaluation of thyroid nodule .  Thyroid nodule felt on physical examination.  This was followed by thyroid ultrasound showing multinodular goiter as noted below.    History of radiation exposure: NO  FH of thyroid problem: no thyroid cancer. + thyroid nodules in mother and mgm  History of thyroid dysfunction: NO  Palpitations:  Yes: anxiety 2/2 to thyroid nodule diagnosis  Changes to hair or skin: No  Diarrhea/Constipation:No  Changes in vision:No  Dysphagia or Shortness of breath:No  Muscle aches or pain:No  Tremors:No    PMH/PSH:  Past Medical History:   Diagnosis Date     BPPV (benign paroxysmal positional vertigo) 10/26/2012     Herpes genitalia      Hyperlipidemia      Hypertension      Iron deficiency anemia secondary to blood loss (chronic)     menorrhagia     Migraine headache without aura      Seizure disorder (H)     one seizure-grandmal in      Past Surgical History:   Procedure Laterality Date     APPENDECTOMY       CL AFF SURGICAL PATHOLOGY  1970    with cryotherapy     COLONOSCOPY  2005    polyp excision, repeat  5 years      COLONOSCOPY N/A 9/15/2016    3 adenoma polyps, repeat in 3 years      D & C       HYSTEROSCOPY       TUBAL LIGATION       Family Hx:  Family History   Problem Relation Age of Onset     Cardiovascular Paternal Grandfather          of heart attack     Hypertension Father      Lipids Father      Cancer Father         MELANOMA     Genitourinary Problems Mother      Cancer Paternal Grandmother      Asthma Sister      Asthma Sister      Thyroid disease: History of thyroid nodule in mother and  grandfather.  No family history of thyroid cancer           Social Hx:  Social History     Socioeconomic History     Marital status: Single     Spouse name:      Number of children: 2     Years of education: Not on  file     Highest education level: Not on file   Occupational History     Occupation: sales      Employer: [employer]     Comment: [employer]   Social Needs     Financial resource strain: Not on file     Food insecurity:     Worry: Not on file     Inability: Not on file     Transportation needs:     Medical: Not on file     Non-medical: Not on file   Tobacco Use     Smoking status: Former Smoker     Years: 18.00     Types: Cigarettes     Last attempt to quit: 1989     Years since quittin.3     Smokeless tobacco: Never Used   Substance and Sexual Activity     Alcohol use: Yes     Alcohol/week: 1.8 oz     Types: 3 Standard drinks or equivalent per week     Comment: 2/week     Drug use: No     Sexual activity: Never   Lifestyle     Physical activity:     Days per week: Not on file     Minutes per session: Not on file     Stress: Not on file   Relationships     Social connections:     Talks on phone: Not on file     Gets together: Not on file     Attends Voodoo service: Not on file     Active member of club or organization: Not on file     Attends meetings of clubs or organizations: Not on file     Relationship status: Not on file     Intimate partner violence:     Fear of current or ex partner: Not on file     Emotionally abused: Not on file     Physically abused: Not on file     Forced sexual activity: Not on file   Other Topics Concern      Service No     Blood Transfusions No     Caffeine Concern No     Occupational Exposure No     Hobby Hazards No     Sleep Concern Yes     Comment: trouble sleeping      Stress Concern Yes     Comment: little     Weight Concern No     Special Diet No     Back Care Yes     Comment: low back pain would like refferal to chiroprator     Exercise Yes     Comment: alot work with job     Bike Helmet No     Seat Belt Yes     Self-Exams Yes     Parent/sibling w/ CABG, MI or angioplasty before 65F 55M? No   Social History Narrative     is  "alcoholic and had stage 4 lung cancer    Two children    Dexa scan 8/18/05 normal          MEDICATIONS:  has a current medication list which includes the following prescription(s): calcium carbonate-vitamin d, cetirizine, cholecalciferol, cranberry, fish oil-omega-3 fatty acids, metoprolol succinate er, nabumetone, senna-docusate, simvastatin, and topiramate, and the following Facility-Administered Medications: lidocaine, lidocaine, and triamcinolone.    Review of Systems  10 point ROS neg other than the symptoms noted above in the HPI.    Physical Exam   VS: BP (!) 154/104 (BP Location: Right arm, Patient Position: Chair, Cuff Size: Adult Regular)   Pulse 68   Temp 97.6  F (36.4  C) (Oral)   Ht 1.676 m (5' 6\")   Wt 74.2 kg (163 lb 8 oz)   SpO2 98%   Breastfeeding? No   BMI 26.39 kg/m    GENERAL: AXOX3, NAD, well dressed, answering questions appropriately, appears stated age.  HEENT: No exopthalmous, no proptosis, EOMI, no lig lag, no retraction  NECK: Thyroid normal in size, non tender, a small nodule in midline palpated.  CV: RRR, no rubs, gallops, no murmurs  LUNGS: CTAB, no wheezes, rales, or ronchi  ABDOMEN: +BS  EXTREMITIES: no edema, +pulses, no rashes, no lesions  NEUROLOGY: CN grossly intact, no tremors  MSK: grossly intact  SKIN: no rashes, no lesions    LABS:  TFTs:  ENDO THYROID LABS-P Latest Ref Rng & Units 2/20/2019 2/24/2018   TSH 0.40 - 4.00 mU/L 1.92 0.59   T4 FREE 0.76 - 1.46 ng/dL  1.24     Thyroid Ultrasound:  THYROID ULTRASOUND   2/26/2019 9:36 AM      HISTORY: Neck lump, mobile, possible thyroid nodule right lobe,  patient asymptomatic. Lump in neck.     COMPARISON: None.     FINDINGS:      Right lobe: 4.4 x 1.5 x 1.9 cm.     Left lobe: 4.5 x 1.3 x 1.5 cm.     The background thyroid parenchyma is homogeneous.     Multiple nodules are identified in the right and left lobes of the  thyroid. The dominant nodules are listed as follows:  1. Right mid lobe: 0.7 x 0.7 x 0.7 cm, complex.  2. " Right inferior lobe: 1.1 x 0.9 x 0.8 cm, complex with  calcifications.  3. Isthmus: 0.9 x 0.8 x 0.7 cm, complex.  4. Left superior lobe: 0.6 x 0.6 x 0.4 cm, cystic.  5. Left mid lobe: 0.9 x 0.9 x 0.5 cm, solid.  6. Left mid lobe: 0.7 x 0.6 x 0.5 cm, complex.  7. Left inferior lobe: 0.8 x 0.7 x 0.6 cm, complex.     A few additional less than 0.5 cm nodules are identified in both lobes  of the thyroid.                                                                      IMPRESSION: Multiple thyroid nodules are identified. The largest  nodules are listed as above.       All pertinent notes, labs, and images personally reviewed by me.     A/P  Ms.Christine QUE Pinon is a 64 year old here for the evaluation of thyroid nodule:  #1 Thyroid Nodule:  Thyroid nodules are common and are frequently benign. Data suggest that the prevalence of palpable thyroid nodules is 3% to 7% in [place]; the prevalence is as high as 50% based on ultrasonography (US) or autopsy data. All patients with a palpable thyroid nodule, however, should undergo US examination. US-guided FNA (US-FNA) is recommended for nodules ?10 mm; US-FNA is suggested for nodules <10 mm only if clinical information or US features are suspicious.  The frequency of thyroid nodules in general population was discussed. Also discussed possibility of malignancy, potential for thyroid autonomy. Discussed possible compressive symptoms and signs to watch for.  No history of radiation  No compressive symptoms  No family history of thyroid cancer  Thyroid labs are in normal range.  She is not on thyroid hormone replacement thyroid nodule showing multinodular goiter.  Most of the nodules are less than 1 cm.  Nodule #2 is noted to be 1.1 cm with calcifications   Plan:  Discussed diagnosis, pathophysiology, management and treatment options of condition with pt.  Most of the nodules are subcentimeter and in the setting of no major risk factor plan to continue to monitor  Nodule  #2 on the right inferior side is 1.1 cm, appears complex with some calcification  I recommend biopsy of this nodule  Discussed possible outcomes of biopsy including possible benign, possible malignancy and possible AUS. If AUS indication for molecular marker testing.  Recommend compressive symptoms to watch for.  Follow-up after above.      Causes of thyroid Nodules: Benign (Multinodular goiter, Hashimoto s thyroiditis, Simple or hemorrhagic cysts, Follicular adenomas, Subacute thyroiditis) or Malignant(Papillary carcinoma, Follicular carcinoma, Hürthle cell carcinoma, Medullary carcinoma, Anaplastic carcinoma, Primary thyroid lymphoma, or Metastatic malignant lesion).    MultiNodule:  The risk of cancer is not significantly higher in palpable solitary thyroid nodules than in multinodular lesions or in nodules in diffuse goiters. In multinodular thyroid glands, the cytologic sampling should be focused on lesions characterized by suspicious US features rather than on larger or clinically dominant nodules.    Cyst:  Most complex thyroid nodules with a dominant fluid component are benign. USFNA, however, should always be performed because the rare papillary thyroid carcinoma (PTC) can be cystic. An unsatisfactory (nondiagnostic) specimen usually results from a cystic nodule that yields few or no follicular cells. Reaspiration yields satisfactory results in 50% of cases.    More than 50% of the time spent with Ms. Pinon on counseling / coordinating her care.      Follow-up:  After FNA    Gracie Ibarra MD  Endocrinology   [facility]    Cc: Bri House    Addendum to above note and clinic visit:    Labs reviewed.    See result note/telephone encounter.

## 2019-04-29 ENCOUNTER — MYC REFILL (OUTPATIENT)
Dept: FAMILY MEDICINE | Facility: CLINIC | Age: 64
End: 2019-04-29

## 2019-04-29 DIAGNOSIS — G43.019 INTRACTABLE MIGRAINE WITHOUT AURA AND WITHOUT STATUS MIGRAINOSUS: ICD-10-CM

## 2019-04-29 NOTE — LETTER
May 3, 2019      Love Pinon  [id number] [address]        Dear Love,    I care about your health and have reviewed your health plan. I have reviewed your medical conditions, medication list, and lab results and am making recommendations based on this review, to better manage your health.    You are in particular need of attention regarding:  Medication check    I am recommending that you:  -Schedule an appointment    Here is a list of Health Maintenance topics that are due now or due soon:  Health Maintenance Due   Topic Date Due     Zoster (Shingles) Vaccine (1 of 2) 03/18/2004     FALL RISK ASSESSMENT  03/18/2019     Pneumococcal Vaccine (1 of 2 - PCV13) 03/18/2019       Please call us at [phone] (or use [employer]) to address the above recommendations.     Thank you for trusting [facility] and we appreciate the opportunity to serve you.  We look forward to supporting your healthcare needs in the future.    Healthy Regards,    Bri House MD

## 2019-04-30 RX ORDER — TOPIRAMATE 25 MG/1
50 TABLET, FILM COATED ORAL 2 TIMES DAILY
Qty: 120 TABLET | Refills: 1 | Status: SHIPPED | OUTPATIENT
Start: 2019-04-30 | End: 2019-05-20

## 2019-04-30 NOTE — TELEPHONE ENCOUNTER
Patient states that the hospital refilled this -  She currently takes 2 in the am and 2 in the evening this was prescribed by Anila Gunter MD   please refill this asap., will run out tomorrow  Carina Lozano RN BSN  [facility]  [phone]

## 2019-04-30 NOTE — TELEPHONE ENCOUNTER
"Requested Prescriptions   Pending Prescriptions Disp Refills     topiramate (TOPAMAX) 25 MG tablet 180 tablet 3     Sig: Take 1 tablet (25 mg) by mouth 2 times daily       Anti-Seizure Meds Protocol  Failed - 4/29/2019 12:11 PM        Failed - Recent (12 mo) or future (30 days) visit within the authorizing provider's specialty     Patient had office visit in the last 12 months or has a visit in the next 30 days with authorizing provider or within the authorizing provider's specialty.  See \"Patient Info\" tab in inbasket, or \"Choose Columns\" in Meds & Orders section of the refill encounter.              Failed - Review Authorizing provider's last note.      Refer to last progress notes: confirm request is for original authorizing provider (cannot be through other providers).          Passed - Normal CBC on file in past 26 months     Recent Labs   Lab Test 11/08/18  1519   WBC 6.0   RBC 4.03   HGB 13.3   HCT 40.4                    Passed - Normal serum creatinine on file in past 26 months     Recent Labs   Lab Test 11/20/18  1112   CR 0.88             Passed - Normal ALT or AST on file in past 26 months     Recent Labs   Lab Test 02/20/19  0946   ALT 31     Recent Labs   Lab Test 02/20/19  0946   AST 25             Passed - Normal platelet count on file in past 26 months     Recent Labs   Lab Test 11/08/18  1519                  Passed - Medication is active on med list        Passed - No active pregnancy on record        Passed - No positive pregnancy test in last 12 months        Last Written Prescription Date:  2/22/2017  Last Fill Quantity: ,  180# refills:  3  Last office visit: 2/20/2019 with prescribing provider:     Future Office Visit:      "

## 2019-05-01 NOTE — TELEPHONE ENCOUNTER
Routing to team to inform and assist in scheduling.   Dayami Joshua RN   [facility] - Triage

## 2019-05-01 NOTE — TELEPHONE ENCOUNTER
Script refill faxed for small refill. May need to check with original provider for ongoing refill or do med check f/u here if needed

## 2019-05-02 NOTE — TELEPHONE ENCOUNTER
Norma Melissa contacted [place] on 05/02/19 and left a message. If patient calls back please schedule appointment as soon as possible for a medication check.        .Norma GREY    [facility] [place]

## 2019-05-03 NOTE — TELEPHONE ENCOUNTER
Norma Melissa contacted [place] on 05/03/19 and left a message. If patient calls back please schedule appointment in 1 month for a medication check. TC will mail letter.        .Norma GREY    [facility] [place]

## 2019-05-07 ENCOUNTER — HOSPITAL ENCOUNTER (OUTPATIENT)
Dept: ULTRASOUND IMAGING | Facility: CLINIC | Age: 64
End: 2019-05-07
Attending: INTERNAL MEDICINE
Payer: COMMERCIAL

## 2019-05-07 ENCOUNTER — HOSPITAL ENCOUNTER (OUTPATIENT)
Facility: CLINIC | Age: 64
Discharge: HOME | End: 2019-05-07
Admitting: INTERNAL MEDICINE
Payer: COMMERCIAL

## 2019-05-07 VITALS — DIASTOLIC BLOOD PRESSURE: 96 MMHG | RESPIRATION RATE: 16 BRPM | SYSTOLIC BLOOD PRESSURE: 157 MMHG

## 2019-05-07 DIAGNOSIS — E04.9 NODULAR GOITER: ICD-10-CM

## 2019-05-07 DIAGNOSIS — E04.1 THYROID NODULE: ICD-10-CM

## 2019-05-07 PROCEDURE — 25000125 ZZHC RX 250: Performed by: INTERNAL MEDICINE

## 2019-05-07 PROCEDURE — 10005 FNA BX W/US GDN 1ST LES: CPT

## 2019-05-07 PROCEDURE — 40000863 ZZH STATISTIC RADIOLOGY XRAY, US, CT, MAR, NM

## 2019-05-07 PROCEDURE — 88173 CYTOPATH EVAL FNA REPORT: CPT | Performed by: INTERNAL MEDICINE

## 2019-05-07 RX ORDER — NICOTINE POLACRILEX 4 MG
15-30 LOZENGE BUCCAL
Status: DISCONTINUED | OUTPATIENT
Start: 2019-05-07 | End: 2019-05-07 | Stop reason: HOSPADM

## 2019-05-07 RX ORDER — DEXTROSE MONOHYDRATE 25 G/50ML
25-50 INJECTION, SOLUTION INTRAVENOUS
Status: DISCONTINUED | OUTPATIENT
Start: 2019-05-07 | End: 2019-05-07 | Stop reason: HOSPADM

## 2019-05-07 RX ORDER — LIDOCAINE 40 MG/G
CREAM TOPICAL
Status: DISCONTINUED | OUTPATIENT
Start: 2019-05-07 | End: 2019-05-07 | Stop reason: HOSPADM

## 2019-05-07 RX ORDER — LIDOCAINE HYDROCHLORIDE 10 MG/ML
10 INJECTION, SOLUTION EPIDURAL; INFILTRATION; INTRACAUDAL; PERINEURAL ONCE
Status: COMPLETED | OUTPATIENT
Start: 2019-05-07 | End: 2019-05-07

## 2019-05-07 RX ADMIN — LIDOCAINE HYDROCHLORIDE 10 ML: 10 INJECTION, SOLUTION EPIDURAL; INFILTRATION; INTRACAUDAL; PERINEURAL at 09:01

## 2019-05-07 NOTE — DISCHARGE INSTRUCTIONS
Thyroid Biopsy Discharge Instructions     After you go home:      You may resume your normal diet    Care of Puncture Site:      You may have mild bruising, soreness & swelling at the puncture site. This will go away in a few days    For swelling & bruising, you may use an ice pack on the site.     Activity:      You may go back to your normal activity    Avoid strenuous activity for 24 hours    Medicines:      You may resume all medications    For minor pain, you may take Acetaminophen (Tylenol) or Ibuprofen (Advil)            Call the provider who ordered this test if:      Increased redness or swelling at the site    Fluid or blood oozing from the site    Severe pain at the site    Chills or a fever greater than 101 F (38 C).    Any questions or concerns    Call  911 or go to the Emergency Room if:      Trouble breathing    Your neck swells    Bleeding that you cannot control    Severe difficulty swallowing    If you have questions call:      [facility] Radiology Dept @ [phone]    The provider who performed your procedure was ___________Dr Monzon______.

## 2019-05-07 NOTE — PROGRESS NOTES
Discharge instructions reviewed with pt prior to discharge to home. Pt appears to accept and understand. Biopsy site without bleeding or hematoma. Pt stable and ambulating safely at time of discharge to home.

## 2019-05-07 NOTE — PROGRESS NOTES
Pre procedure plan of care reviewed with pt prior to exam.  All questions answered and pt appears to accept and understand.

## 2019-05-08 LAB — COPATH REPORT: NORMAL

## 2019-05-20 ENCOUNTER — OFFICE VISIT (OUTPATIENT)
Dept: FAMILY MEDICINE | Facility: CLINIC | Age: 64
End: 2019-05-20
Payer: COMMERCIAL

## 2019-05-20 VITALS
SYSTOLIC BLOOD PRESSURE: 134 MMHG | TEMPERATURE: 97.3 F | WEIGHT: 164 LBS | HEIGHT: 66 IN | HEART RATE: 83 BPM | BODY MASS INDEX: 26.36 KG/M2 | DIASTOLIC BLOOD PRESSURE: 88 MMHG | OXYGEN SATURATION: 98 %

## 2019-05-20 DIAGNOSIS — I10 HYPERTENSION GOAL BP (BLOOD PRESSURE) < 140/90: ICD-10-CM

## 2019-05-20 DIAGNOSIS — G43.009 MIGRAINE WITHOUT AURA AND WITHOUT STATUS MIGRAINOSUS, NOT INTRACTABLE: ICD-10-CM

## 2019-05-20 DIAGNOSIS — G40.909 SEIZURE DISORDER (H): ICD-10-CM

## 2019-05-20 DIAGNOSIS — M81.0 AGE-RELATED OSTEOPOROSIS WITHOUT CURRENT PATHOLOGICAL FRACTURE: ICD-10-CM

## 2019-05-20 DIAGNOSIS — I10 BENIGN ESSENTIAL HYPERTENSION: Primary | ICD-10-CM

## 2019-05-20 DIAGNOSIS — M25.512 LEFT SHOULDER PAIN, UNSPECIFIED CHRONICITY: ICD-10-CM

## 2019-05-20 DIAGNOSIS — E78.5 HYPERLIPIDEMIA LDL GOAL <130: ICD-10-CM

## 2019-05-20 DIAGNOSIS — Z23 NEED FOR VACCINATION: ICD-10-CM

## 2019-05-20 PROCEDURE — 36415 COLL VENOUS BLD VENIPUNCTURE: CPT | Performed by: FAMILY MEDICINE

## 2019-05-20 PROCEDURE — 99214 OFFICE O/P EST MOD 30 MIN: CPT | Performed by: FAMILY MEDICINE

## 2019-05-20 PROCEDURE — G0009 ADMIN PNEUMOCOCCAL VACCINE: HCPCS | Performed by: FAMILY MEDICINE

## 2019-05-20 PROCEDURE — 80053 COMPREHEN METABOLIC PANEL: CPT | Performed by: FAMILY MEDICINE

## 2019-05-20 PROCEDURE — 90670 PCV13 VACCINE IM: CPT | Performed by: FAMILY MEDICINE

## 2019-05-20 RX ORDER — IBANDRONATE SODIUM 150 MG/1
150 TABLET, FILM COATED ORAL
COMMUNITY
End: 2019-05-20

## 2019-05-20 RX ORDER — TOPIRAMATE 25 MG/1
50 TABLET, FILM COATED ORAL 2 TIMES DAILY
Qty: 120 TABLET | Refills: 1 | Status: SHIPPED | OUTPATIENT
Start: 2019-05-20 | End: 2020-06-17

## 2019-05-20 RX ORDER — SIMVASTATIN 40 MG
40 TABLET ORAL AT BEDTIME
Qty: 90 TABLET | Refills: 3 | Status: SHIPPED | OUTPATIENT
Start: 2019-05-20 | End: 2020-06-15

## 2019-05-20 RX ORDER — IBANDRONATE SODIUM 150 MG/1
150 TABLET, FILM COATED ORAL
Qty: 3 TABLET | Refills: 3 | Status: SHIPPED | OUTPATIENT
Start: 2019-05-20 | End: 2020-11-19

## 2019-05-20 RX ORDER — METOPROLOL SUCCINATE 50 MG/1
75 TABLET, EXTENDED RELEASE ORAL DAILY
Qty: 135 TABLET | Refills: 1 | Status: SHIPPED | OUTPATIENT
Start: 2019-05-20 | End: 2019-11-18

## 2019-05-20 RX ORDER — FEXOFENADINE HCL 180 MG/1
180 TABLET ORAL DAILY
COMMUNITY

## 2019-05-20 ASSESSMENT — MIFFLIN-ST. JEOR: SCORE: 1305.65

## 2019-05-20 NOTE — PROGRESS NOTES
Subjective     Love Pinon is a 64 year old female who presents to clinic today for the following health issues:    Medication Followup of  Metoprolol, Topamax, BONIVA 150 mg    Taking Medication as prescribed: yes    Side Effects:  None    Medication Helping Symptoms:  yes        Hypertension Follow-up       Do you check your blood pressure regularly outside of the clinic? No not often     Are you following a low salt diet? No    Are your blood pressures ever more than 140 on the top number (systolic) OR more   than 90 on the bottom number (diastolic), for example 140/90? No not home     Amount of exercise or physical activity: 6-7 days/week for an average of 2 mile, light aerobic     Problems taking medications regularly: No    Medication side effects: none    Diet: regular (no restrictions)    Need refill on boniva  recently , months ago . and no problem taking it.  Comfortable continuing and needs refill.  She also past due for her bone density for few years so willing to schedule now.   Also has been on Topamax , for seizure / migraine for many years.  And has been followed by the neurologist. It has working well, and she needs refill.   Last saw neurologist a year ago . She was on other seizure medications for a number 4 years but all those were stopped by neurologist last year. Topamax was continued . Unsure if it was originally started because  of seizure or migraine, but she has had  no migraine for many years. She has mid HA sometimes but not a very classic migraine anymore.  He has seizure was also many many years ago, although had another EEG over a year ago ad it shows some activity , so she was told o continue with Topamax for that reason .  But she is running out so needed refill       Add-on problem  Has ongoing left shoulder issue for a while.  had injection few months ago it worked for a while now feeling sore again. Has a trip next month, so debating if she needs another injection. She  had PT last year with some help .   {  Patient Active Problem List   Diagnosis     HYPERLIPIDEMIA LDL GOAL <130     Advance care planning     BPPV (benign paroxysmal positional vertigo)     Migraine headache without aura     Hypertension goal BP (blood pressure) < 140/90     Seizure disorder (H)     Osteoporosis     Chronic idiopathic constipation     Hyponatremia     Left shoulder pain, unspecified chronicity     Screening for breast cancer     Rotator cuff syndrome of left shoulder     Nodular goiter     Cervical cancer screening     Thyroid nodule     Past Surgical History:   Procedure Laterality Date     APPENDECTOMY       CL AFF SURGICAL PATHOLOGY  1970    with cryotherapy     COLONOSCOPY      polyp excision, repeat  5 years      COLONOSCOPY N/A 9/15/2016    3 adenoma polyps, repeat in 3 years      D & C       HYSTEROSCOPY       TUBAL LIGATION         Social History     Tobacco Use     Smoking status: Former Smoker     Years: 18.00     Types: Cigarettes     Last attempt to quit: 1989     Years since quittin.4     Smokeless tobacco: Never Used   Substance Use Topics     Alcohol use: Yes     Alcohol/week: 1.8 oz     Types: 3 Standard drinks or equivalent per week     Comment: 2/week     Family History   Problem Relation Age of Onset     Cardiovascular Paternal Grandfather          of heart attack     Hypertension Father      Lipids Father      Cancer Father         MELANOMA     Genitourinary Problems Mother      Cancer Paternal Grandmother      Asthma Sister      Asthma Sister            Reviewed and updated as needed this visit by Provider         Review of Systems   ROS COMP: Constitutional, HEENT, cardiovascular, pulmonary, GI, , musculoskeletal, neuro, skin, endocrine and psych systems are negative, except as otherwise noted.      Objective    There were no vitals taken for this visit.  There is no height or weight on file to calculate BMI.  Physical Exam   GENERAL: healthy, alert and  no distress  NECK: no adenopathy,   RESP: lungs clear to auscultation - no rales, rhonchi or wheezes  CV: regular rate and rhythm, normal S1 S2, no S3 or S4, no murmur, click or rub, no peripheral edema and peripheral pulses strong  ABDOMEN: soft, nontender, no hepatosplenomegaly, no masses and bowel sounds normal  MS: left shoulder with slight good range of motion  and tenderness to palpation mostly across anterior shoulder and trapezius area, range of motion aggravates pain.  Neck range of motion is also good.  NEURO: Normal strength and tone, mentation intact and speech normal  PSYCH: mentation appears normal, affect normal/bright,            Assessment & Plan      (I10) Benign essential hypertension  (primary encounter diagnosis)  Comment: Stable   Plan: metoprolol succinate ER (TOPROL-XL) 50 MG 24 hr        tablet            (I10) Hypertension goal BP (blood pressure) < 140/90  Comment: stable   Plan: metoprolol succinate ER (TOPROL-XL) 50 MG 24 hr        tablet            (G40.909) Seizure disorder (H)  Comment: stable, being followed by neurology.  need neurology y f/u for med's as well   Plan: topiramate (TOPAMAX) 25 MG tablet            (G43.009) Migraine without aura and without status migrainosus, not intractable  Comment: stable -    Plan: topiramate (TOPAMAX) 25 MG tablet            (M25.512) Left shoulder pain, unspecified chronicity  Comment: Significant problems with recent aggravation   Plan: [facility], AND [facility]IROPRACTIC REFERRAL        She is willing to try for some PT , to help.  she will also consider scheduling follow-up with orthopedic if needed.       (M81.0) Age-related osteoporosis without current pathological fracture  Comment: due for f/u dexa , tolerating med's well   Plan: DX Hip/Pelvis/Spine, IBANdronate (BONIVA) 150         MG tablet            (E78.5) Hyperlipidemia LDL goal <130  Comment: stable   Plan: simvastatin (ZOCOR) 40 MG tablet                BMI:   Estimated body mass  "index is 26.47 kg/m  as calculated from the following:    Height as of this encounter: 1.676 m (5' 6\").    Weight as of this encounter: 74.4 kg (164 lb).         Check labs. refill sent.Cares and  treatment discussed follow. up if problem   Patient expressed understanding and agreement with treatment plan. All patient's questions were answered, will let me know if has more later.  Medications: Rx's: Reviewed the potential side effects/complications of medications prescribed.           No follow-ups on file.    Bri House MD  [facility]      "

## 2019-05-20 NOTE — PATIENT INSTRUCTIONS
Check labs   Take medications as directed.  Cares and symptomatic cares discussed   Follow up if problem or concern

## 2019-05-21 LAB
ALBUMIN SERPL-MCNC: 4 G/DL (ref 3.4–5)
ALP SERPL-CCNC: 86 U/L (ref 40–150)
ALT SERPL W P-5'-P-CCNC: 27 U/L (ref 0–50)
ANION GAP SERPL CALCULATED.3IONS-SCNC: 8 MMOL/L (ref 3–14)
AST SERPL W P-5'-P-CCNC: 22 U/L (ref 0–45)
BILIRUB SERPL-MCNC: 0.3 MG/DL (ref 0.2–1.3)
BUN SERPL-MCNC: 13 MG/DL (ref 7–30)
CALCIUM SERPL-MCNC: 10.3 MG/DL (ref 8.5–10.1)
CHLORIDE SERPL-SCNC: 108 MMOL/L (ref 94–109)
CO2 SERPL-SCNC: 23 MMOL/L (ref 20–32)
CREAT SERPL-MCNC: 0.87 MG/DL (ref 0.52–1.04)
GFR SERPL CREATININE-BSD FRML MDRD: 70 ML/MIN/{1.73_M2}
GLUCOSE SERPL-MCNC: 99 MG/DL (ref 70–99)
POTASSIUM SERPL-SCNC: 4.5 MMOL/L (ref 3.4–5.3)
PROT SERPL-MCNC: 7.7 G/DL (ref 6.8–8.8)
SODIUM SERPL-SCNC: 139 MMOL/L (ref 133–144)

## 2019-05-30 ENCOUNTER — HOSPITAL ENCOUNTER (OUTPATIENT)
Dept: BONE DENSITY | Facility: CLINIC | Age: 64
Discharge: HOME | End: 2019-05-30
Attending: FAMILY MEDICINE | Admitting: FAMILY MEDICINE
Payer: COMMERCIAL

## 2019-05-30 DIAGNOSIS — M81.0 AGE-RELATED OSTEOPOROSIS WITHOUT CURRENT PATHOLOGICAL FRACTURE: ICD-10-CM

## 2019-05-30 PROCEDURE — 77080 DXA BONE DENSITY AXIAL: CPT

## 2019-06-18 DIAGNOSIS — E78.5 HYPERLIPIDEMIA LDL GOAL <130: ICD-10-CM

## 2019-06-18 RX ORDER — SIMVASTATIN 40 MG
TABLET ORAL
Qty: 90 TABLET | Refills: 0 | OUTPATIENT
Start: 2019-06-18

## 2019-06-18 NOTE — TELEPHONE ENCOUNTER
"Requested Prescriptions   Pending Prescriptions Disp Refills     simvastatin (ZOCOR) 40 MG tablet [Pharmacy Med Name: SIMVASTATIN 40MG TABLETS] 90 tablet 0     Sig: TAKE 1 TABLET(40 MG) BY MOUTH AT BEDTIME  Last Written Prescription Date:  5/20/19  Last Fill Quantity: 90,  # refills: 3   Last office visit: 5/20/2019 with prescribing provider:  Harsh   Future Office Visit:           Statins Protocol Passed - 6/18/2019  3:27 AM        Passed - LDL on file in past 12 months     Recent Labs   Lab Test 02/20/19  0946   LDL 99             Passed - No abnormal creatine kinase in past 12 months     Recent Labs   Lab Test 11/08/18  1519   CKT 95                Passed - Recent (12 mo) or future (30 days) visit within the authorizing provider's specialty     Patient had office visit in the last 12 months or has a visit in the next 30 days with authorizing provider or within the authorizing provider's specialty.  See \"Patient Info\" tab in inbasket, or \"Choose Columns\" in Meds & Orders section of the refill encounter.              Passed - Medication is active on med list        Passed - Patient is age 18 or older        Passed - No active pregnancy on record        Passed - No positive pregnancy test in past 12 months          "

## 2019-08-30 PROBLEM — M25.512 LEFT SHOULDER PAIN, UNSPECIFIED CHRONICITY: Status: RESOLVED | Noted: 2018-11-26 | Resolved: 2019-08-30

## 2019-08-30 NOTE — PROGRESS NOTES
Discharge Note    Progress reporting period is from last progress note on   to Jan 11, 2019.    Love failed to follow up and current status is unknown.  Please see information below for last relevant information on current status.  Patient seen for 5 visits.    SUBJECTIVE  Subjective changes noted by patient:  Feeling better the past week.  .  Current pain level is  .     Previous pain level was  8/10.   Changes in function:  Yes (See Goal flowsheet attached for changes in current functional level)  Adverse reaction to treatment or activity: None    OBJECTIVE  Changes noted in objective findings: See flow.     ASSESSMENT/PLAN  Diagnosis: L shoulder pain   Updated problem list and treatment plan:   Pain - HEP  STG/LTGs have been met or progress has been made towards goals:  Yes, please see goal flowsheet for most current information  Assessment of Progress: current status is unknown.    Last current status: Pt is progressing well   Self Management Plans:  HEP  I have re-evaluated this patient and find that the nature, scope, duration and intensity of the therapy is appropriate for the medical condition of the patient.  Love continues to require the following intervention to meet STG and LTG's:  HEP.    Recommendations:  Discharge with current home program.  Patient to follow up with MD as needed.    Please refer to the daily flowsheet for treatment today, total treatment time and time spent performing 1:1 timed codes.

## 2019-09-25 ENCOUNTER — HOSPITAL ENCOUNTER (OUTPATIENT)
Dept: MAMMOGRAPHY | Facility: CLINIC | Age: 64
Discharge: HOME | End: 2019-09-25
Attending: FAMILY MEDICINE | Admitting: FAMILY MEDICINE
Payer: COMMERCIAL

## 2019-09-25 DIAGNOSIS — Z12.39 SCREENING FOR BREAST CANCER: ICD-10-CM

## 2019-09-25 PROCEDURE — 77067 SCR MAMMO BI INCL CAD: CPT

## 2019-10-03 ENCOUNTER — ALLIED HEALTH/NURSE VISIT (OUTPATIENT)
Dept: NURSING | Facility: CLINIC | Age: 64
End: 2019-10-03
Payer: COMMERCIAL

## 2019-10-03 DIAGNOSIS — Z23 NEED FOR PROPHYLACTIC VACCINATION AND INOCULATION AGAINST INFLUENZA: Primary | ICD-10-CM

## 2019-10-03 PROCEDURE — 90662 IIV NO PRSV INCREASED AG IM: CPT

## 2019-10-03 PROCEDURE — 99207 ZZC NO CHARGE NURSE ONLY: CPT

## 2019-10-03 PROCEDURE — G0008 ADMIN INFLUENZA VIRUS VAC: HCPCS

## 2019-11-08 ENCOUNTER — HEALTH MAINTENANCE LETTER (OUTPATIENT)
Age: 64
End: 2019-11-08

## 2019-11-18 DIAGNOSIS — I10 HYPERTENSION GOAL BP (BLOOD PRESSURE) < 140/90: ICD-10-CM

## 2019-11-18 DIAGNOSIS — I10 BENIGN ESSENTIAL HYPERTENSION: ICD-10-CM

## 2019-11-18 NOTE — TELEPHONE ENCOUNTER
"Requested Prescriptions   Pending Prescriptions Disp Refills     metoprolol succinate ER (TOPROL-XL) 50 MG 24 hr tablet [Pharmacy Med Name: Metoprolol Succinate ER Oral Tablet Extended Release 24 Hour 50 MG] 135 tablet 0     Sig: take 1 & 1/2 tablets by mouth daily  Last Written Prescription Date:  5/20/19  Last Fill Quantity: 135,  # refills: 1   Last office visit: 5/20/2019 with prescribing provider:  Harsh   Future Office Visit:           Beta-Blockers Protocol Passed - 11/18/2019  8:50 AM        Passed - Blood pressure under 140/90 in past 12 months     BP Readings from Last 3 Encounters:   05/20/19 134/88   05/07/19 (!) 157/96   04/15/19 148/86                 Passed - Patient is age 6 or older        Passed - Recent (12 mo) or future (30 days) visit within the authorizing provider's specialty     Patient has had an office visit with the authorizing provider or a provider within the authorizing providers department within the previous 12 mos or has a future within next 30 days. See \"Patient Info\" tab in inbasket, or \"Choose Columns\" in Meds & Orders section of the refill encounter.              Passed - Medication is active on med list          "

## 2019-11-18 NOTE — LETTER
November 21, 2019      Love Pinon  [address] [address]        Dear Love,    I care about your health and have reviewed your health plan. I have reviewed your medical conditions, medication list, and lab results and am making recommendations based on this review, to better manage your health.    You are in particular need of attention regarding:  -medication and labs    I am recommending that you:  -Schedule a fasting offive visit    Here is a list of Health Maintenance topics that are due now or due soon:  Health Maintenance Due   Topic Date Due     Zoster (Shingles) Vaccine (1 of 2) 03/18/2004     FALL RISK ASSESSMENT  03/18/2019       Please call us at [phone] (or use [employer]) to address the above recommendations.     Thank you for trusting [facility] and we appreciate the opportunity to serve you.  We look forward to supporting your healthcare needs in the future.    Healthy Regards,    Bri House MD

## 2019-11-19 RX ORDER — METOPROLOL SUCCINATE 50 MG/1
TABLET, EXTENDED RELEASE ORAL
Qty: 135 TABLET | Refills: 0 | Status: SHIPPED | OUTPATIENT
Start: 2019-11-19 | End: 2020-02-17

## 2019-11-19 NOTE — TELEPHONE ENCOUNTER
Patient due for fasting office visit- one refill given.  Routing to team to schedule appointment     Instructions         Return in about 6 months (around 11/20/2019), or sooner if problem , for BP Recheck/ med check .   Check labs   Take medications as directed.  Cares and symptomatic cares discussed   Follow up if problem or concern             Carina PIPER RN  [facility]  [phone]

## 2019-11-19 NOTE — TELEPHONE ENCOUNTER
Norma Melissa contacted [place] on 11/19/19 and left a message. If patient calls back please schedule appointment as soon as possible for a fasting ov.      .Norma GREY    [facility] [place]

## 2019-11-20 NOTE — TELEPHONE ENCOUNTER
Norma Melissa contacted [place] on 11/20/19 and left a message. If patient calls back please schedule appointment as soon as possible for a fasting ov.        .Norma GREY    [facility] [place]

## 2019-11-21 NOTE — TELEPHONE ENCOUNTER
Norma Melissa contacted [place] on 11/21/19 and left a message. If patient calls back please schedule appointment as soon as possible for a fasting ov. Letter sent.        .Norma GREY    [facility] [place]

## 2019-12-19 ENCOUNTER — OFFICE VISIT (OUTPATIENT)
Dept: FAMILY MEDICINE | Facility: CLINIC | Age: 64
End: 2019-12-19
Payer: COMMERCIAL

## 2019-12-19 VITALS
BODY MASS INDEX: 25.39 KG/M2 | DIASTOLIC BLOOD PRESSURE: 80 MMHG | TEMPERATURE: 97.1 F | HEART RATE: 74 BPM | WEIGHT: 158 LBS | OXYGEN SATURATION: 100 % | HEIGHT: 66 IN | SYSTOLIC BLOOD PRESSURE: 130 MMHG

## 2019-12-19 DIAGNOSIS — J02.0 STREPTOCOCCAL SORE THROAT: ICD-10-CM

## 2019-12-19 DIAGNOSIS — J02.9 SORE THROAT: Primary | ICD-10-CM

## 2019-12-19 LAB
DEPRECATED S PYO AG THROAT QL EIA: ABNORMAL
SPECIMEN SOURCE: ABNORMAL

## 2019-12-19 PROCEDURE — 99213 OFFICE O/P EST LOW 20 MIN: CPT | Performed by: PHYSICIAN ASSISTANT

## 2019-12-19 PROCEDURE — 87880 STREP A ASSAY W/OPTIC: CPT | Performed by: PHYSICIAN ASSISTANT

## 2019-12-19 RX ORDER — AZITHROMYCIN 250 MG/1
TABLET, FILM COATED ORAL
Qty: 6 TABLET | Refills: 0 | Status: SHIPPED | OUTPATIENT
Start: 2019-12-19 | End: 2020-06-17

## 2019-12-19 ASSESSMENT — MIFFLIN-ST. JEOR: SCORE: 1278.43

## 2019-12-19 NOTE — PROGRESS NOTES
Subjective     Love Pinon is a 64 year old female who presents to clinic today for the following health issues:    HPI   RESPIRATORY SYMPTOMS      Duration: x Monday     Description  rhinorrhea, sore throat, cough and hoarse voice, HA     Severity: moderate    Accompanying signs and symptoms: None    History (predisposing factors):  none    Precipitating or alleviating factors: None    Therapies tried and outcome:  Airborne         Patient Active Problem List   Diagnosis     HYPERLIPIDEMIA LDL GOAL <130     Advance care planning     BPPV (benign paroxysmal positional vertigo)     Migraine headache without aura     Hypertension goal BP (blood pressure) < 140/90     Seizure disorder (H)     Osteoporosis     Chronic idiopathic constipation     Hyponatremia     Screening for breast cancer     Rotator cuff syndrome of left shoulder     Nodular goiter     Cervical cancer screening     Thyroid nodule     Benign essential hypertension     Past Surgical History:   Procedure Laterality Date     APPENDECTOMY       CL AFF SURGICAL PATHOLOGY  1970    with cryotherapy     COLONOSCOPY      polyp excision, repeat  5 years      COLONOSCOPY N/A 9/15/2016    3 adenoma polyps, repeat in 3 years      D & C       HYSTEROSCOPY       TUBAL LIGATION         Social History     Tobacco Use     Smoking status: Former Smoker     Years: 18.00     Types: Cigarettes     Last attempt to quit: 1989     Years since quittin.9     Smokeless tobacco: Never Used   Substance Use Topics     Alcohol use: Yes     Alcohol/week: 3.0 standard drinks     Types: 3 Standard drinks or equivalent per week     Comment: 2/week     Family History   Problem Relation Age of Onset     Cardiovascular Paternal Grandfather          of heart attack     Hypertension Father      Lipids Father      Cancer Father         MELANOMA     Genitourinary Problems Mother      Cancer Paternal Grandmother      Asthma Sister      Asthma Sister       "    Current Outpatient Medications   Medication Sig Dispense Refill     azithromycin (ZITHROMAX) 250 MG tablet Two tablets first day, then one tablet daily for four days. 6 tablet 0     calcium carbonate-vitamin D (CALCIUM + D) 600-200 MG-UNIT TABS Take 1 tablet by mouth daily 60 tablet 0     Cholecalciferol (VITAMIN D-3 PO)        Cranberry 500 MG CAPS Take  by mouth. Daily         fexofenadine (ALLEGRA) 180 MG tablet Take 180 mg by mouth daily       FISH OIL 1000 MG OR CAPS Twice daily       IBANdronate (BONIVA) 150 MG tablet Take 1 tablet (150 mg) by mouth every 30 days 3 tablet 3     metoprolol succinate ER (TOPROL-XL) 50 MG 24 hr tablet take 1 & 1/2 tablets by mouth daily 135 tablet 0     senna-docusate (SENOKOT-S;PERICOLACE) 8.6-50 MG per tablet Take 1 tablet by mouth daily 28 tablet 0     simvastatin (ZOCOR) 40 MG tablet Take 1 tablet (40 mg) by mouth At Bedtime 90 tablet 3     topiramate (TOPAMAX) 25 MG tablet Take 2 tablets (50 mg) by mouth 2 times daily 120 tablet 1     Allergies   Allergen Reactions     Aramine [Metaraminol Bitartrate]      Penicillins      Hives, swelling       Reviewed and updated as needed this visit by Provider         Review of Systems   ROS COMP: Constitutional, HEENT, cardiovascular, pulmonary, gi and gu systems are negative, except as otherwise noted.      Objective    /80   Pulse 74   Temp 97.1  F (36.2  C) (Tympanic)   Ht 1.676 m (5' 6\")   Wt 71.7 kg (158 lb)   SpO2 100%   BMI 25.50 kg/m    Body mass index is 25.5 kg/m .  Physical Exam   GENERAL: healthy, alert and no distress  EYES: Eyes grossly normal to inspection, PERRL and conjunctivae and sclerae normal  HENT: ear canals and TM's normal, nose and mouth without ulcers or lesions, tonsillar pillars with mild erythema, no exudates  NECK: no adenopathy  RESP: lungs clear to auscultation - no rales, rhonchi or wheezes  CV: regular rate and rhythm, normal S1 S2, no S3 or S4, no murmur, click or rub  Diagnostic Test " "Results:  Labs reviewed in Epic    .  Results for orders placed or performed in visit on 12/19/19   Strep, Rapid Screen     Status: Abnormal   Result Value Ref Range    Specimen Description Throat     Rapid Strep A Screen (A)      POSITIVE: Group A Streptococcal antigen detected by immunoassay.         Assessment & Plan     1. Sore throat  - Strep, Rapid Screen    2. Streptococcal sore throat  Due to penicillin allergy, recommend azithromycin at this time  romycin (ZITHROMAX) 250 MG tablet; Two tablets first day, then one tablet daily for four days.  Dispense: 6 tablet; Refill: 0     BMI:   Estimated body mass index is 25.5 kg/m  as calculated from the following:    Height as of this encounter: 1.676 m (5' 6\").    Weight as of this encounter: 71.7 kg (158 lb).         Follow up if new or persistent symptoms    No follow-ups on file.    Finn Perez PA-C  [facility]    "

## 2020-02-17 ENCOUNTER — MYC REFILL (OUTPATIENT)
Dept: FAMILY MEDICINE | Facility: CLINIC | Age: 65
End: 2020-02-17

## 2020-02-17 DIAGNOSIS — I10 HYPERTENSION GOAL BP (BLOOD PRESSURE) < 140/90: ICD-10-CM

## 2020-02-17 DIAGNOSIS — I10 BENIGN ESSENTIAL HYPERTENSION: ICD-10-CM

## 2020-02-17 RX ORDER — METOPROLOL SUCCINATE 50 MG/1
TABLET, EXTENDED RELEASE ORAL
Qty: 135 TABLET | Refills: 0 | Status: SHIPPED | OUTPATIENT
Start: 2020-02-17 | End: 2020-05-18

## 2020-02-17 NOTE — TELEPHONE ENCOUNTER
"Last Written Prescription Date:  11/19/19  Last Fill Quantity: 135 tablets,  # refills: 0   Last office visit: 12/19/2019 with prescribing provider: Ana   Future Office Visit:      Requested Prescriptions   Pending Prescriptions Disp Refills     metoprolol succinate ER (TOPROL-XL) 50 MG 24 hr tablet 135 tablet 0       Beta-Blockers Protocol Passed - 2/17/2020 11:08 AM        Passed - Blood pressure under 140/90 in past 12 months     BP Readings from Last 3 Encounters:   12/19/19 130/80   05/20/19 134/88   05/07/19 (!) 157/96                 Passed - Patient is age 6 or older        Passed - Recent (12 mo) or future (30 days) visit within the authorizing provider's specialty     Patient has had an office visit with the authorizing provider or a provider within the authorizing providers department within the previous 12 mos or has a future within next 30 days. See \"Patient Info\" tab in inbasket, or \"Choose Columns\" in Meds & Orders section of the refill encounter.              Passed - Medication is active on med list      \    "

## 2020-02-17 NOTE — TELEPHONE ENCOUNTER
Prescription approved per [facility] Refill Protocol.    Alejandra Zapata RN, BSN  [facility]

## 2020-06-13 DIAGNOSIS — E78.5 HYPERLIPIDEMIA LDL GOAL <130: ICD-10-CM

## 2020-06-15 RX ORDER — SIMVASTATIN 40 MG
TABLET ORAL
Qty: 30 TABLET | Refills: 0 | Status: SHIPPED | OUTPATIENT
Start: 2020-06-15 | End: 2020-06-17

## 2020-06-15 NOTE — TELEPHONE ENCOUNTER
Script refill faxed. Remind pt to do follow up for med check and labs, since she is due. vide visit ok

## 2020-06-15 NOTE — TELEPHONE ENCOUNTER
Routing refill request to provider for review/approval because:  Labs not current:  Lipid  Dayami Joshua RN   [facility] - Triage

## 2020-06-16 NOTE — TELEPHONE ENCOUNTER
Routing to team to inform and assist with scheduling. Thank you very much.     Alejandra Zapata RN, BSN  [facility] [place]

## 2020-06-17 ENCOUNTER — VIRTUAL VISIT (OUTPATIENT)
Dept: FAMILY MEDICINE | Facility: CLINIC | Age: 65
End: 2020-06-17
Payer: COMMERCIAL
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
## 2020-06-17 DIAGNOSIS — I10 BENIGN ESSENTIAL HYPERTENSION: ICD-10-CM

## 2020-06-17 DIAGNOSIS — E78.5 HYPERLIPIDEMIA LDL GOAL <130: ICD-10-CM

## 2020-06-17 DIAGNOSIS — M75.102 ROTATOR CUFF SYNDROME OF LEFT SHOULDER: ICD-10-CM

## 2020-06-17 PROCEDURE — 99214 OFFICE O/P EST MOD 30 MIN: CPT | Mod: 95 | Performed by: FAMILY MEDICINE

## 2020-06-17 RX ORDER — METOPROLOL SUCCINATE 50 MG/1
TABLET, EXTENDED RELEASE ORAL
Qty: 135 TABLET | Refills: 1 | Status: SHIPPED | OUTPATIENT
Start: 2020-06-17 | End: 2020-11-16

## 2020-06-17 RX ORDER — SIMVASTATIN 40 MG
40 TABLET ORAL AT BEDTIME
Qty: 90 TABLET | Refills: 3 | Status: SHIPPED | OUTPATIENT
Start: 2020-06-17 | End: 2020-07-07

## 2020-06-17 RX ORDER — DOXYCYCLINE 100 MG/1
100 TABLET ORAL 2 TIMES DAILY
Qty: 20 TABLET | Refills: 0 | Status: SHIPPED | OUTPATIENT
Start: 2020-06-17 | End: 2020-07-07

## 2020-06-17 NOTE — PROGRESS NOTES
"Love Pinon is a 65 year old female who is being evaluated via a billable video visit.      The patient has been notified of following:     \"This video visit will be conducted via a call between you and your physician/provider. We have found that certain health care needs can be provided without the need for an in-person physical exam.  This service lets us provide the care you need with a video conversation.  If a prescription is necessary we can send it directly to your pharmacy.  If lab work is needed we can place an order for that and you can then stop by our lab to have the test done at a later time.    Video visits are billed at different rates depending on your insurance coverage.  Please reach out to your insurance provider with any questions.    If during the course of the call the physician/provider feels a video visit is not appropriate, you will not be charged for this service.\"    Patient has given verbal consent for Video visit? Yes    Will anyone else be joining your video visit? No      Subjective     Love Pinon is a 65 year old female who presents today via video visit for the following health issues:    HPI   Medication Followup of simvastatin / Toprol - xl     Taking Medication as prescribed: yes    Side Effects:  None    Medication Helping Symptoms:  yes   Video Start Time: 9:04 AM          Hyperlipidemia Follow-Up      Are you regularly taking any medication or supplement to lower your cholesterol?   Yes- simvastatin     Are you having muscle aches or other side effects that you think could be caused by your cholesterol lowering medication?  No      How many servings of fruits and vegetables do you eat daily?  2-3    On average, how many sweetened beverages do you drink each day (Examples: soda, juice, sweet tea, etc.  Do NOT count diet or artificially sweetened beverages)?   0    How many days per week do you exercise enough to make your heart beat faster? 5    How many " minutes a day do you exercise enough to make your heart beat faster? 30 - 60    How many days per week do you miss taking your medication? 0    Hypertension Follow-up      Do you check your blood pressure regularly outside of the clinic? Yes     Are you following a low salt diet? Yes    Are your blood pressures ever more than 140 on the top number (systolic) OR more   than 90 on the bottom number (diastolic), for example 140/90? Yes          PROBLEMS TO ADD ON...  Joint or Musculoskeletal Pain  Duration of complaint: left shoulder  X 2 months, although has had problem on and off for few years related to tendonitis/ frozen shoulder     Description:   Location: left shoulder  Character: Sharp and Dull ache  Intensity: moderate  Progression of Symptoms: worse  Accompanying Signs & Symptoms: Other symptoms:  No radiation of pain to  numbness, tingling , but has frozen shoulder   History: Previous similar pain: YES    Precipitating factors: Trauma or overuse: YES  Alleviating factors: Improved by: stretching, exercises help some but not making progress , Ibuprofen not helping enough and physical therapy, last year did not help , so now scheduled to see ortho in few weeks again for injection   Therapies Tried and outcome: injection has helped previously   Rash  Duration of complaint: tick bit on her thigh , noted tick yesterday but it may have been there for couple of days bc she was outdoor.       Description: Has red spot at the tick bite side but now also has mild  redness around it ,  feels warm no significant pain    Character: round  Itching (Pruritis): no   Progression of Symptoms:  same  Accompanying Signs & Symptoms:  Fever: no   Body aches or joint pain: no   Fatigue  symptoms: no   Recent cold or URI / GI  symptoms: no   History:   Previous similar rash: no   Precipitating factors:   Exposure to similar rash: YES tick bite   Recent travel: no   Alleviating factors: na   Therapies Tried and outcome: none  "      Patient Active Problem List   Diagnosis     HYPERLIPIDEMIA LDL GOAL <130     Advance care planning     BPPV (benign paroxysmal positional vertigo)     Migraine headache without aura     Hypertension goal BP (blood pressure) < 140/90     Seizure disorder (H)     Osteoporosis     Chronic idiopathic constipation     Hyponatremia     Screening for breast cancer     Rotator cuff syndrome of left shoulder     Nodular goiter     Cervical cancer screening     Thyroid nodule     Benign essential hypertension     Past Surgical History:   Procedure Laterality Date     APPENDECTOMY       CL AFF SURGICAL PATHOLOGY  1970    with cryotherapy     COLONOSCOPY  2005    polyp excision, repeat  5 years      COLONOSCOPY N/A 9/15/2016    3 adenoma polyps, repeat in 3 years      D & C       HYSTEROSCOPY       TUBAL LIGATION         Social History     Tobacco Use     Smoking status: Former Smoker     Years: 18.00     Types: Cigarettes     Last attempt to quit: 1989     Years since quittin.4     Smokeless tobacco: Never Used   Substance Use Topics     Alcohol use: Yes     Alcohol/week: 3.0 standard drinks     Types: 3 Standard drinks or equivalent per week     Comment: 2/week     Family History   Problem Relation Age of Onset     Cardiovascular Paternal Grandfather          of heart attack     Hypertension Father      Lipids Father      Cancer Father         MELANOMA     Genitourinary Problems Mother      Cancer Paternal Grandmother      Asthma Sister      Asthma Sister            Reviewed and updated as needed this visit by Provider         Review of Systems   Constitutional, HEENT, cardiovascular, pulmonary, GI, , musculoskeletal, neuro, skin, endocrine and psych systems are negative, except as otherwise noted.      Objective    There were no vitals taken for this visit.  Estimated body mass index is 25.5 kg/m  as calculated from the following:    Height as of 19: 1.676 m (5' 6\").    Weight as of 19: " 71.7 kg (158 lb).  Physical Exam     GENERAL: Healthy, alert and no distress  EYES: Eyes grossly normal to inspection.  No discharge or erythema, or obvious scleral/conjunctival abnormalities.  RESP: breathing looks comfortable. No audible wheeze, cough.  No visible retractions or increased work of breathing.    MS:  Neck and left shoulder has decreased range of motion  And she reports some  tenderness to palpation back of shoulder and trapezius area an around shoulder joint  . She reports to no left chest wall tenderness , no obvious erythema, swelling or deformity noted otherwise   SKIN:has small red area of tick bite but also has mild palm size faint erythema around it  - lower legs  thigh area   NEURO:  grossly intact.  Mentation and speech appropriate for age.  PSYCH: Mentation appears normal, affect normal/bright, judgement and insight intact, normal speech and appearance well-groomed.          Assessment & Plan     (E78.5) Hyperlipidemia LDL goal <130  (primary encounter diagnosis)  Comment:   Plan: **Comprehensive metabolic panel FUTURE anytime,        Lipid panel reflex to direct LDL Fasting,         simvastatin (ZOCOR) 40 MG tablet            (I10) Benign essential hypertension  Comment:   Plan: **Comprehensive metabolic panel FUTURE anytime,        Lipid panel reflex to direct LDL Fasting,         metoprolol succinate ER (TOPROL-XL) 50 MG 24 hr        tablet            (M75.102) Rotator cuff syndrome of left shoulder  Comment: frozen shoulder  Plan: diclofenac (VOLTAREN) 50 MG EC tablet          (W57.XXXA) Tick bite, initial encounter  (primary encounter diagnosis)  Comment: possible mild cellulitis around   Plan: doxycycline monohydrate (ADOXA) 100 MG tablet,         Lyme Confirm IgG by Immunoblot          Cares and  treatment discussed. She wish to proceed with lyme's test since she is coming for labs  . She will then also start doxycycline to cover both for potential  lyme's risk and cellulitis.   Skin  "Cares and symptomatic treatment discussed follow up if problem       She will schedule for  Fasting labs. refill sent.Cares and  treatment discussed. follow up if problem   Patient expressed understanding and agreement with treatment plan. All patient's questions were answered, will let me know if has more later.  Medications: Rx's: Reviewed the potential side effects/complications of medications prescribed.        BMI:   Estimated body mass index is 25.5 kg/m  as calculated from the following:    Height as of 12/19/19: 1.676 m (5' 6\").    Weight as of 12/19/19: 71.7 kg (158 lb).         Return in about 2 months (around 8/17/2020) for Lab Work, Physical Exam.    Bri House MD  [facility]      Video-Visit Details    Type of service:  Video Visit    Video End Time:9:30 AM    Originating Location (pt. Location): Other she was at work     Distant Location (provider location):  [facility]     Platform used for Video Visit: Anjelica House MD        "

## 2020-06-17 NOTE — PATIENT INSTRUCTIONS
schedule ab appointment   Take medications as directed.  Cares and symptomatic cares discussed   Follow up if problem or concern

## 2020-06-18 DIAGNOSIS — E78.5 HYPERLIPIDEMIA LDL GOAL <130: ICD-10-CM

## 2020-06-18 DIAGNOSIS — I10 BENIGN ESSENTIAL HYPERTENSION: ICD-10-CM
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
## 2020-06-18 LAB
ALBUMIN SERPL-MCNC: 3.9 G/DL (ref 3.4–5)
ALP SERPL-CCNC: 81 U/L (ref 40–150)
ALT SERPL W P-5'-P-CCNC: 26 U/L (ref 0–50)
ANION GAP SERPL CALCULATED.3IONS-SCNC: 6 MMOL/L (ref 3–14)
AST SERPL W P-5'-P-CCNC: 22 U/L (ref 0–45)
BILIRUB SERPL-MCNC: 0.4 MG/DL (ref 0.2–1.3)
BUN SERPL-MCNC: 10 MG/DL (ref 7–30)
CALCIUM SERPL-MCNC: 9.3 MG/DL (ref 8.5–10.1)
CHLORIDE SERPL-SCNC: 110 MMOL/L (ref 94–109)
CHOLEST SERPL-MCNC: 183 MG/DL
CO2 SERPL-SCNC: 23 MMOL/L (ref 20–32)
CREAT SERPL-MCNC: 0.85 MG/DL (ref 0.52–1.04)
GFR SERPL CREATININE-BSD FRML MDRD: 71 ML/MIN/{1.73_M2}
GLUCOSE SERPL-MCNC: 99 MG/DL (ref 70–99)
HDLC SERPL-MCNC: 80 MG/DL
LDLC SERPL CALC-MCNC: 84 MG/DL
NONHDLC SERPL-MCNC: 103 MG/DL
POTASSIUM SERPL-SCNC: 4.3 MMOL/L (ref 3.4–5.3)
PROT SERPL-MCNC: 7.9 G/DL (ref 6.8–8.8)
SODIUM SERPL-SCNC: 139 MMOL/L (ref 133–144)
TRIGL SERPL-MCNC: 96 MG/DL

## 2020-06-18 PROCEDURE — 80053 COMPREHEN METABOLIC PANEL: CPT | Performed by: FAMILY MEDICINE

## 2020-06-18 PROCEDURE — 80061 LIPID PANEL: CPT | Performed by: FAMILY MEDICINE

## 2020-06-18 PROCEDURE — 99000 SPECIMEN HANDLING OFFICE-LAB: CPT | Performed by: FAMILY MEDICINE

## 2020-06-18 PROCEDURE — 86617 LYME DISEASE ANTIBODY: CPT | Mod: 90 | Performed by: FAMILY MEDICINE

## 2020-06-18 PROCEDURE — 36415 COLL VENOUS BLD VENIPUNCTURE: CPT | Performed by: FAMILY MEDICINE

## 2020-06-21 LAB — B BURGDOR IGG SER QL IB: NEGATIVE

## 2020-07-07 ENCOUNTER — OFFICE VISIT (OUTPATIENT)
Dept: ORTHOPEDICS | Facility: CLINIC | Age: 65
End: 2020-07-07
Payer: COMMERCIAL

## 2020-07-07 VITALS — BODY MASS INDEX: 25.39 KG/M2 | HEIGHT: 66 IN | WEIGHT: 158 LBS

## 2020-07-07 DIAGNOSIS — M75.02 ADHESIVE CAPSULITIS OF LEFT SHOULDER: Primary | ICD-10-CM

## 2020-07-07 PROBLEM — M75.102 ROTATOR CUFF SYNDROME OF LEFT SHOULDER: Status: RESOLVED | Noted: 2019-02-22 | Resolved: 2020-07-07

## 2020-07-07 PROCEDURE — 99214 OFFICE O/P EST MOD 30 MIN: CPT | Performed by: FAMILY MEDICINE

## 2020-07-07 RX ORDER — TOPIRAMATE 50 MG/1
50 TABLET, FILM COATED ORAL 2 TIMES DAILY
COMMUNITY
End: 2024-07-16

## 2020-07-07 RX ORDER — TRAMADOL HYDROCHLORIDE 50 MG/1
50 TABLET ORAL 2 TIMES DAILY PRN
Qty: 12 TABLET | Refills: 0 | Status: SHIPPED | OUTPATIENT
Start: 2020-07-07 | End: 2020-11-19

## 2020-07-07 ASSESSMENT — ENCOUNTER SYMPTOMS
FOCAL WEAKNESS: 0
TINGLING: 0
SENSORY CHANGE: 0

## 2020-07-07 ASSESSMENT — MIFFLIN-ST. JEOR: SCORE: 1273.43

## 2020-07-07 NOTE — LETTER
7/7/2020         RE: Love Pinon  [address] [facility] [address]        Dear Colleague,    Thank you for referring your patient, Love Pinon, to the  SPORTS MEDICINE. Please see a copy of my visit note below.    Musculoskeletal Problem        [facility]   Follow-up Visit s Jul 7, 2020    PCP: Bri House      Subjective:  Love is a 65 year old female who is seen in follow up for evaluation of   Chief Complaint   Patient presents with     Left Shoulder - Pain     Her last visit was on 2/22/19.  Since that time, symptoms have been worse than before.       Patient had a left shoulder: subacromial space Cortisone injection on 2/22/19 that provided 90% relief, lasting for about a year  Felt s/s start back up in March, once that happened she started her therapy exercises and now she isn't able to anymore.   Patient denies any new injuries.    Patient's past medical, surgical, social and family histories are reviewed today.    History from previous visit on Visit date not found      Injury: Reports insidious onset without acute precipitating event. Came on after a flu shot. Prior to that, no pain.      Right hand dominant    Location of Pain: left shoulder posterior and lateral, nonradiating , progressively worse.   Duration of Pain: 6 month(s)  Rating of Pain at worst: 8/10  Rating of Pain Currently: 6/10  Pain is better with: activity avoidance, heat  Pain is worse with: self care and sleeping  Treatment so far consists of: physical therapy, ice   Associated symptoms: no distal numbness or tingling; denies swelling or warmth  Recent imaging completed: X-rays completed nov 2018.  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[email or web address], [employer]      Past Medical History:   Diagnosis Date     BPPV (benign paroxysmal positional vertigo) 10/26/2012     Herpes genitalia      Hyperlipidemia      Hypertension       Iron deficiency anemia secondary to blood loss (chronic)     menorrhagia     Migraine headache without aura      Seizure disorder (H)     one seizure-grandmal in 1989       Patient Active Problem List    Diagnosis Date Noted     Benign essential hypertension 05/20/2019     Priority: Medium     Thyroid nodule 04/15/2019     Priority: Medium     Cervical cancer screening 02/28/2019     Priority: Medium     Pt needs 2 Normal Pap/Neg HPV results or 3 Normal paps within the past 10 years, with the last one being in the last 5 years, before pap screening can be discontinued.    Pt's age: 63 will be 64 on 03/18/19.  Normal paps in   2009 NIL, 2011 NIL, 2012 NIL, 2016 NIL and 02/20/19: NIL pap, Neg HR HPV result.   No history of SURI 2 or greater found in epic.   No further cervical cancer screening recommended per provider.   Hm updated.                Nodular goiter 02/26/2019     Priority: Medium     Multiple complex/ solid nodule, pt asymptomatic        Rotator cuff syndrome of left shoulder 02/22/2019     Priority: Medium     Screening for breast cancer 02/20/2019     Priority: Medium     Hyponatremia 02/22/2018     Priority: Medium     Osteoporosis 02/22/2017     Priority: Medium     Chronic idiopathic constipation 02/22/2017     Priority: Medium     Hypertension goal BP (blood pressure) < 140/90 04/30/2014     Priority: Medium     Migraine headache without aura      Priority: Medium     Seizure disorder (H)      Priority: Medium     one seizure-grandmal in 1989       BPPV (benign paroxysmal positional vertigo) 10/26/2012     Priority: Medium     Advance care planning 09/01/2011     Priority: Medium     Advance Care Planning 2/17/2016: Receipt of ACP document:  Received: Health Care Directive which was witnessed or notarized on 09/29/2013.  Document not previously scanned.  Validation form completed and sent with document to be scanned.  Code Status needs to be updated to reflect choices in most recent ACP document.   Confirmed/documented designated decision maker(s).  Added by Linda Delgado  Advance Care Planning 11:  Discussed advance care planning with patient; information given to patient to review. Sherrie Schmitt CMA            HYPERLIPIDEMIA LDL GOAL <130 10/31/2010     Priority: Medium       Family History   Problem Relation Age of Onset     Cardiovascular Paternal Grandfather          of heart attack     Hypertension Father      Lipids Father      Cancer Father         MELANOMA     Genitourinary Problems Mother      Cancer Paternal Grandmother      Asthma Sister      Asthma Sister        Social History     Socioeconomic History     Marital status: Single     Spouse name:      Number of children: 2     Years of education: Not on file     Highest education level: Not on file   Occupational History     Occupation: sales      Employer: [employer]     Comment: [employer]   Social Needs     Financial resource strain: Not on file     Food insecurity:     Worry: Not on file     Inability: Not on file     Transportation needs:     Medical: Not on file     Non-medical: Not on file   Tobacco Use     Smoking status: Former Smoker     Years: 18.00     Types: Cigarettes     Last attempt to quit: 1989     Years since quittin.1     Smokeless tobacco: Never Used   Substance and Sexual Activity     Alcohol use: Yes     Alcohol/week: 1.8 oz     Types: 3 Standard drinks or equivalent per week     Comment: 2/week       Past Surgical History:   Procedure Laterality Date     APPENDECTOMY  2007     CL AFF SURGICAL PATHOLOGY  1970    with cryotherapy     COLONOSCOPY  2005    polyp excision, repeat  5 years      COLONOSCOPY N/A 9/15/2016    3 adenoma polyps, repeat in 3 years      D & C       HYSTEROSCOPY       TUBAL LIGATION             Review of Systems   Musculoskeletal: Positive for joint pain.   Neurological: Negative for tingling, sensory change and focal weakness.   All other systems  "reviewed and are negative.        Physical Exam  Ht 1.676 m (5' 6\")   Wt 71.7 kg (158 lb)   BMI 25.50 kg/m    Constitutional:well-developed, well-nourished, and in no distress.   Cardiovascular: Intact distal pulses.    Neurological: alert. Gait Normal:   Gait, station, stance, and balance appear normal for age  Skin: Skin is warm and dry.   Psychiatric: Mood and affect normal.   Respiratory: unlabored, speaks in full sentences  Lymph: no LAD, no lymphangitis            Left Shoulder Exam     Tenderness   The patient is experiencing no tenderness.     Range of Motion   Active abduction:  90 abnormal   Passive abduction:  90 abnormal   External rotation:  60 abnormal   Forward flexion:  90 abnormal   Internal rotation 0 degrees: Sacrum     Muscle Strength   Abduction: 5/5   Internal rotation: 5/5   External rotation: 5/5   Supraspinatus: 5/5   Subscapularis: 5/5   Biceps: 5/5     Tests   Apprehension: negative  Nick test: positive  Cross arm: positive  Impingement: positive  Drop arm: positive  Sulcus: absent    Other   Erythema: absent  Sensation: normal  Pulse: present                  X-ray images Previously done and independently reviewed by me in the office today with the patient. X-ray shows:   Normal    LEFT SHOULDER THREE VIEWS  11/20/2018 12:11 PM      HISTORY: Ongoing shoulder pain, hurts with range of motion. Left  shoulder pain, unspecified chronicity.     COMPARISON: None.                                                                      IMPRESSION: Normal.      DAMION SALAZAR MD    ASSESSMENT/PLAN    ICD-10-CM    1. Adhesive capsulitis of left shoulder  M75.02 traMADol (ULTRAM) 50 MG tablet     Pain pattern and limited range of motion today consistent with frozen shoulder.  Nature syndrome discussed with the patient.  Subacromial injection unlikely to be effective.  Offered ultrasound-guided glenohumeral cortisone injection at a later date of her convenience and she agreed to proceed.  Okay for " short-term use of tramadol to assist with pain preventing sleep over the next few nights.  Consider return to physical therapy.            Again, thank you for allowing me to participate in the care of your patient.        Sincerely,        Rafi De Santiago MD

## 2020-07-07 NOTE — PROGRESS NOTES
Musculoskeletal Problem        [facility]   Follow-up Visit s Jul 7, 2020    PCP: Bri House      Subjective:  Love is a 65 year old female who is seen in follow up for evaluation of   Chief Complaint   Patient presents with     Left Shoulder - Pain     Her last visit was on 2/22/19.  Since that time, symptoms have been worse than before.       Patient had a left shoulder: subacromial space Cortisone injection on 2/22/19 that provided 90% relief, lasting for about a year  Felt s/s start back up in March, once that happened she started her therapy exercises and now she isn't able to anymore.   Patient denies any new injuries.    Patient's past medical, surgical, social and family histories are reviewed today.    History from previous visit on Visit date not found      Injury: Reports insidious onset without acute precipitating event. Came on after a flu shot. Prior to that, no pain.      Right hand dominant    Location of Pain: left shoulder posterior and lateral, nonradiating , progressively worse.   Duration of Pain: 6 month(s)  Rating of Pain at worst: 8/10  Rating of Pain Currently: 6/10  Pain is better with: activity avoidance, heat  Pain is worse with: self care and sleeping  Treatment so far consists of: physical therapy, ice   Associated symptoms: no distal numbness or tingling; denies swelling or warmth  Recent imaging completed: X-rays completed nov 2018.  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[employer]      Past Medical History:   Diagnosis Date     BPPV (benign paroxysmal positional vertigo) 10/26/2012     Herpes genitalia      Hyperlipidemia      Hypertension      Iron deficiency anemia secondary to blood loss (chronic)     menorrhagia     Migraine headache without aura      Seizure disorder (H)     one seizure-grandmal in 1989       Patient Active Problem List    Diagnosis Date Noted     Benign essential  hypertension 05/20/2019     Priority: Medium     Thyroid nodule 04/15/2019     Priority: Medium     Cervical cancer screening 02/28/2019     Priority: Medium     Pt needs 2 Normal Pap/Neg HPV results or 3 Normal paps within the past 10 years, with the last one being in the last 5 years, before pap screening can be discontinued.    Pt's age: 63 will be 64 on 03/18/19.  Normal paps in   2009 NIL, 2011 NIL, 2012 NIL, 2016 NIL and 02/20/19: NIL pap, Neg HR HPV result.   No history of SURI 2 or greater found in epic.   No further cervical cancer screening recommended per provider.   Hm updated.                Nodular goiter 02/26/2019     Priority: Medium     Multiple complex/ solid nodule, pt asymptomatic        Rotator cuff syndrome of left shoulder 02/22/2019     Priority: Medium     Screening for breast cancer 02/20/2019     Priority: Medium     Hyponatremia 02/22/2018     Priority: Medium     Osteoporosis 02/22/2017     Priority: Medium     Chronic idiopathic constipation 02/22/2017     Priority: Medium     Hypertension goal BP (blood pressure) < 140/90 04/30/2014     Priority: Medium     Migraine headache without aura      Priority: Medium     Seizure disorder (H)      Priority: Medium     one seizure-grandmal in 1989       BPPV (benign paroxysmal positional vertigo) 10/26/2012     Priority: Medium     Advance care planning 09/01/2011     Priority: Medium     Advance Care Planning 2/17/2016: Receipt of ACP document:  Received: Health Care Directive which was witnessed or notarized on 09/29/2013.  Document not previously scanned.  Validation form completed and sent with document to be scanned.  Code Status needs to be updated to reflect choices in most recent ACP document.  Confirmed/documented designated decision maker(s).  Added by Linda Delgado  Advance Care Planning 9/1/11:  Discussed advance care planning with patient; information given to patient to review. Sherrie Schmitt, DAVE            HYPERLIPIDEMIA  "LDL GOAL <130 10/31/2010     Priority: Medium       Family History   Problem Relation Age of Onset     Cardiovascular Paternal Grandfather          of heart attack     Hypertension Father      Lipids Father      Cancer Father         MELANOMA     Genitourinary Problems Mother      Cancer Paternal Grandmother      Asthma Sister      Asthma Sister        Social History     Socioeconomic History     Marital status: Single     Spouse name:      Number of children: 2     Years of education: Not on file     Highest education level: Not on file   Occupational History     Occupation: sales      Employer: [employer]     Comment: "[employer]   Social Needs     Financial resource strain: Not on file     Food insecurity:     Worry: Not on file     Inability: Not on file     Transportation needs:     Medical: Not on file     Non-medical: Not on file   Tobacco Use     Smoking status: Former Smoker     Years: 18.00     Types: Cigarettes     Last attempt to quit: 1989     Years since quittin.1     Smokeless tobacco: Never Used   Substance and Sexual Activity     Alcohol use: Yes     Alcohol/week: 1.8 oz     Types: 3 Standard drinks or equivalent per week     Comment: 2/week       Past Surgical History:   Procedure Laterality Date     APPENDECTOMY       CL AFF SURGICAL PATHOLOGY  1970    with cryotherapy     COLONOSCOPY  2005    polyp excision, repeat  5 years      COLONOSCOPY N/A 9/15/2016    3 adenoma polyps, repeat in 3 years      D & C       HYSTEROSCOPY       TUBAL LIGATION             Review of Systems   Musculoskeletal: Positive for joint pain.   Neurological: Negative for tingling, sensory change and focal weakness.   All other systems reviewed and are negative.        Physical Exam  Ht 1.676 m (5' 6\")   Wt 71.7 kg (158 lb)   BMI 25.50 kg/m    Constitutional:well-developed, well-nourished, and in no distress.   Cardiovascular: Intact distal pulses.    Neurological: alert. Gait " Normal:   Gait, station, stance, and balance appear normal for age  Skin: Skin is warm and dry.   Psychiatric: Mood and affect normal.   Respiratory: unlabored, speaks in full sentences  Lymph: no LAD, no lymphangitis            Left Shoulder Exam     Tenderness   The patient is experiencing no tenderness.     Range of Motion   Active abduction:  90 abnormal   Passive abduction:  90 abnormal   External rotation:  60 abnormal   Forward flexion:  90 abnormal   Internal rotation 0 degrees: Sacrum     Muscle Strength   Abduction: 5/5   Internal rotation: 5/5   External rotation: 5/5   Supraspinatus: 5/5   Subscapularis: 5/5   Biceps: 5/5     Tests   Apprehension: negative  Nick test: positive  Cross arm: positive  Impingement: positive  Drop arm: positive  Sulcus: absent    Other   Erythema: absent  Sensation: normal  Pulse: present                  X-ray images Previously done and independently reviewed by me in the office today with the patient. X-ray shows:   Normal    LEFT SHOULDER THREE VIEWS  11/20/2018 12:11 PM      HISTORY: Ongoing shoulder pain, hurts with range of motion. Left  shoulder pain, unspecified chronicity.     COMPARISON: None.                                                                      IMPRESSION: Normal.      DAMION SALAZAR MD    ASSESSMENT/PLAN    ICD-10-CM    1. Adhesive capsulitis of left shoulder  M75.02 traMADol (ULTRAM) 50 MG tablet     Pain pattern and limited range of motion today consistent with frozen shoulder.  Nature syndrome discussed with the patient.  Subacromial injection unlikely to be effective.  Offered ultrasound-guided glenohumeral cortisone injection at a later date of her convenience and she agreed to proceed.  Okay for short-term use of tramadol to assist with pain preventing sleep over the next few nights.  Consider return to physical therapy.

## 2020-07-14 ENCOUNTER — OFFICE VISIT (OUTPATIENT)
Dept: ORTHOPEDICS | Facility: CLINIC | Age: 65
End: 2020-07-14
Payer: COMMERCIAL

## 2020-07-14 DIAGNOSIS — M75.02 ADHESIVE CAPSULITIS OF LEFT SHOULDER: Primary | ICD-10-CM

## 2020-07-14 PROCEDURE — 20611 DRAIN/INJ JOINT/BURSA W/US: CPT | Mod: LT | Performed by: FAMILY MEDICINE

## 2020-07-14 RX ORDER — LIDOCAINE HYDROCHLORIDE 10 MG/ML
9 INJECTION, SOLUTION EPIDURAL; INFILTRATION; INTRACAUDAL; PERINEURAL
Status: SHIPPED | OUTPATIENT
Start: 2020-07-14

## 2020-07-14 RX ORDER — TRIAMCINOLONE ACETONIDE 40 MG/ML
40 INJECTION, SUSPENSION INTRA-ARTICULAR; INTRAMUSCULAR
Status: SHIPPED | OUTPATIENT
Start: 2020-07-14

## 2020-07-14 RX ADMIN — TRIAMCINOLONE ACETONIDE 40 MG: 40 INJECTION, SUSPENSION INTRA-ARTICULAR; INTRAMUSCULAR at 08:23

## 2020-07-14 RX ADMIN — LIDOCAINE HYDROCHLORIDE 9 ML: 10 INJECTION, SOLUTION EPIDURAL; INFILTRATION; INTRACAUDAL; PERINEURAL at 08:23

## 2020-07-14 NOTE — LETTER
7/14/2020         RE: Love Pinon  [address] [facility] [address]        Dear Colleague,    Thank you for referring your patient, Love Pinon, to the  SPORTS MEDICINE. Please see a copy of my visit note below.    Large Joint Injection/Arthocentesis: L glenohumeral joint    Date/Time: 7/14/2020 8:23 AM  Performed by: Rafi De Santiago MD  Authorized by: Rafi De Santiago MD     Indications:  Osteoarthritis  Needle Size:  22 G  Guidance: ultrasound    Approach:  Posterolateral  Location:  Shoulder      Site:  L glenohumeral joint  Medications:  40 mg triamcinolone 40 MG/ML; 9 mL lidocaine (PF) 1 %  Outcome:  Tolerated well, no immediate complications  Procedure discussed: discussed risks, benefits, and alternatives    Consent Given by:  Patient  Timeout: timeout called immediately prior to procedure    Prep: patient was prepped and draped in usual sterile fashion            Again, thank you for allowing me to participate in the care of your patient.        Sincerely,        Rafi De Santiago MD

## 2020-07-14 NOTE — PROGRESS NOTES
Large Joint Injection/Arthocentesis: L glenohumeral joint    Date/Time: 7/14/2020 8:23 AM  Performed by: Rafi De Santiago MD  Authorized by: Rafi De Santiago MD     Indications:  Osteoarthritis  Needle Size:  22 G  Guidance: ultrasound    Approach:  Posterolateral  Location:  Shoulder      Site:  L glenohumeral joint  Medications:  40 mg triamcinolone 40 MG/ML; 9 mL lidocaine (PF) 1 %  Outcome:  Tolerated well, no immediate complications  Procedure discussed: discussed risks, benefits, and alternatives    Consent Given by:  Patient  Timeout: timeout called immediately prior to procedure    Prep: patient was prepped and draped in usual sterile fashion

## 2020-10-26 ENCOUNTER — ALLIED HEALTH/NURSE VISIT (OUTPATIENT)
Dept: NURSING | Facility: CLINIC | Age: 65
End: 2020-10-26
Payer: COMMERCIAL

## 2020-10-26 DIAGNOSIS — Z23 NEED FOR VACCINATION: Primary | ICD-10-CM

## 2020-10-26 PROCEDURE — 90732 PPSV23 VACC 2 YRS+ SUBQ/IM: CPT

## 2020-10-26 PROCEDURE — 99207 PR NO CHARGE NURSE ONLY: CPT

## 2020-10-26 PROCEDURE — G0009 ADMIN PNEUMOCOCCAL VACCINE: HCPCS

## 2020-11-16 ENCOUNTER — MYC REFILL (OUTPATIENT)
Dept: FAMILY MEDICINE | Facility: CLINIC | Age: 65
End: 2020-11-16

## 2020-11-16 DIAGNOSIS — I10 BENIGN ESSENTIAL HYPERTENSION: ICD-10-CM

## 2020-11-17 RX ORDER — METOPROLOL SUCCINATE 50 MG/1
TABLET, EXTENDED RELEASE ORAL
Qty: 135 TABLET | Refills: 0 | Status: SHIPPED | OUTPATIENT
Start: 2020-11-17 | End: 2020-11-19

## 2020-11-17 NOTE — TELEPHONE ENCOUNTER
Medication is being filled for 1 time refill only due to:  needs blood pressure check by 12/19/20. Lanny Vásquez RN

## 2020-11-19 ENCOUNTER — OFFICE VISIT (OUTPATIENT)
Dept: FAMILY MEDICINE | Facility: CLINIC | Age: 65
End: 2020-11-19
Payer: COMMERCIAL

## 2020-11-19 VITALS
BODY MASS INDEX: 24.27 KG/M2 | HEIGHT: 66 IN | DIASTOLIC BLOOD PRESSURE: 82 MMHG | OXYGEN SATURATION: 99 % | TEMPERATURE: 97.6 F | SYSTOLIC BLOOD PRESSURE: 126 MMHG | WEIGHT: 151 LBS | HEART RATE: 66 BPM

## 2020-11-19 DIAGNOSIS — M79.10 MYALGIA: ICD-10-CM

## 2020-11-19 DIAGNOSIS — E78.5 HYPERLIPIDEMIA LDL GOAL <130: Primary | ICD-10-CM

## 2020-11-19 DIAGNOSIS — R53.83 OTHER FATIGUE: ICD-10-CM

## 2020-11-19 DIAGNOSIS — I10 BENIGN ESSENTIAL HYPERTENSION: ICD-10-CM

## 2020-11-19 PROCEDURE — 99214 OFFICE O/P EST MOD 30 MIN: CPT | Performed by: FAMILY MEDICINE

## 2020-11-19 RX ORDER — METOPROLOL SUCCINATE 50 MG/1
TABLET, EXTENDED RELEASE ORAL
Qty: 135 TABLET | Refills: 1 | Status: SHIPPED | OUTPATIENT
Start: 2020-11-19 | End: 2021-08-12

## 2020-11-19 ASSESSMENT — MIFFLIN-ST. JEOR: SCORE: 1241.68

## 2020-11-19 NOTE — PROGRESS NOTES
Subjective     Love Pinon is a 65 year old female who presents to clinic today for the following health issues:    HPI         Hypertension Follow-up      Do you check your blood pressure regularly outside of the clinic? No not recently although she thinks she is ok has lost weight as she is trying to eat better and some exercise     Are you following a low salt diet? No  But she is careful     Are your blood pressures ever more than 140 on the top number (systolic) OR more   than 90 on the bottom number (diastolic), for example 140/90? n/a      How many servings of fruits and vegetables do you eat daily?  2-3    On average, how many sweetened beverages do you drink each day (Examples: soda, juice, sweet tea, etc.  Do NOT count diet or artificially sweetened beverages)?   0    How many days per week do you exercise enough to make your heart beat faster? 3 or less    How many minutes a day do you exercise enough to make your heart beat faster? 30 - 60    How many days per week do you miss taking your medication? 0    Medication Followup of simvastatin     Taking Medication as prescribed: no    Side Effects:  Myalgias, body aches      Medication Helping Symptoms:     Has been on simvastatin for many years . Although she was having more ache and pain last several months and she was not sure what was causing the problem,  so she decided to take herself off and she thinks it may have helped some  with the constant pain , so she thinks her sx did improve some. Although she still feels stiff any achy sometimes.Although she knows  her shoulders still  bother her but wondering if she can stay of for a while. She also tell me that she gets enough sleep but still wake up feeling stiff and her muscles just feel tight. She admits to some stress and not sure If that could be affecting her and causing muscle tension. She is trying to eat better and exercise more and has lost weight , so hoping her lipid may be ok without  med's.     Review of Systems   Constitutional, HEENT, cardiovascular, pulmonary, GI, , musculoskeletal, neuro, skin, endocrine and psych systems are negative, except as otherwise noted.      Objective    There were no vitals taken for this visit.  There is no height or weight on file to calculate BMI.  Physical Exam   GENERAL: healthy, alert and no distress  HENT: ear canals and TM's normal, nose and mouth without ulcers or lesions  NECK: no adenopathy, no asymmetry, masses, or scars and thyroid normal to palpation  RESP: lungs clear to auscultation - no rales, rhonchi or wheezes  CV: regular rate and rhythm, normal S1 S2, no S3 or S4, no murmur,   ABDOMEN: soft, nontender, no hepatosplenomegaly, no masses   MS: no edema, no acute tenderness on legs, or arms at this time   NEURO: Normal strength and tone, mentation intact and speech normal  PSYCH: mentation appears normal, affect normal          Assessment & Plan     (I10) Benign essential hypertension  Comment:   Plan: Comprehensive metabolic panel, metoprolol         succinate ER (TOPROL-XL) 50 MG 24 hr tablet    BP in adequate control. Discussed cares, low fat low salt  diet etc. Check labs, call pt with results.   Refill given. encouraged home BP monitoring. Follow up recheck in 6 months, sooner if problem.     (E78.5) Hyperlipidemia LDL goal <130  (primary encounter diagnosis)  Comment: stopped  few months ago, but willing to consider trying Crestor if lipid remains elevated   Plan: Lipid panel reflex to direct LDL Fasting            (M79.10) Myalgia  Comment: unsure if statin related as still has some sx since she is off statin for a while  Plan: CK total, Comprehensive metabolic panel, TSH         with free T4 reflex, ESR: Erythrocyte         sedimentation rate, CRP, inflammation            (R53.83) Other fatigue  Comment:   Plan: CK total, Comprehensive metabolic panel, TSH         with free T4 reflex, ESR: Erythrocyte         sedimentation rate, CRP,  inflammation    Check labs. refill sent. Cares and  treatment discussed. follow up if problem   Patient expressed understanding and agreement with treatment plan. All patient's questions were answered, will let me know if has more later.  Medications: Rx's: Reviewed the potential side effects/complications of medications prescribed.     Bri House MD  [facility]

## 2020-11-20 DIAGNOSIS — I10 BENIGN ESSENTIAL HYPERTENSION: ICD-10-CM

## 2020-11-20 DIAGNOSIS — M79.10 MYALGIA: ICD-10-CM

## 2020-11-20 DIAGNOSIS — E78.5 HYPERLIPIDEMIA LDL GOAL <130: ICD-10-CM

## 2020-11-20 LAB
CRP SERPL-MCNC: <2.9 MG/L (ref 0–8)
ERYTHROCYTE [SEDIMENTATION RATE] IN BLOOD BY WESTERGREN METHOD: 7 MM/H (ref 0–30)

## 2020-11-20 PROCEDURE — 86140 C-REACTIVE PROTEIN: CPT | Performed by: FAMILY MEDICINE

## 2020-11-20 PROCEDURE — 84443 ASSAY THYROID STIM HORMONE: CPT | Performed by: FAMILY MEDICINE

## 2020-11-20 PROCEDURE — 80053 COMPREHEN METABOLIC PANEL: CPT | Performed by: FAMILY MEDICINE

## 2020-11-20 PROCEDURE — 85652 RBC SED RATE AUTOMATED: CPT | Performed by: FAMILY MEDICINE

## 2020-11-20 PROCEDURE — 80061 LIPID PANEL: CPT | Performed by: FAMILY MEDICINE

## 2020-11-20 PROCEDURE — 82550 ASSAY OF CK (CPK): CPT | Performed by: FAMILY MEDICINE

## 2020-11-20 PROCEDURE — 36415 COLL VENOUS BLD VENIPUNCTURE: CPT | Performed by: FAMILY MEDICINE

## 2020-11-22 LAB
ALBUMIN SERPL-MCNC: 4.1 G/DL (ref 3.4–5)
ALP SERPL-CCNC: 95 U/L (ref 40–150)
ALT SERPL W P-5'-P-CCNC: 18 U/L (ref 0–50)
ANION GAP SERPL CALCULATED.3IONS-SCNC: 5 MMOL/L (ref 3–14)
AST SERPL W P-5'-P-CCNC: 16 U/L (ref 0–45)
BILIRUB SERPL-MCNC: 0.4 MG/DL (ref 0.2–1.3)
BUN SERPL-MCNC: 13 MG/DL (ref 7–30)
CALCIUM SERPL-MCNC: 9.9 MG/DL (ref 8.5–10.1)
CHLORIDE SERPL-SCNC: 105 MMOL/L (ref 94–109)
CHOLEST SERPL-MCNC: 324 MG/DL
CK SERPL-CCNC: 71 U/L (ref 30–225)
CO2 SERPL-SCNC: 25 MMOL/L (ref 20–32)
CREAT SERPL-MCNC: 0.87 MG/DL (ref 0.52–1.04)
GFR SERPL CREATININE-BSD FRML MDRD: 69 ML/MIN/{1.73_M2}
GLUCOSE SERPL-MCNC: 97 MG/DL (ref 70–99)
HDLC SERPL-MCNC: 76 MG/DL
LDLC SERPL CALC-MCNC: 215 MG/DL
NONHDLC SERPL-MCNC: 248 MG/DL
POTASSIUM SERPL-SCNC: 4.2 MMOL/L (ref 3.4–5.3)
PROT SERPL-MCNC: 8 G/DL (ref 6.8–8.8)
SODIUM SERPL-SCNC: 135 MMOL/L (ref 133–144)
TRIGL SERPL-MCNC: 164 MG/DL
TSH SERPL DL<=0.005 MIU/L-ACNC: 1.16 MU/L (ref 0.4–4)

## 2020-12-10 ENCOUNTER — OFFICE VISIT (OUTPATIENT)
Dept: FAMILY MEDICINE | Facility: CLINIC | Age: 65
End: 2020-12-10
Payer: COMMERCIAL

## 2020-12-10 VITALS
WEIGHT: 148 LBS | OXYGEN SATURATION: 98 % | TEMPERATURE: 97.4 F | BODY MASS INDEX: 23.89 KG/M2 | HEART RATE: 64 BPM | SYSTOLIC BLOOD PRESSURE: 114 MMHG | DIASTOLIC BLOOD PRESSURE: 82 MMHG | RESPIRATION RATE: 16 BRPM

## 2020-12-10 DIAGNOSIS — H91.93 DECREASED HEARING, BILATERAL: ICD-10-CM

## 2020-12-10 DIAGNOSIS — K59.09 OTHER CONSTIPATION: ICD-10-CM

## 2020-12-10 DIAGNOSIS — E78.5 HYPERLIPIDEMIA LDL GOAL <130: ICD-10-CM

## 2020-12-10 DIAGNOSIS — Z00.00 ROUTINE HISTORY AND PHYSICAL EXAMINATION OF ADULT: Primary | ICD-10-CM

## 2020-12-10 PROCEDURE — 99214 OFFICE O/P EST MOD 30 MIN: CPT | Mod: 25 | Performed by: FAMILY MEDICINE

## 2020-12-10 PROCEDURE — G0438 PPPS, INITIAL VISIT: HCPCS | Performed by: FAMILY MEDICINE

## 2020-12-10 RX ORDER — POLYETHYLENE GLYCOL 3350 17 G/17G
1 POWDER, FOR SOLUTION ORAL DAILY
Qty: 578 G | Refills: 4 | Status: SHIPPED | OUTPATIENT
Start: 2020-12-10 | End: 2021-04-30

## 2020-12-10 RX ORDER — ROSUVASTATIN CALCIUM 5 MG/1
5 TABLET, COATED ORAL DAILY
Qty: 30 TABLET | Refills: 3 | Status: SHIPPED | OUTPATIENT
Start: 2020-12-10 | End: 2021-05-10

## 2020-12-10 ASSESSMENT — ACTIVITIES OF DAILY LIVING (ADL): CURRENT_FUNCTION: NO ASSISTANCE NEEDED

## 2020-12-10 NOTE — PROGRESS NOTES
"SUBJECTIVE:   Love Pinon is a 65 year old female who presents for Preventive Visit.      Patient has been advised of split billing requirements and indicates understanding: Yes   Are you in the first 12 months of your Medicare coverage?  No    Healthy Habits:    In general, how would you rate your overall health?  Good    Frequency of exercise:  6-7 days/week    Duration of exercise:  30-45 minutes    Do you usually eat at least 4 servings of fruit and vegetables a day, include whole grains    & fiber and avoid regularly eating high fat or \"junk\" foods?  No    Taking medications regularly:  Yes    Barriers to taking medications:  None    Medication side effects:  None    Ability to successfully perform activities of daily living:  No assistance needed    Home Safety:  No safety concerns identified    Hearing Impairment:  No hearing concerns    In the past 6 months, have you been bothered by leaking of urine?  No    In general, how would you rate your overall mental or emotional health?  Good      PHQ-2 Total Score:    Additional concerns today:  No    Do you feel safe in your environment? Yes    Have you ever done Advance Care Planning? (For example, a Health Directive, POLST, or a discussion with a medical provider or your loved ones about your wishes): Yes, advance care planning is on file.          Vision  Acuity: no concerns has contacts and seeing eye doctor     Hearing Assessment: normal  Fall risk  Fallen 2 or more times in the past year?: No  Any fall with injury in the past year?: No    Cognitive Screening   1) Repeat 3 items (Leader, Season, Table)    2) Clock draw: NORMAL  3) 3 item recall: Recalls 2 objects   Results: NORMAL clock, 1-2 items recalled: COGNITIVE IMPAIRMENT LESS LIKELY    Mini-CogTM Copyright CANDELARIO Caballero. Licensed by the author for use in [facility]; reprinted with permission ([email or web address]@.[email or web address]). All rights reserved.      Do you have sleep apnea, excessive snoring or daytime " drowsiness?: no    Reviewed and updated as needed this visit by clinical staff                 Reviewed and updated as needed this visit by Provider                Social History     Tobacco Use     Smoking status: Former Smoker     Years: 18.00     Types: Cigarettes     Quit date: 1989     Years since quittin.9     Smokeless tobacco: Never Used   Substance Use Topics     Alcohol use: Yes     Alcohol/week: 3.0 standard drinks     Types: 3 Standard drinks or equivalent per week     Comment: 2/week     If you drink alcohol do you typically have >3 drinks per day or >7 drinks per week? No          PROBLEMS TO ADD ON...  Has noticed gradual decline in  hearing more so  in left for a long while, rt is not too bad.   No associated HA, otalgias, visual sx, no vertigo, ringing etc.  Both parents had hearing issue in older age so  she wants to get a referral to get checked       Hyperlipidemia Follow-Up      Are you regularly taking any medication or supplement to lower your cholesterol?   No  She stopped taking simvastatin bc she though it may have cause myalgia, although her lipid went back very hig again  So wants to try Crestor. Mom has taken Crestor without problem. She tries to eat well and exercise but not able to lower her numbers, so wants to take med's to help      Are you having muscle aches or other side effects that you think could be caused by your cholesterol lowering medication?  Yes problem with simvastatin       Current providers sharing in care for this patient include:   Patient Care Team:  Bri House MD as PCP - General (Family Practice)  Bri House MD as Assigned PCP  Rafi De Santiago MD as Assigned Musculoskeletal Provider    The following health maintenance items are reviewed in Epic and correct as of today:  Health Maintenance   Topic Date Due     FALL RISK ASSESSMENT  2019     PHQ-2  2020     MEDICARE ANNUAL WELLNESS VISIT  2020     ZOSTER  IMMUNIZATION (2 of 2) 2020     ADVANCE CARE PLANNING  2021     COLORECTAL CANCER SCREENING  09/15/2021     MAMMO SCREENING  2021     CMP  2021     LIPID  2021     DTAP/TDAP/TD IMMUNIZATION (4 - Td) 2028     DEXA  2034     HEPATITIS C SCREENING  Completed     MIGRAINE ACTION PLAN  Completed     INFLUENZA VACCINE  Completed     Pneumococcal Vaccine: 65+ Years  Completed     Pneumococcal Vaccine: Pediatrics (0 to 5 Years) and At-Risk Patients (6 to 64 Years)  Aged Out     IPV IMMUNIZATION  Aged Out     MENINGITIS IMMUNIZATION  Aged Out     HEPATITIS B IMMUNIZATION  Aged Out     Patient Active Problem List   Diagnosis     HYPERLIPIDEMIA LDL GOAL <130     Advance care planning     BPPV (benign paroxysmal positional vertigo)     Migraine headache without aura     Hypertension goal BP (blood pressure) < 140/90     Seizure disorder (H)     Osteoporosis     Chronic idiopathic constipation     Hyponatremia     Screening for breast cancer     Nodular goiter     Cervical cancer screening     Thyroid nodule     Benign essential hypertension     Past Surgical History:   Procedure Laterality Date     APPENDECTOMY       CL [facility]FF SURGICAL PATHOLOGY  1970    with cryotherapy     COLONOSCOPY  2005    polyp excision, repeat  5 years      COLONOSCOPY N/A 9/15/2016    3 adenoma polyps, repeat in 3 years      D & C       HYSTEROSCOPY       TUBAL LIGATION         Social History     Tobacco Use     Smoking status: Former Smoker     Years: 18.00     Types: Cigarettes     Quit date: 1989     Years since quittin.9     Smokeless tobacco: Never Used   Substance Use Topics     Alcohol use: Yes     Alcohol/week: 3.0 standard drinks     Types: 3 Standard drinks or equivalent per week     Comment: 2/week     Family History   Problem Relation Age of Onset     Cardiovascular Paternal Grandfather          of heart attack     Hypertension Father      Lipids Father      Cancer Father          "MELANOMA     Genitourinary Problems Mother      Cancer Paternal Grandmother      Asthma Sister      Asthma Sister          Pneumonia Vaccine:Adults age 65+ who received Pneumovax (PPSV23) at 65 years or older: Should be given PCV13 > 1 year after their most recent PPSV23  Mammogram Screening: Mammogram Screening: Patient over age 50, mutual decision to screen reflected in health maintenance.    Review of Systems  CONSTITUTIONAL: NEGATIVE for fever, chills, change in weight  INTEGUMENTARY/SKIN: NEGATIVE for worrisome rashes, moles or lesions  EYES: NEGATIVE for vision changes or irritation  ENT/MOUTH: NEGATIVE for ear, mouth and throat problems  RESP: NEGATIVE for significant cough or SOB  BREAST: NEGATIVE for masses, tenderness or discharge  CV: NEGATIVE for chest pain, palpitations or peripheral edema  GI: NEGATIVE for nausea, abdominal pain, heartburn, or change in bowel habits but has  tendency to be constipated and needs senakot on and off to help.   : NEGATIVE for frequency, dysuria, or hematuria  MUSCULOSKELETAL: NEGATIVE for significant arthralgias or myalgia  NEURO: NEGATIVE for weakness, dizziness or paresthesias  ENDOCRINE: NEGATIVE for temperature intolerance, skin/hair changes  HEME: NEGATIVE for bleeding problems  PSYCHIATRIC: NEGATIVE for changes in mood or affect    OBJECTIVE:   There were no vitals taken for this visit. Estimated body mass index is 24.37 kg/m  as calculated from the following:    Height as of 11/19/20: 1.676 m (5' 6\").    Weight as of 11/19/20: 68.5 kg (151 lb).  Physical Exam  GENERAL APPEARANCE: healthy, alert and no distress  EYES: Eyes grossly normal to inspection,  and conjunctivae and sclerae normal  HENT: ear canals and TM's normal, nose and mouth without ulcers or lesions, oropharynx clear and oral mucous membranes moist  NECK: no adenopathy, no asymmetry, masses, or scars and thyroid normal to palpation  RESP: lungs clear to auscultation - no rales, rhonchi or " wheezes  BREAST: normal without masses, tenderness or nipple discharge and no palpable axillary masses or adenopathy  CV: regular rate and rhythm, normal S1 S2, no S3 or S4, no murmur, click or rub, no peripheral edema   ABDOMEN: soft, nontender, no hepatosplenomegaly, no masses and bowel sounds normal   (female): deferred  MS: no musculoskeletal defects are noted and gait is age appropriate without ataxia  SKIN: no suspicious lesions or rashes  NEURO: Normal strength and tone, sensory exam grossly normal, mentation intact and speech normal  PSYCH: mentation appears normal and affect normal/bright        ASSESSMENT / PLAN:   (Z00.00) Routine history and physical examination of adult  (primary encounter diagnosis)  Comment:   Plan:     (E78.5) Hyperlipidemia LDL goal <130  Comment:   Plan: rosuvastatin (CRESTOR) 5 MG tablet        Lipid has been very high and willing to try crestor since unable to tolerate simvastatin. Pros/ cons of med's discussed . Script faxed . Diet/ exercise discussed f/u check in 3 months  sooner if problem     (K59.09) Other constipation  Comment:   Plan: polyethylene glycol (MIRALAX) 17 GM/Dose powder             discussed high fibre / fluid diet to regulate Bm.  gave miralax  to help also talked about probiotic use etc . Talked about warning s/s for which should be seen. Cares and symptomatic treatment discussed follow up if  recurrent or ongoing problem.        (H91.93) Decreased hearing, bilateral  Comment:   Plan: OTOLARYNGOLOGY REFERRAL        Willing to proceed with further evaluation referral placed       .Cares and  treatment discussed.  follow up if problem   Patient expressed understanding and agreement with treatment plan. All patient's questions were answered, will let me know if has more later.  Medications: Rx's: Reviewed the potential side effects/complications of medications prescribed.       COUNSELING:  Reviewed preventive health counseling, as reflected in patient  "instructions       Regular exercise       Healthy diet/nutrition       Vision screening       Hearing screening       Fall risk prevention       Osteoporosis Prevention/Bone Health       Colon cancer screening    Estimated body mass index is 24.37 kg/m  as calculated from the following:    Height as of 11/19/20: 1.676 m (5' 6\").    Weight as of 11/19/20: 68.5 kg (151 lb).        She reports that she quit smoking about 30 years ago. Her smoking use included cigarettes. She quit after 18.00 years of use. She has never used smokeless tobacco.      Appropriate preventive services were discussed with this patient, including applicable screening as appropriate for cardiovascular disease, diabetes, osteopenia/osteoporosis, and glaucoma.  As appropriate for age/gender, discussed screening for colorectal cancer, prostate cancer, breast cancer, and cervical cancer. Checklist reviewing preventive services available has been given to the patient.    Reviewed patients plan of care and provided an AVS. The Basic Care Plan (routine screening as documented in Health Maintenance) for Love meets the Care Plan requirement. This Care Plan has been established and reviewed with the Patient.    Counseling Resources:  ATP IV Guidelines  Pooled Cohorts Equation Calculator  Breast Cancer Risk Calculator  Breast Cancer: Medication to Reduce Risk  FRAX Risk Assessment  ICSI Preventive Guidelines  Dietary Guidelines for Americans, 2010  [employer]'s MyPlate  ASA Prophylaxis  Lung CA Screening    Bri House MD  [facility]    Identified Health Risks:  "

## 2020-12-10 NOTE — PATIENT INSTRUCTIONS
Patient Education     Prevention Guidelines, Women Ages 65 and Older  Screening tests and vaccines are an important part of managing your health. A screening test is done to find possible disorders or diseases in people who don't have any symptoms. The goal is to find a disease early so lifestyle changes can be made and you can be watched more closely to reduce the risk of disease, or to detect it early enough to treat it most effectively. Screening tests are not considered diagnostic, but are used to determine if more testing is needed. Health counseling is essential, too. Below are guidelines for these, for women ages 65 and older. Talk with your healthcare provider to make sure you re up to date on what you need.  Screening Who needs it How often   Type 2 diabetes or prediabetes All women ages 40 to 75 who are overweight or obese At least every 3 years   Type 2 diabetes All women with prediabetes Every year   Alcohol misuse All women in this age group At routine exams   Blood pressure All women in this age group Yearly checkup if your blood pressure is normal*  Normal blood pressure is less than 120/80 mm Hg*  If your blood pressure reading is higher than normal, follow the advice of your healthcare provider   Breast cancer All women of average risk  There are no guidelines for breast cancer screening for 75 years and older.  Mammograms should be done every 1 or 2 years until age 75. At that point a woman should talk to her doctor about whether to continue screening. Talk to your doctor regarding your recommended frequency depending on your risk factors.   Cervical cancer Only women who had abnormal screening results before age 65 Talk with your healthcare provider   Chlamydia Women at increased risk for infection At routine exams   Colorectal cancer All women over the age of 50  This screening is advised against for women over 75 or if there is a life expectancy of less than 10 years.  Multiple tests are  available and are used at different times. Possible tests include: flexible sigmoidoscopy, colonoscopy, double-contrast barium enema, yearly fecal occult blood test, fecal immunochemical test, or stool DNA test as often as your healthcare provider advises. Talk with your healthcare provider about which tests are best for you.   Depression All women in this age group At routine exams   Gonorrhea Sexually active women at increased risk for infection At routine exams   Hepatitis C Anyone at increased risk; 1 time for those born between 1945 and 1965 At routine exams   High cholesterol or triglycerides All women in this age group who are at risk for coronary artery disease At least every 5 years   HIV Women at increased risk for infection-talk with your healthcare provider At routine exams   Lung cancer Adults ages 55 to 74 who have smoked with a 30-pack-a-year history and have smoked within the past 15 years  Annual low dose CT scan   Obesity All women in this age group At routine exams   Osteoporosis All women in this age group Bone density test at age 65, then follow-up as advised by your healthcare provider   Syphilis Women at increased risk for infection-talk with your healthcare provider At routine exams   Thyroid-Stimulating Hormone (TSH) All women in this age group with symptoms of thyroid dysfunction. There is not enough evidence to support TSH screening in women without symptoms.  ACOG recommendation is every 5 years; American Academy of Family Physicians concludes there is not enough evidence to support routine screening in adults without symptoms.    Tuberculosis Women at increased risk for infection-talk with your healthcare provider Ask your healthcare provider   Vision All women in this age group Every 1 to 2 years; if you have a chronic health condition, ask your healthcare provider if you need exams more often   Vaccine Who needs it How often   Chickenpox (varicella) All women in this age group who have  no record of this infection or vaccine 2 doses; second dose should be given at least 4 weeks after the first dose   Hepatitis A Women at increased risk for infection-talk with your healthcare provider 2 doses given 6 months apart   Hepatitis B Women at increased risk for infection-talk with your healthcare provider 3 doses over 6 months; second dose should be given 1 month after the first dose; the third dose should be given at least 2 months after the second dose and at least 4 months after the first dose   Haemophilus influenza Type B (HIB) Women at increased risk for infection-talk with your healthcare provider 1 to 3 doses   Influenza (flu) All women in this age group Once a year   Pneumococcal conjugate vaccine (PCV13) and pneumococcal polysaccharide vaccine (PPSV23) All women in this age group 1 dose of each vaccine   Tetanus/diphtheria/pertussis (Td/Tdap) booster All women in this age group Td every 10 years, or a one-time dose of Tdap instead of a Td booster after age 18, then Td every 10 years   Zoster All women in this age group 1 dose   Counseling Who needs it How often   Diet and exercise Women who are overweight or obese When diagnosed, and then at routine exams   Fall prevention (exercise and vitamin D supplements) All women in this age group At routine exams   Sexually transmitted infection prevention Women at increased risk for infection-talk with your healthcare provider At routine exams   Use of daily aspirin Talk to your healthcare provider about whether or not to start taking low-dose aspirin for the prevention of cardiovascular disease (CVD) and colorectal cancer in adults ages 60 to 69 who have at least a 10% risk of getting CVD within the next 10 years.  People who are not at increased risk for bleeding, have a life expectancy of at least 10 years, and are willing to take low-dose aspirin daily for at least 10 years are more likely to benefit. When the advantages of taking low-dose aspirin  outweigh the risks, people may choose to start taking a low-dose aspirin.  There is not enough data to support the use of aspirin in people over the age of 70.  When your risk is known   Use of tobacco and the health effects it can cause All women in this age group Every exam   [employer] last reviewed this educational content on 11/1/2017 2000-2020 [employer]. [address] [place] [address], [place] [address]. All rights reserved. This information is not intended as a substitute for professional medical care. Always follow your healthcare professional's instructions.           Patient Education     Constipation (Adult)  Constipation means that you have bowel movements that are less frequent than usual. Stools often become very hard and difficult to pass.  Constipation is very common. At some point in life, it affects almost everyone. Since everyone's bowel habits are different, what is constipation to one person may not be to another. Your healthcare provider may do tests to diagnose constipation. It depends on what he or she finds when evaluating you.    Symptoms of constipation include:    Abdominal pain    Bloating    Vomiting    Painful bowel movements    Itching, swelling, bleeding, or pain around the anus  Causes  Constipation can have many causes. These include:    Diet low in fiber    Too much dairy    Not drinking enough liquids    Lack of exercise or physical activity (especially true for older adults)    Changes in lifestyle or daily routine, including pregnancy, aging, work, and travel    Frequent use or misuse of laxatives    Ignoring the urge to have a bowel movement or delaying it until later    Medicines, such as certain prescription pain medicines, iron supplements, antacids, certain antidepressants, and calcium supplements    Diseases like irritable bowel syndrome, bowel obstructions, stroke, diabetes, thyroid disease, Parkinson disease, hemorrhoids, and colon  cancer  Complications  Potential complications of constipation can include:    Hemorrhoids    Rectal bleeding from hemorrhoids or anal fissures (skin tears)    Hernias    Dependency on laxatives    Chronic constipation    Fecal impaction, a severe form of constipation in which a large amount of hard stool is in your rectum that you can't pass    Bowel obstruction or perforation  Home care  All treatment should be done after talking with your healthcare provider. This is especially true if you have another medical problems, are taking prescription medicines, or are an older adult. Treatment most often involves lifestyle changes. You may also need medicines. Your healthcare provider will tell you which will work best for you. Follow the advice below to help avoid this problem in the future.  Lifestyle changes  These lifestyle changes can help prevent constipation:    Diet. Eat a high-fiber diet, with fresh fruit and vegetables, and reduce dairy intake, meats, and processed foods    Fluids. It's important to get enough fluids each day. Drink plenty of water when you eat more fiber. If you are on diet that limits the amount of fluid you can have, talk about this with your healthcare provider.    Regular exercise. Check with your healthcare provider first.  Medicines  Take any medicines as directed. Some laxatives are safe to use only every now and then. Others can be taken on a regular basis. While laxatives don't cause bowel dependence, they are treating the symptoms. So your constipation may return if you don't make other changes. Talk with your healthcare provider or pharmacist if you have questions.  Prescription pain medicines can cause constipation. If you are taking this kind of medicine, ask your healthcare provider if you should also take a stool softener.  Medicines you may take to treat constipation include:    Fiber supplements    Stool softeners    Laxatives    Enemas    Rectal suppositories  Follow-up  care  Follow up with your healthcare provider if symptoms don't get better in the next few days. You may need to have more tests or see a specialist.  Call 911  Call 911 if any of these occur:    Trouble breathing    Stiff, rigid abdomen that is severely painful to touch    Confusion    Fainting or loss of consciousness    Rapid heart rate    Chest pain  When to seek medical advice  Call your healthcare provider right away if any of these occur:    Fever of 100.4 F (38 C) or higher, or as directed by your healthcare provider    Failure to resume normal bowel movements    Pain in your abdomen or back gets worse    Nausea or vomiting    Swelling in your abdomen    Blood in the stool    Black, tarry stool    Involuntary weight loss    Weakness  [employer] last reviewed this educational content on 6/1/2018 2000-2020 [employer]. [address] [place] [address], [place] [address]. All rights reserved. This information is not intended as a substitute for professional medical care. Always follow your healthcare professional's instructions.

## 2020-12-17 ENCOUNTER — HOSPITAL ENCOUNTER (OUTPATIENT)
Dept: MAMMOGRAPHY | Facility: CLINIC | Age: 65
Discharge: HOME | End: 2020-12-17
Attending: FAMILY MEDICINE | Admitting: FAMILY MEDICINE
Payer: COMMERCIAL

## 2020-12-17 DIAGNOSIS — Z12.31 SCREENING MAMMOGRAM, ENCOUNTER FOR: ICD-10-CM

## 2020-12-17 PROCEDURE — 77063 BREAST TOMOSYNTHESIS BI: CPT

## 2021-03-08 ENCOUNTER — TRANSFERRED RECORDS (OUTPATIENT)
Dept: HEALTH INFORMATION MANAGEMENT | Facility: CLINIC | Age: 66
End: 2021-03-08

## 2021-04-30 ENCOUNTER — MYC MEDICAL ADVICE (OUTPATIENT)
Dept: FAMILY MEDICINE | Facility: CLINIC | Age: 66
End: 2021-04-30

## 2021-04-30 ENCOUNTER — OFFICE VISIT (OUTPATIENT)
Dept: FAMILY MEDICINE | Facility: CLINIC | Age: 66
End: 2021-04-30
Payer: COMMERCIAL

## 2021-04-30 ENCOUNTER — TELEPHONE (OUTPATIENT)
Dept: FAMILY MEDICINE | Facility: CLINIC | Age: 66
End: 2021-04-30

## 2021-04-30 VITALS
TEMPERATURE: 98.3 F | HEIGHT: 66 IN | OXYGEN SATURATION: 99 % | RESPIRATION RATE: 16 BRPM | BODY MASS INDEX: 24.91 KG/M2 | SYSTOLIC BLOOD PRESSURE: 134 MMHG | WEIGHT: 155 LBS | HEART RATE: 72 BPM | DIASTOLIC BLOOD PRESSURE: 64 MMHG

## 2021-04-30 DIAGNOSIS — R31.9 URINARY TRACT INFECTION WITH HEMATURIA, SITE UNSPECIFIED: Primary | ICD-10-CM

## 2021-04-30 DIAGNOSIS — G40.909 SEIZURE DISORDER (H): ICD-10-CM

## 2021-04-30 DIAGNOSIS — R35.0 URINARY FREQUENCY: ICD-10-CM

## 2021-04-30 DIAGNOSIS — E78.5 HYPERLIPIDEMIA LDL GOAL <130: ICD-10-CM

## 2021-04-30 DIAGNOSIS — N39.0 URINARY TRACT INFECTION WITH HEMATURIA, SITE UNSPECIFIED: Primary | ICD-10-CM

## 2021-04-30 LAB
ALBUMIN UR-MCNC: 100 MG/DL
AMORPH CRY #/AREA URNS HPF: ABNORMAL /HPF
APPEARANCE UR: CLEAR
BACTERIA #/AREA URNS HPF: ABNORMAL /HPF
BILIRUB UR QL STRIP: NEGATIVE
COLOR UR AUTO: YELLOW
GLUCOSE UR STRIP-MCNC: NEGATIVE MG/DL
HGB UR QL STRIP: ABNORMAL
KETONES UR STRIP-MCNC: NEGATIVE MG/DL
LEUKOCYTE ESTERASE UR QL STRIP: ABNORMAL
NITRATE UR QL: NEGATIVE
NON-SQ EPI CELLS #/AREA URNS LPF: ABNORMAL /LPF
PH UR STRIP: 7 PH (ref 5–7)
RBC #/AREA URNS AUTO: ABNORMAL /HPF
SOURCE: ABNORMAL
SP GR UR STRIP: 1.02 (ref 1–1.03)
UROBILINOGEN UR STRIP-ACNC: 0.2 EU/DL (ref 0.2–1)
WBC #/AREA URNS AUTO: ABNORMAL /HPF

## 2021-04-30 PROCEDURE — 81001 URINALYSIS AUTO W/SCOPE: CPT | Performed by: INTERNAL MEDICINE

## 2021-04-30 PROCEDURE — 99214 OFFICE O/P EST MOD 30 MIN: CPT | Performed by: INTERNAL MEDICINE

## 2021-04-30 PROCEDURE — 87086 URINE CULTURE/COLONY COUNT: CPT | Performed by: INTERNAL MEDICINE

## 2021-04-30 RX ORDER — ZOSTER VACCINE RECOMBINANT, ADJUVANTED 50 MCG/0.5
KIT INTRAMUSCULAR
COMMUNITY
Start: 2020-09-10 | End: 2023-07-14

## 2021-04-30 RX ORDER — INFLUENZA A VIRUS A/MICHIGAN/45/2015 X-275 (H1N1) ANTIGEN (FORMALDEHYDE INACTIVATED), INFLUENZA A VIRUS A/SINGAPORE/INFIMH-16-0019/2016 IVR-186 (H3N2) ANTIGEN (FORMALDEHYDE INACTIVATED), INFLUENZA B VIRUS B/PHUKET/3073/2013 ANTIGEN (FORMALDEHYDE INACTIVATED), AND INFLUENZA B VIRUS B/MARYLAND/15/2016 BX-69A ANTIGEN (FORMALDEHYDE INACTIVATED) 60; 60; 60; 60 UG/.7ML; UG/.7ML; UG/.7ML; UG/.7ML
INJECTION, SUSPENSION INTRAMUSCULAR
COMMUNITY
Start: 2020-10-14 | End: 2023-03-23

## 2021-04-30 RX ORDER — SULFAMETHOXAZOLE/TRIMETHOPRIM 800-160 MG
1 TABLET ORAL 2 TIMES DAILY
Qty: 14 TABLET | Refills: 0 | Status: SHIPPED | OUTPATIENT
Start: 2021-04-30 | End: 2021-11-02

## 2021-04-30 ASSESSMENT — MIFFLIN-ST. JEOR: SCORE: 1254.83

## 2021-04-30 ASSESSMENT — PAIN SCALES - GENERAL: PAINLEVEL: NO PAIN (0)

## 2021-04-30 NOTE — PROGRESS NOTES
Assessment and Plan  1. Urinary tract infection with hematuria, site unspecified  2. Urinary frequency  New problem , reviewed previous UA, had UTI once every year , no definitive culture results seen. No risk factors of Nephrolithiasis as per chart review. Will update with culture results if any changes.  - sulfamethoxazole-trimethoprim (BACTRIM DS) 800-160 MG tablet; Take 1 tablet by mouth 2 times daily  Dispense: 14 tablet; Refill: 0  - Urine Culture Aerobic Bacterial  - UA reflex to Microscopic and Culture  - Urine Microscopic    3. Seizure disorder (H)  Well controlled, last episode in 1989 one seizure , follows Neurology EEG Q 5 yrs at [facility] , every 2 yrs. Next due in 6/2022. Currently Topamax 50 mg BID.     4. Hyperlipidemia LDL goal <130  Uncontrolled, pt was changed from Simvastatin to Crestor 5 mg daily in the last lab work 11/2020. Will recheck at this time and do further titration of Crestor if needed.   - Lipid panel reflex to direct LDL Fasting; Future       Patient Instructions   As discussed, awaiting for lab result of your Urine exam, will contact you with the results.     =====================    Patient Education     Bladder Infection, Female (Adult)     Urine normally doesn't have any germs (bacteria) in it. But bacteria can get into the urinary tract from the skin around the rectum. Or they can travel in the blood from other parts of the body. Once they are in your urinary tract, they can cause infection in these areas:    The urethra (urethritis)    The bladder (cystitis)    The kidneys (pyelonephritis)  The most common place for an infection is in the bladder. This is called a bladder infection. This is one of the most common infections in women. Most bladder infections are easily treated. They are not serious unless the infection spreads to the kidney.  The terms bladder infection, UTI, and cystitis are often used to describe the same thing. But they are not always  the same. Cystitis is an inflammation of the bladder. The most common cause of cystitis is an infection.  Symptoms  The infection causes inflammation in the urethra and bladder. This causes many of the symptoms. The most common symptoms of a bladder infection are:    Pain or burning when urinating    Having to urinate more often than normal    Urgent need to urinate    Only a small amount of urine comes out    Blood in urine    Belly (abdominal) discomfort. This is often in the lower belly above the pubic bone.    Cloudy urine    Strong- or bad-smelling urine    Unable to urinate (urinary retention)    Unable to hold urine in (urinary incontinence)    Fever    Loss of appetite    Confusion (in older adults)  Causes  Bladder infections are not contagious. You can't get one from someone else, from a toilet seat, or from sharing a bath.  The most common cause of bladder infections is bacteria from the bowels. The bacteria get onto the skin around the opening of the urethra. From there, they can get into the urine. Then they travel up to the bladder, causing inflammation and infection. This often happens because of:    Wiping incorrectly after urinating. Always wipe from front to back.    Bowel incontinence    Pregnancy    Procedures such as having a catheter put in    Older age    Not emptying your bladder. This can give bacteria a chance to grow in your urine.    Fluid loss (dehydration)    Constipation    Having sex    Using a diaphragm for birth control   Treatment  Bladder infections are diagnosed by a urine test and urine culture. They are treated with antibiotics. They often clear up quickly without problems. Treatment helps prevent a more serious kidney infection.  Medicines  Medicines can help in the treatment of a bladder infection:    Take antibiotics until they are used up, even if you feel better. It's important to finish them to make sure the infection has cleared.    You can use acetaminophen or ibuprofen  for pain, fever, or discomfort, unless another medicine was prescribed. If you have long-term (chronic) liver or kidney disease, talk with your healthcare provider before using these medicines. Also talk with your provider if you've ever had a stomach ulcer or GI (gastrointestinal) bleeding, or are taking blood-thinner medicines.    If you are given phenazopydridine to reduce burning with urination, it will make your urine a bright orange color. This can stain clothing.  Care and prevention  These self-care steps can help prevent future infections:    Drink plenty of fluids. This helps to prevent dehydration and flush out your bladder. Do this unless you must restrict fluids for other health reasons, or your healthcare provider told you not to.    Clean yourself correctly after going to the bathroom. Wipe from front to back after using the toilet. This helps prevent the spread of bacteria.    Urinate more often. Don't try to hold urine in for a long time.    Wear loose-fitting clothes and cotton underwear. Don't wear tight-fitting pants.    Improve your diet and prevent constipation. Eat more fresh fruits and vegetables, and fiber. Eat less junk foods and fatty foods.    Don't have sex until your symptoms are gone.    Don't have caffeine, alcohol, and spicy foods. These can irritate your bladder.    Urinate right after you have sex to flush out your bladder.    If you use birth control pills and have frequent bladder infections, discuss it with your healthcare provider.  Follow-up care  Call your healthcare provider if all symptoms are not gone after 3 days of treatment. This is especially important if you have repeat infections.  If a culture was done, you will be told if your treatment needs to be changed. If directed, you can call to find out the results.  If X-rays were done, you will be told if the results will affect your treatment.  Call 911  Call 911 if any of the following occur:    Trouble  breathing    Hard to wake up or confusion    Fainting (loss of consciousness)    Fast heart rate  When to get medical advice  Call your healthcare provider right away if any of these occur:    Fever of 100.4 F (38.0 C) or higher, or as directed by your healthcare provider    Symptoms are not better after 3 days of treatment    Back or belly pain that gets worse    Repeated vomiting, or unable to keep medicine down    Weakness or dizziness    Vaginal discharge    Pain, redness, or swelling in the outer vaginal area (labia)  [employer] last reviewed this educational content on 11/1/2019 2000-2021 [employer]. All rights reserved. This information is not intended as a substitute for professional medical care. Always follow your healthcare professional's instructions.             Return in about 6 months (around 10/30/2021), or if symptoms worsen or fail to improve, for Follow up of last visit.    Madina Abdul MD  [facility] IVANA Aleman is a 66 year old who presents for the following health issues       HPI     Genitourinary - Female  Onset/Duration: 2 weeks  Description:   Painful urination (Dysuria): YES           Frequency: no  Blood in urine (Hematuria): no  Delay in urine (Hesitency): YES  Intensity: moderate  Progression of Symptoms:  worsening  Accompanying Signs & Symptoms:  Fever/chills: no  Flank pain: no  Nausea and vomiting: no  Vaginal symptoms: none  Abdominal/Pelvic Pain: no  History:   History of frequent UTI s: YES  History of kidney stones: no  Sexually Active: no  Possibility of pregnancy: No  Precipitating or alleviating factors: None  Therapies tried and outcome:  None     Pt is new to me       Allergies   Allergen Reactions     Aramine [Metaraminol Bitartrate]      Penicillins      Hives, swelling        Past Medical History:   Diagnosis Date     BPPV (benign paroxysmal positional vertigo) 10/26/2012     Herpes genitalia       Hyperlipidemia      Hypertension      Iron deficiency anemia secondary to blood loss (chronic)     menorrhagia     Migraine headache without aura      Seizure disorder (H)     one seizure-grandmal in        Past Surgical History:   Procedure Laterality Date     APPENDECTOMY  2007     CL [facility]FF SURGICAL PATHOLOGY  1970    with cryotherapy     COLONOSCOPY  2005    polyp excision, repeat  5 years      COLONOSCOPY N/A 9/15/2016    3 adenoma polyps, repeat in 3 years      D & C       HYSTEROSCOPY       TUBAL LIGATION         Family History   Problem Relation Age of Onset     Cardiovascular Paternal Grandfather          of heart attack     Hypertension Father      Lipids Father      Cancer Father         MELANOMA     Genitourinary Problems Mother      Cancer Paternal Grandmother      Asthma Sister      Asthma Sister        Social History     Tobacco Use     Smoking status: Former Smoker     Years: 18.00     Types: Cigarettes     Quit date: 1989     Years since quittin.3     Smokeless tobacco: Never Used   Substance Use Topics     Alcohol use: Yes     Alcohol/week: 3.0 standard drinks     Types: 3 Standard drinks or equivalent per week     Comment: 2/week        Current Outpatient Medications   Medication     Cholecalciferol (VITAMIN D-3 PO)     Cranberry 500 MG CAPS     fexofenadine (ALLEGRA) 180 MG tablet     FISH OIL 1000 MG OR CAPS     metoprolol succinate ER (TOPROL-XL) 50 MG 24 hr tablet     rosuvastatin (CRESTOR) 5 MG tablet     sulfamethoxazole-trimethoprim (BACTRIM DS) 800-160 MG tablet     topiramate (TOPAMAX) 50 MG tablet     FLUZONE HIGH-DOSE QUADRIVALENT 0.7 ML DEMETRIUS injection     SHINGRIX injection     Current Facility-Administered Medications   Medication     lidocaine (PF) (XYLOCAINE) 1 % injection 9 mL     triamcinolone (KENALOG-40) injection 40 mg        Review of Systems   Constitutional, HEENT, cardiovascular, pulmonary, GI, , musculoskeletal, neuro, skin, endocrine and psych systems  "are negative, except as otherwise noted.      Objective    /64   Pulse 72   Temp 98.3  F (36.8  C) (Tympanic)   Resp 16   Ht 1.676 m (5' 6\")   Wt 70.3 kg (155 lb)   SpO2 99%   BMI 25.02 kg/m    Body mass index is 25.02 kg/m .  Physical Exam   GENERAL: healthy, alert and no distress  NECK: no adenopathy, no asymmetry, masses, or scars and thyroid normal to palpation  RESP: lungs clear to auscultation - no rales, rhonchi or wheezes  CV: regular rate and rhythm, normal S1 S2, no S3 or S4, no murmur, click or rub, no peripheral edema and peripheral pulses strong  ABDOMEN: soft, nontender, no hepatosplenomegaly, no masses and bowel sounds normal. No CVAT.   MS: no gross musculoskeletal defects noted, no edema    Labs and imaging reviewed and discussed with the patient.      "

## 2021-04-30 NOTE — RESULT ENCOUNTER NOTE
Your Urine exam is positive for UTI. I have sent in antibiotic to your pharmacy. Please start it off ASAP. I will update on your culture results which takes few days. Please let me know if you have any questions.    Thank you,  Madina Abdul MD on 4/30/2021 at 2:10 PM

## 2021-04-30 NOTE — TELEPHONE ENCOUNTER
S/w pt who states she noticed cloudiness to the urine for the past week or so.  Yesterday started having pain/urgency with urination.  Pain is with starting and stopping of urination.    Scheduled today at 1:20 with Dr. Abdul.    Genevieve GANDHI RN  EP Triage

## 2021-04-30 NOTE — PATIENT INSTRUCTIONS
As discussed, awaiting for lab result of your Urine exam, will contact you with the results.     =====================    Patient Education     Bladder Infection, Female (Adult)     Urine normally doesn't have any germs (bacteria) in it. But bacteria can get into the urinary tract from the skin around the rectum. Or they can travel in the blood from other parts of the body. Once they are in your urinary tract, they can cause infection in these areas:    The urethra (urethritis)    The bladder (cystitis)    The kidneys (pyelonephritis)  The most common place for an infection is in the bladder. This is called a bladder infection. This is one of the most common infections in women. Most bladder infections are easily treated. They are not serious unless the infection spreads to the kidney.  The terms bladder infection, UTI, and cystitis are often used to describe the same thing. But they are not always the same. Cystitis is an inflammation of the bladder. The most common cause of cystitis is an infection.  Symptoms  The infection causes inflammation in the urethra and bladder. This causes many of the symptoms. The most common symptoms of a bladder infection are:    Pain or burning when urinating    Having to urinate more often than normal    Urgent need to urinate    Only a small amount of urine comes out    Blood in urine    Belly (abdominal) discomfort. This is often in the lower belly above the pubic bone.    Cloudy urine    Strong- or bad-smelling urine    Unable to urinate (urinary retention)    Unable to hold urine in (urinary incontinence)    Fever    Loss of appetite    Confusion (in older adults)  Causes  Bladder infections are not contagious. You can't get one from someone else, from a toilet seat, or from sharing a bath.  The most common cause of bladder infections is bacteria from the bowels. The bacteria get onto the skin around the opening of the urethra. From there, they can get into the urine. Then they  travel up to the bladder, causing inflammation and infection. This often happens because of:    Wiping incorrectly after urinating. Always wipe from front to back.    Bowel incontinence    Pregnancy    Procedures such as having a catheter put in    Older age    Not emptying your bladder. This can give bacteria a chance to grow in your urine.    Fluid loss (dehydration)    Constipation    Having sex    Using a diaphragm for birth control   Treatment  Bladder infections are diagnosed by a urine test and urine culture. They are treated with antibiotics. They often clear up quickly without problems. Treatment helps prevent a more serious kidney infection.  Medicines  Medicines can help in the treatment of a bladder infection:    Take antibiotics until they are used up, even if you feel better. It's important to finish them to make sure the infection has cleared.    You can use acetaminophen or ibuprofen for pain, fever, or discomfort, unless another medicine was prescribed. If you have long-term (chronic) liver or kidney disease, talk with your healthcare provider before using these medicines. Also talk with your provider if you've ever had a stomach ulcer or GI (gastrointestinal) bleeding, or are taking blood-thinner medicines.    If you are given phenazopydridine to reduce burning with urination, it will make your urine a bright orange color. This can stain clothing.  Care and prevention  These self-care steps can help prevent future infections:    Drink plenty of fluids. This helps to prevent dehydration and flush out your bladder. Do this unless you must restrict fluids for other health reasons, or your healthcare provider told you not to.    Clean yourself correctly after going to the bathroom. Wipe from front to back after using the toilet. This helps prevent the spread of bacteria.    Urinate more often. Don't try to hold urine in for a long time.    Wear loose-fitting clothes and cotton underwear. Don't wear  tight-fitting pants.    Improve your diet and prevent constipation. Eat more fresh fruits and vegetables, and fiber. Eat less junk foods and fatty foods.    Don't have sex until your symptoms are gone.    Don't have caffeine, alcohol, and spicy foods. These can irritate your bladder.    Urinate right after you have sex to flush out your bladder.    If you use birth control pills and have frequent bladder infections, discuss it with your healthcare provider.  Follow-up care  Call your healthcare provider if all symptoms are not gone after 3 days of treatment. This is especially important if you have repeat infections.  If a culture was done, you will be told if your treatment needs to be changed. If directed, you can call to find out the results.  If X-rays were done, you will be told if the results will affect your treatment.  Call 911  Call 911 if any of the following occur:    Trouble breathing    Hard to wake up or confusion    Fainting (loss of consciousness)    Fast heart rate  When to get medical advice  Call your healthcare provider right away if any of these occur:    Fever of 100.4 F (38.0 C) or higher, or as directed by your healthcare provider    Symptoms are not better after 3 days of treatment    Back or belly pain that gets worse    Repeated vomiting, or unable to keep medicine down    Weakness or dizziness    Vaginal discharge    Pain, redness, or swelling in the outer vaginal area (labia)  [employer] last reviewed this educational content on 11/1/2019 2000-2021 [employer]. All rights reserved. This information is not intended as a substitute for professional medical care. Always follow your healthcare professional's instructions.

## 2021-04-30 NOTE — TELEPHONE ENCOUNTER
S/w pt and gave Dr. Abdul's reply below about lab results and abx.    Pt states understanding.  States she needs to schedule an appt in lab for fasting labs and scheduled for 5/5 at 8:45 am.    Genevieve GANDHI RN  EP Triage

## 2021-04-30 NOTE — TELEPHONE ENCOUNTER
----- Message from Madina Abdul MD sent at 4/30/2021  2:10 PM CDT -----  Your Urine exam is positive for UTI. I have sent in antibiotic to your pharmacy. Please start it off ASAP. I will update on your culture results which takes few days. Please let me know if you have any questions.    Thank you,  Madina Abdul MD on 4/30/2021 at 2:10 PM

## 2021-05-01 LAB
BACTERIA SPEC CULT: NORMAL
Lab: NORMAL
SPECIMEN SOURCE: NORMAL

## 2021-05-05 ENCOUNTER — TELEPHONE (OUTPATIENT)
Dept: FAMILY MEDICINE | Facility: CLINIC | Age: 66
End: 2021-05-05

## 2021-05-05 DIAGNOSIS — E78.5 HYPERLIPIDEMIA LDL GOAL <130: ICD-10-CM

## 2021-05-05 LAB
CHOLEST SERPL-MCNC: 220 MG/DL
HDLC SERPL-MCNC: 103 MG/DL
LDLC SERPL CALC-MCNC: 100 MG/DL
NONHDLC SERPL-MCNC: 117 MG/DL
TRIGL SERPL-MCNC: 84 MG/DL

## 2021-05-05 PROCEDURE — 36415 COLL VENOUS BLD VENIPUNCTURE: CPT | Performed by: INTERNAL MEDICINE

## 2021-05-05 PROCEDURE — 80061 LIPID PANEL: CPT | Performed by: INTERNAL MEDICINE

## 2021-05-05 NOTE — TELEPHONE ENCOUNTER
Patient Contact    Attempt # 1    Was call answered?  No.  Unavailable - call could not be connected.    On call back:     - Relay results

## 2021-05-05 NOTE — TELEPHONE ENCOUNTER
----- Message from Madina Abdul MD sent at 5/5/2021  3:00 PM CDT -----  Your Cholesterol levels are improved on current medications. Continue current medications.   Madina Abdul MD on 5/5/2021 at 3:00 PM

## 2021-05-09 DIAGNOSIS — E78.5 HYPERLIPIDEMIA LDL GOAL <130: ICD-10-CM

## 2021-05-10 RX ORDER — ROSUVASTATIN CALCIUM 5 MG/1
TABLET, COATED ORAL
Qty: 90 TABLET | Refills: 3 | Status: SHIPPED | OUTPATIENT
Start: 2021-05-10 | End: 2022-03-23

## 2021-05-10 NOTE — TELEPHONE ENCOUNTER
Prescription approved per [facility] Refill Protocol.    Genevieve GANDHI RN  EP Triage

## 2021-06-07 ENCOUNTER — TRANSFERRED RECORDS (OUTPATIENT)
Dept: HEALTH INFORMATION MANAGEMENT | Facility: CLINIC | Age: 66
End: 2021-06-07

## 2021-08-12 DIAGNOSIS — I10 BENIGN ESSENTIAL HYPERTENSION: ICD-10-CM

## 2021-08-12 RX ORDER — METOPROLOL SUCCINATE 50 MG/1
TABLET, EXTENDED RELEASE ORAL
Qty: 135 TABLET | Refills: 1 | Status: SHIPPED | OUTPATIENT
Start: 2021-08-12 | End: 2022-02-16

## 2021-08-12 NOTE — TELEPHONE ENCOUNTER
Prescription approved per [facility] Refill Protocol.  Leo Terry RN  [facility] Triage Nurse

## 2021-08-30 ENCOUNTER — TRANSFERRED RECORDS (OUTPATIENT)
Dept: HEALTH INFORMATION MANAGEMENT | Facility: CLINIC | Age: 66
End: 2021-08-30

## 2021-09-28 ENCOUNTER — IMMUNIZATION (OUTPATIENT)
Dept: FAMILY MEDICINE | Facility: CLINIC | Age: 66
End: 2021-09-28
Payer: COMMERCIAL

## 2021-09-28 DIAGNOSIS — Z23 NEED FOR PROPHYLACTIC VACCINATION AND INOCULATION AGAINST INFLUENZA: Primary | ICD-10-CM

## 2021-09-28 PROCEDURE — 90662 IIV NO PRSV INCREASED AG IM: CPT

## 2021-09-28 PROCEDURE — G0008 ADMIN INFLUENZA VIRUS VAC: HCPCS

## 2021-11-02 ENCOUNTER — E-VISIT (OUTPATIENT)
Dept: FAMILY MEDICINE | Facility: CLINIC | Age: 66
End: 2021-11-02
Payer: COMMERCIAL

## 2021-11-02 ENCOUNTER — MYC MEDICAL ADVICE (OUTPATIENT)
Dept: FAMILY MEDICINE | Facility: CLINIC | Age: 66
End: 2021-11-02

## 2021-11-02 DIAGNOSIS — N39.0 ACUTE UTI (URINARY TRACT INFECTION): Primary | ICD-10-CM

## 2021-11-02 PROCEDURE — 99422 OL DIG E/M SVC 11-20 MIN: CPT | Performed by: FAMILY MEDICINE

## 2021-11-02 RX ORDER — NITROFURANTOIN 25; 75 MG/1; MG/1
100 CAPSULE ORAL 2 TIMES DAILY
Qty: 10 CAPSULE | Refills: 0 | Status: SHIPPED | OUTPATIENT
Start: 2021-11-02 | End: 2021-11-07

## 2021-11-02 NOTE — TELEPHONE ENCOUNTER
Provider E-Visit time total (minutes): 13 min    OLIVER Aleman   Sorry to hear that you are not feeling well   I have reviewed your chart and health history   Here's some advise for you       It appears that you are concerned about the possibility of urinary tract infection.   In some circumstances, this can be diagnosed by your history but if  you are able to do another  urine analysis test before starting the treatment, that would be great.    I have ordered the urine test for you and you can do a lab only appointment, to drop a urine specimen.   Also I have  faxed treatment to your  Pharmacy, so  you can start that after giving a urine specimen at the lab, while we await results     I would also recommend doing a follow up check in the clinic if no improvement or any  ongoing problem,  so we can further evaluate and treat accordingly.     Hope you feel better     Thank you for allowing me to be involved in your health care and for choosing [facility].  If you have any questions or concerns please feel free to contact me, [phone] .  Bri House MD

## 2021-11-02 NOTE — PATIENT INSTRUCTIONS
Dear Love Pinon    After reviewing your responses, I've been able to diagnose you with a urinary tract infection, which is a common infection of the bladder with bacteria.  This is not a sexually transmitted infection, though urinating immediately after intercourse can help prevent infections.  Drinking lots of fluids is also helpful to clear your current infection and prevent the next one.      I have sent a prescription for antibiotics to your pharmacy to treat this infection.    It is important that you take all of your prescribed medication even if your symptoms are improving after a few doses.  Taking all of your medicine helps prevent the symptoms from returning.     If your symptoms worsen, you develop pain in your back or stomach, develop fevers, or are not improving in 5 days, please contact your primary care provider for an appointment or visit any of our convenient Walk-in or Urgent Care Centers to be seen, which can be found on our website here.    Thanks again for choosing us as your health care partner,    Bri House MD    Urinary Tract Infections in Women  Urinary tract infections (UTIs) are most often caused by bacteria. These bacteria enter the urinary tract. The bacteria may come from inside the body. Or they may travel from the skin outside the rectum or vagina into the urethra. Female anatomy makes it easy for bacteria from the bowel to enter a woman s urinary tract, which is the most common source of UTI. This means women develop UTIs more often than men. Pain in or around the urinary tract is a common UTI symptom. But the only way to know for sure if you have a UTI for the healthcare provider to test your urine. The two tests that may be done are the urinalysis and urine culture.     Types of UTIs    Cystitis. A bladder infection (cystitis) is the most common UTI in women. You may have urgent or frequent need to pee. You may also have pain, burning when you pee, and bloody  urine.    Urethritis. This is an inflamed urethra, which is the tube that carries urine from the bladder to outside the body. You may have lower stomach or back pain. You may also have urgent or frequent need to pee.    Pyelonephritis. This is a kidney infection. If not treated, it can be serious and damage your kidneys. In severe cases, you may need to stay in the hospital. You may have a fever and lower back pain.    Medicines to treat a UTI  Most UTIs are treated with antibiotics. These kill the bacteria. The length of time you need to take them depends on the type of infection. It may be as short as 3 days. If you have repeated UTIs, you may need a low-dose antibiotic for several months. Take antibiotics exactly as directed. Don t stop taking them until all of the medicine is gone. If you stop taking the antibiotic too soon, the infection may not go away. You may also develop a resistance to the antibiotic. This can make it much harder to treat.   Lifestyle changes to treat and prevent UTIs   The lifestyle changes below will help get rid of your UTI. They may also help prevent future UTIs.     Drink plenty of fluids. This includes water, juice, or other caffeine-free drinks. Fluids help flush bacteria out of your body.    Empty your bladder. Always empty your bladder when you feel the urge to pee. And always pee before going to sleep. Urine that stays in your bladder can lead to infection. Try to pee before and after sex as well.    Practice good personal hygiene. Wipe yourself from front to back after using the toilet. This helps keep bacteria from getting into the urethra.    Use condoms during sex. These help prevent UTIs caused by sexually transmitted bacteria. Also don't use spermicides during sex. These can increase the risk for UTIs. Choose other forms of birth control instead. For women who tend to get UTIs after sex, a low-dose of a preventive antibiotic may be used. Be sure to discuss this option with  your healthcare provider.    Follow up with your healthcare provider as directed. He or she may test to make sure the infection has cleared. If needed, more treatment may be started.  [employer] last reviewed this educational content on 7/1/2019 2000-2021 [employer]. All rights reserved. This information is not intended as a substitute for professional medical care. Always follow your healthcare professional's instructions.

## 2022-02-15 DIAGNOSIS — I10 BENIGN ESSENTIAL HYPERTENSION: ICD-10-CM

## 2022-02-16 RX ORDER — METOPROLOL SUCCINATE 50 MG/1
TABLET, EXTENDED RELEASE ORAL
Qty: 135 TABLET | Refills: 2 | Status: SHIPPED | OUTPATIENT
Start: 2022-02-16 | End: 2022-03-23

## 2022-02-16 NOTE — TELEPHONE ENCOUNTER
Prescription approved per [facility] Refill Protocol.    Genevieve GANDHI RN  EP Triage

## 2022-03-23 ENCOUNTER — OFFICE VISIT (OUTPATIENT)
Dept: FAMILY MEDICINE | Facility: CLINIC | Age: 67
End: 2022-03-23
Payer: COMMERCIAL

## 2022-03-23 VITALS
DIASTOLIC BLOOD PRESSURE: 80 MMHG | OXYGEN SATURATION: 100 % | WEIGHT: 155.4 LBS | RESPIRATION RATE: 16 BRPM | HEART RATE: 77 BPM | TEMPERATURE: 98.2 F | BODY MASS INDEX: 25.89 KG/M2 | SYSTOLIC BLOOD PRESSURE: 115 MMHG | HEIGHT: 65 IN

## 2022-03-23 DIAGNOSIS — Z86.0100 HISTORY OF COLONIC POLYPS: ICD-10-CM

## 2022-03-23 DIAGNOSIS — E04.1 THYROID NODULE: ICD-10-CM

## 2022-03-23 DIAGNOSIS — I10 BENIGN ESSENTIAL HYPERTENSION: ICD-10-CM

## 2022-03-23 DIAGNOSIS — Z12.31 BREAST CANCER SCREENING BY MAMMOGRAM: ICD-10-CM

## 2022-03-23 DIAGNOSIS — Z00.00 ROUTINE HISTORY AND PHYSICAL EXAMINATION OF ADULT: Primary | ICD-10-CM

## 2022-03-23 DIAGNOSIS — Z78.0 MENOPAUSE: ICD-10-CM

## 2022-03-23 DIAGNOSIS — H91.90 DECREASED HEARING, UNSPECIFIED LATERALITY: ICD-10-CM

## 2022-03-23 DIAGNOSIS — E78.5 HYPERLIPIDEMIA LDL GOAL <130: ICD-10-CM

## 2022-03-23 DIAGNOSIS — M81.0 AGE-RELATED OSTEOPOROSIS WITHOUT CURRENT PATHOLOGICAL FRACTURE: ICD-10-CM

## 2022-03-23 DIAGNOSIS — Z12.11 SCREEN FOR COLON CANCER: ICD-10-CM

## 2022-03-23 LAB
ALBUMIN SERPL-MCNC: 3.7 G/DL (ref 3.4–5)
ALP SERPL-CCNC: 104 U/L (ref 40–150)
ALT SERPL W P-5'-P-CCNC: 28 U/L (ref 0–50)
ANION GAP SERPL CALCULATED.3IONS-SCNC: 7 MMOL/L (ref 3–14)
AST SERPL W P-5'-P-CCNC: 25 U/L (ref 0–45)
BILIRUB SERPL-MCNC: 0.4 MG/DL (ref 0.2–1.3)
BUN SERPL-MCNC: 12 MG/DL (ref 7–30)
CALCIUM SERPL-MCNC: 9.8 MG/DL (ref 8.5–10.1)
CHLORIDE BLD-SCNC: 109 MMOL/L (ref 94–109)
CO2 SERPL-SCNC: 24 MMOL/L (ref 20–32)
CREAT SERPL-MCNC: 0.86 MG/DL (ref 0.52–1.04)
GFR SERPL CREATININE-BSD FRML MDRD: 73 ML/MIN/1.73M2
GLUCOSE BLD-MCNC: 95 MG/DL (ref 70–99)
POTASSIUM BLD-SCNC: 4.1 MMOL/L (ref 3.4–5.3)
PROT SERPL-MCNC: 7.5 G/DL (ref 6.8–8.8)
SODIUM SERPL-SCNC: 140 MMOL/L (ref 133–144)
TSH SERPL DL<=0.005 MIU/L-ACNC: 1.13 MU/L (ref 0.4–4)

## 2022-03-23 PROCEDURE — 80053 COMPREHEN METABOLIC PANEL: CPT | Performed by: FAMILY MEDICINE

## 2022-03-23 PROCEDURE — 99213 OFFICE O/P EST LOW 20 MIN: CPT | Mod: 25 | Performed by: FAMILY MEDICINE

## 2022-03-23 PROCEDURE — 36415 COLL VENOUS BLD VENIPUNCTURE: CPT | Performed by: FAMILY MEDICINE

## 2022-03-23 PROCEDURE — 99397 PER PM REEVAL EST PAT 65+ YR: CPT | Performed by: FAMILY MEDICINE

## 2022-03-23 PROCEDURE — 84443 ASSAY THYROID STIM HORMONE: CPT | Performed by: FAMILY MEDICINE

## 2022-03-23 RX ORDER — ROSUVASTATIN CALCIUM 5 MG/1
5 TABLET, COATED ORAL DAILY
Qty: 90 TABLET | Refills: 3 | Status: SHIPPED | OUTPATIENT
Start: 2022-03-23 | End: 2023-03-23

## 2022-03-23 RX ORDER — METOPROLOL SUCCINATE 50 MG/1
TABLET, EXTENDED RELEASE ORAL
Qty: 135 TABLET | Refills: 1 | Status: SHIPPED | OUTPATIENT
Start: 2022-03-23 | End: 2022-11-11

## 2022-03-23 ASSESSMENT — ENCOUNTER SYMPTOMS
CHILLS: 0
NERVOUS/ANXIOUS: 0
JOINT SWELLING: 0
PALPITATIONS: 0
HEMATOCHEZIA: 0
SHORTNESS OF BREATH: 0
EYE PAIN: 0
FEVER: 0
NAUSEA: 0
DIZZINESS: 0
CONSTIPATION: 0
FREQUENCY: 0
BREAST MASS: 0
HEADACHES: 0
HEARTBURN: 1
DIARRHEA: 0
PARESTHESIAS: 0
HEMATURIA: 0
WEAKNESS: 0
ARTHRALGIAS: 1
DYSURIA: 0
MYALGIAS: 0
SORE THROAT: 0
ABDOMINAL PAIN: 0
COUGH: 0

## 2022-03-23 ASSESSMENT — ACTIVITIES OF DAILY LIVING (ADL): CURRENT_FUNCTION: NO ASSISTANCE NEEDED

## 2022-03-23 NOTE — PROGRESS NOTES
"SUBJECTIVE:   Love Pinon is a 67 year old female who presents for Preventive Visit.      Patient has been advised of split billing requirements and indicates understanding: Yes  Are you in the first 12 months of your Medicare coverage?  No    Healthy Habits:     In general, how would you rate your overall health?  Good    Frequency of exercise:  None    Do you usually eat at least 4 servings of fruit and vegetables a day, include whole grains    & fiber and avoid regularly eating high fat or \"junk\" foods?  No    Taking medications regularly:  Yes    Medication side effects:  Muscle aches    Ability to successfully perform activities of daily living:  No assistance needed    Home Safety:  No safety concerns identified    Hearing Impairment:  Need to ask people to speak up or repeat themselves and difficulty understanding soft or whispered speech    In the past 6 months, have you been bothered by leaking of urine?  No    In general, how would you rate your overall mental or emotional health?  Good      PHQ-2 Total Score: 1    Additional concerns today:  No    Do you feel safe in your environment? Yes    Have you ever done Advance Care Planning? (For example, a Health Directive, POLST, or a discussion with a medical provider or your loved ones about your wishes): Yes, advance care planning is on file.          Hearing Acuity:may be some decrease/  possible related to  mask problem     Hearing Assessment: not done- referral given   Fall risk  Fallen 2 or more times in the past year?: No  Any fall with injury in the past year?: No    Cognitive Screening   1) Repeat 3 items (Leader, Season, Table)    2) Clock draw: NORMAL  3) 3 item recall: Recalls 3 objects  Results: 3 items recalled: COGNITIVE IMPAIRMENT LESS LIKELY    Mini-CogTM Copyright CANDELARIO Caballero. Licensed by the author for use in [facility]; reprinted with permission (jordana@.[facility]). All rights reserved.      Do you have sleep apnea, excessive " snoring or daytime drowsiness?: no    Reviewed and updated as needed this visit by clinical staff                  Reviewed and updated as needed this visit by Provider                 Social History     Tobacco Use     Smoking status: Former Smoker     Years: 18.00     Types: Cigarettes     Quit date: 1989     Years since quittin.2     Smokeless tobacco: Never Used   Substance Use Topics     Alcohol use: Yes     Alcohol/week: 3.0 standard drinks     Types: 3 Standard drinks or equivalent per week     Comment: 2/week     If you drink alcohol do you typically have >3 drinks per day or >7 drinks per week? No    Alcohol Use 12/10/2020   Prescreen: >3 drinks/day or >7 drinks/week? -   Prescreen: >3 drinks/day or >7 drinks/week? No           PROBLEMS TO ADD ON...     has noted dcrease hearing over period of time and becoming worse.  asking people to repeat etc .   Bot parents had hearing problem older age. No vertigo/ ringing or other uri sx etc       Hyperlipidemia Follow-Up      Are you regularly taking any medication or supplement to lower your cholesterol?   Yes- see epic     Are you having muscle aches or other side effects that you think could be caused by your cholesterol lowering medication?  No    Hypertension Follow-up      Do you check your blood pressure regularly outside of the clinic? Yes     Are you following a low salt diet? Yes    Are your blood pressures ever more than 140 on the top number (systolic) OR more   than 90 on the bottom number (diastolic), for example 140/90? No      Current providers sharing in care for this patient include:   Patient Care Team:  Bri House MD as PCP - General (Family Practice)  Bri House MD as Assigned PCP    The following health maintenance items are reviewed in Epic and correct as of today:  Health Maintenance Due   Topic Date Due     COLORECTAL CANCER SCREENING  09/15/2021     CMP  2021     MEDICARE ANNUAL WELLNESS VISIT   12/10/2021     FALL RISK ASSESSMENT  12/10/2021     PHQ-2 (once per calendar year)  2022     Patient Active Problem List   Diagnosis     HYPERLIPIDEMIA LDL GOAL <130     Advance care planning     BPPV (benign paroxysmal positional vertigo)     Migraine headache without aura     Hypertension goal BP (blood pressure) < 140/90     Seizure disorder (H)     Osteoporosis     Chronic idiopathic constipation     Hyponatremia     Screening for breast cancer     Nodular goiter     Cervical cancer screening     Thyroid nodule     Benign essential hypertension     Decreased hearing, bilateral     Other constipation     Past Surgical History:   Procedure Laterality Date     APPENDECTOMY       [facility]  1970    with cryotherapy     COLONOSCOPY      polyp excision, repeat  5 years      COLONOSCOPY N/A 9/15/2016    3 adenoma polyps, repeat in 3 years      D & C       HYSTEROSCOPY       TUBAL LIGATION         Social History     Tobacco Use     Smoking status: Former Smoker     Years: 18.00     Types: Cigarettes     Quit date: 1989     Years since quittin.2     Smokeless tobacco: Never Used   Substance Use Topics     Alcohol use: Yes     Alcohol/week: 3.0 standard drinks     Types: 3 Standard drinks or equivalent per week     Comment: 2/week     Family History   Problem Relation Age of Onset     Cardiovascular Paternal Grandfather          of heart attack     Hypertension Father      Lipids Father      Cancer Father         MELANOMA     Genitourinary Problems Mother      Cancer Paternal Grandmother      Asthma Sister      Asthma Sister          Pneumonia Vaccine:Adults age 65+ who received Pneumovax (PPSV23) at 65 years or older: Should be given PCV13 > 1 year after their most recent PPSV23  Mammogram Screening: Mammogram Screening: Recommended mammography every 1-2 years with patient discussion and risk factor consideration  Any new diagnosis of family breast, ovarian, or bowel cancer?  "No    FHS-7: No flowsheet data found.      Pertinent mammograms are reviewed under the imaging tab.    Review of Systems  CONSTITUTIONAL: NEGATIVE for fever, chills, change in weight  INTEGUMENTARY/SKIN: NEGATIVE for worrisome rashes, moles or lesions  EYES: NEGATIVE for vision changes or irritation  ENT/MOUTH: NEGATIVE for ear, mouth and throat problems except decrease hearing   RESP: NEGATIVE for significant cough or SOB  BREAST: NEGATIVE for masses, tenderness or discharge  CV: NEGATIVE for chest pain, palpitations or peripheral edema  GI: NEGATIVE for nausea, abdominal pain, heartburn, or change in bowel habits   female: decreased urinary stream, dysuria, frequency and hematuria  MUSCULOSKELETAL: NEGATIVE for significant arthralgias or myalgia  NEURO: NEGATIVE for weakness, dizziness or paresthesias  ENDOCRINE: NEGATIVE for temperature intolerance, skin/hair changes  HEME: NEGATIVE for bleeding problems  PSYCHIATRIC: NEGATIVE for changes in mood or affect    OBJECTIVE:   There were no vitals taken for this visit. Estimated body mass index is 25.02 kg/m  as calculated from the following:    Height as of 4/30/21: 1.676 m (5' 6\").    Weight as of 4/30/21: 70.3 kg (155 lb).  Physical Exam  GENERAL: healthy, alert and no distress  EYES: Eyes grossly normal to inspection, PERRL and conjunctivae and sclerae normal  HENT: ear canals and TM's normal, nose and mouth without ulcers or lesions  NECK: no adenopathy, no asymmetry, masses, or scars and thyroid non tender  to palpation  RESP: lungs clear to auscultation - no rales, rhonchi or wheezes  BREAST: normal without masses, tenderness or nipple discharge and no palpable axillary masses or adenopathy  CV: regular rate and rhythm, normal S1 S2, no S3 or S4,   no peripheral edema   ABDOMEN: soft, nontender, no hepatosplenomegaly, no masses and bowel sounds normal   (female): deferred  RECTAL: deferred  MS: no gross musculoskeletal defects noted, no edema  SKIN: no " suspicious lesions or rashes  NEURO: Normal strength and tone, mentation intact and speech normal  PSYCH: mentation appears normal, affect normal/bright        ASSESSMENT / PLAN:   (Z00.00) Routine history and physical examination of adult  (primary encounter diagnosis)  Comment:   Plan:     (Z12.11) Screen for colon cancer  Comment:   Plan: Adult Gastro Ref - Procedure Only,         COMPREHENSIVE METABOLIC PANEL            (I10) Benign essential hypertension  Comment:   Plan: metoprolol succinate ER (TOPROL-XL) 50 MG 24 hr        tablet        BP in adequate control. Discussed cares, low fat low salt diet etc. Check labs, call pt with results. Refill given. encouraged home BP monitoring. Follow up recheck in 6 months, sooner if problem.       (Z86.010) History of colonic polyps  Comment: 5 yr f/u was recommended   Plan: Adult Gastro Ref - Procedure Only            (E04.1) Thyroid nodule  Comment: no change , wish to get labs   Plan: TSH with free T4 reflex            (E78.5) Hyperlipidemia LDL goal <130  Comment:   Plan: rosuvastatin (CRESTOR) 5 MG tablet            (Z78.0) Menopause  Comment:   Plan: DX Hip/Pelvis/Spine            (M81.0) Age-related osteoporosis without current pathological fracture  Comment: also has fam hx of mom and mgm, maternal aunt also had osteoporosis  Plan: DX Hip/Pelvis/Spine cyst osteoporosis osteopenia diagnosis wants that she signed a bone density test just last 5 years and referral for menopause Steve declined because recently has osteoporosis will need a follow-up chest system is always going to be able to be given splenectomy obstructive liver move is still the computer    (Z12.31) Breast cancer screening by mammogram  Comment:   Plan: *MA Screening Digital Bilateral            (H91.90) Decreased hearing, unspecified laterality  Comment: Concern with both parents also having hearing issue   Plan: Otolaryngology Referral        She wish to proceed with further evaluation.   "referral given.     Check labs. refill sent.Cares and  treatment discussed follow. up if problem   Patient expressed understanding and agreement with treatment plan. All patient's questions were answered, will let me know if has more later.  Medications: Rx's: Reviewed the potential side effects/complications of medications prescribed.       COUNSELING:  Reviewed preventive health counseling, as reflected in patient instructions       Regular exercise       Healthy diet/nutrition       Vision screening       Hearing screening       Dental care       Bladder control       Fall risk prevention       Immunizations    Vaccination utd               Alcohol Use        Osteoporosis prevention/bone health       Colon cancer screening       Advanced Planning     Estimated body mass index is 25.02 kg/m  as calculated from the following:    Height as of 4/30/21: 1.676 m (5' 6\").    Weight as of 4/30/21: 70.3 kg (155 lb).        She reports that she quit smoking about 32 years ago. Her smoking use included cigarettes. She quit after 18.00 years of use. She has never used smokeless tobacco.      Appropriate preventive services were discussed with this patient, including applicable screening as appropriate for cardiovascular disease, diabetes, osteopenia/osteoporosis, and glaucoma.  As appropriate for age/gender, discussed screening for colorectal cancer, prostate cancer, breast cancer, and cervical cancer. Checklist reviewing preventive services available has been given to the patient.    Reviewed patients plan of care and provided an AVS. The Basic Care Plan (routine screening as documented in Health Maintenance) for Love meets the Care Plan requirement. This Care Plan has been established and reviewed with the Patient.    Counseling Resources:  ATP IV Guidelines  Pooled Cohorts Equation Calculator  Breast Cancer Risk Calculator  Breast Cancer: Medication to Reduce Risk  FRAX Risk Assessment  ICSI Preventive " Guidelines  Dietary Guidelines for Americans, 2010  USDA's MyPlate  ASA Prophylaxis  Lung CA Screening    Bri House MD  [facility]

## 2022-03-23 NOTE — PATIENT INSTRUCTIONS
Patient Education     Prevention Guidelines, Women Ages 65 and Older  Screening tests and vaccines are an important part of managing your health. A screening test is done to find possible disorders or diseases in people who don't have any symptoms. The goal is to find a disease early so lifestyle changes can be made and you can be watched more closely to reduce the risk of disease, or to detect it early enough to treat it most effectively. Screening tests are not considered diagnostic, but are used to determine if more testing is needed. Health counseling is essential, too. Below are guidelines for these, for women ages 65 and older. Talk with your healthcare provider to make sure you re up to date on what you need.  Screening Who needs it How often   Type 2 diabetes or prediabetes All women beginning at age 45 and women without symptoms at any age who are overweight or obese and have 1 or more additional risk factors for diabetes At least every 3 years   Type 2 diabetes All women with prediabetes Every year   Unhealthy alcohol use All women in this age group At routine exams   Blood pressure All women in this age group Yearly checkup if your blood pressure is normal  Normal blood pressure is less than 120/80 mm Hg  If your blood pressure reading is higher than normal, follow the advice of your healthcare provider   Breast cancer All women of average risk Mammograms should be done every 1 or 2 years. Talk with your healthcare provider about your risk factors and how often you need the test and for how long.   Cervical cancer Only women who had abnormal screening results before age 65 Talk with your healthcare provider   Chlamydia Women at increased risk for infection At routine exams   Colorectal cancer All women at average risk in this age group through age 75 who are in good health. For women ages 76 to 85, talk with your healthcare provider about whether to continue screening. For women 85 and older, screening  is not needed. Multiple tests are available and are used at different times. Possible tests include:    Flexible sigmoidoscopy every 5 years, or    Colonoscopy every 10 years, or    CT colonography (virtual colonoscopy) every 5 years, or    Yearly fecal occult blood test, or    Yearly fecal immunochemical test every year, or    Stool DNA test, every 3 years  If you choose a test other than a colonoscopy and have an abnormal test result, you will need to follow-up with a colonoscopy. Talk with your healthcare provider which test is best for you.  Some people should be screened using a different schedule because of their personal or family health history. Talk with your healthcare provider about your health history.   Depression All women in this age group At routine exams   Gonorrhea Sexually active women at increased risk for infection At yearly routine exams   Hepatitis C Anyone at increased risk; 1 time for those born between 1945 and 1965 At routine exams   High cholesterol or triglycerides All women in this age group who are at risk for coronary artery disease At least every 5 years; talk with your healthcare provider about your risk   HIV Women at increased risk for infection At routine exams; talk with your healthcare provider about your risk   Lung cancer Adults ages 55 to 74 who are in fairly good health and are at higher risk for lung cancer    Currently smoke or have quit within the past 15 years    30-pack year smoking history  Eligibility criteria and age limit (possibly up to age 80) may vary across major organizations Yearly lung cancer screening with a low dose CT scan (LDCT); talk with your healthcare provider   Obesity All women in this age group At yearly routine exams   Osteoporosis All women in this age group Routinely done every 2 years, but repeat as advised by your healthcare provider   Syphilis Women at increased risk for infection At routine exams; talk with your healthcare provider    Thyroid-stimulating hormone (TSH) Only women in this age group with symptoms of thyroid dysfunction Talk with your healthcare provider   Tuberculosis Women at increased risk for infection Talk with your healthcare provider   Vision All women in this age group Every 1 to 2 years; if you have a chronic health condition, ask your healthcare provider if you need exams more often   Vaccine Who needs it How often   Chickenpox (varicella) All women in this age group who have no record of this infection or vaccine 2 doses; second dose should be given at least 4 weeks after the first dose   Hepatitis A Women at increased risk for infection 2 or 3 doses (depending on the vaccine) given at least 6 months apart; talk with your healthcare provider   Hepatitis B Women at increased risk for infection 2 or 3 doses (depending on the vaccine); second dose should be given 1 month after the first dose; if a the third dose, it should be given at least 2 months after the second dose and at least 4 months after the first dose   Haemophilus influenza type B (HIB) Women at increased risk for infection 1 to 3 doses; talk with your healthcare provider   Influenza (flu) All women in this age group Once a year   Pneumococcal conjugate vaccine (PCV13) and pneumococcal polysaccharide vaccine (PPSV23) All women in this age group  PPSV 23: women who have not been vaccinated or have not had infection  PCV 13: women at increased risk for infection PPSV: 1 dose at age 65 or older  PCV 13: 1 dose at age 65 or older; talk with your healthcare provider   Tetanus/diphtheria/pertussis (Td/Tdap) booster All women in this age group Td every 10 years, or a 1-time dose of Tdap instead of a Td booster after age 18, then Td every 10 years   Zoster (shingles) All women in this age group 2 vaccines are available:    Recombinant zoster vaccine (RZV; Shigrix) is recommended as the preferred shingles vaccine. It's given in a series of 2 doses. The 2nd dose is given  2 to 6 months after the first. This is given even if you've had shingles before or had a previous zoster live vaccine.    Zoster live vaccine live (ZVL; Zostavax) may be given to healthy adults over age 60. It's given in one dose.   Counseling Who needs it How often   Diet and exercise Women who are overweight or obese When diagnosed, and then at routine exams   Fall prevention (exercise and vitamin D supplements) All women in this age group At yearly routine exams   Sexually transmitted infection prevention Women at increased risk for infection-talk with your healthcare provider At routine exams   Use of daily aspirin Women up to age 70 who are at high risk for cardiovascular problems and not at increased risk for bleeding as identified by your healthcare provider When your risk is known   Use of tobacco and the health effects it can cause All women in this age group Every exam   Ger last reviewed this educational content on 7/1/2020 2000-2021 [employer]. All rights reserved. This information is not intended as a substitute for professional medical care. Always follow your healthcare professional's instructions.

## 2022-03-29 ENCOUNTER — TELEPHONE (OUTPATIENT)
Dept: GASTROENTEROLOGY | Facility: CLINIC | Age: 67
End: 2022-03-29
Payer: COMMERCIAL

## 2022-03-29 DIAGNOSIS — Z11.59 ENCOUNTER FOR SCREENING FOR OTHER VIRAL DISEASES: Primary | ICD-10-CM

## 2022-03-29 NOTE — TELEPHONE ENCOUNTER
Screening Questions  BlueKIND OF PREP RedLOCATION [review exclusion criteria] GreenSEDATION TYPE  1. Have you had a positive covid test in the last 90 days? n     2. Are you active on mychart? Y    3. What insurance is in the chart? [employer]     3.   Ordering/Referring Provider: Bri House MD in [facility] FP/IM/PEDS    4. BMI 25.8 [BMI OVER 40-EXTENDED PREP]  If greater than 40 review exclusion criteria [PAC APPT IF @ [facility]]        5.  Respiratory Screening :  [If yes to any of the following HOSPITAL setting only]     Do you use daily home oxygen? N    Do you have mod to severe Obstructive Sleep Apnea? N  [OKAY @ [facility] SH PH RI]   Do you have Pulmonary Hypertension? N     Do you have UNCONTROLLED asthma? N        6.   Have you had a heart or lung transplant? N      7.   Are you currently on dialysis? N [ If yes, G-PREP & HOSPITAL setting only]     8.   Do you have chronic kidney disease? N [ If yes, G-PREP ]    9.   Have you had a stroke or Transient ischemic attack (TIA - aka  mini stroke ) within 6 months?  N (If yes, please review exclusion criteria)    10.   In the past 6 months, have you had any heart related issues including cardiomyopathy or heart attack? N           If yes, did it require cardiac stenting or other implantable device? N      11.   Do you have any implantable devices in your body (pacemaker, defib, LVAD)? N (If yes, please review exclusion criteria)    12.   Do you take nitroglycerin? N           If yes, how often? N  (if yes, HOSPITAL setting ONLY)    13.   Are you currently taking any blood thinners? N           [IF YES, INFORM PATIENT TO FOLLOW UP W/ ORDERING PROVIDER FOR BRIDGING INSTRUCTIONS]     14.   Do you have a diagnosis of diabetes? N   [ If yes, G-PREP ]    15.   [FEMALES] Are you currently pregnant?     If yes, how many weeks?     16.   Are you taking any prescription pain medications on a routine schedule?  N [ If yes, EXTENDED PREP.] [If yes, MAC]    17.   Do you have  any chemical dependencies such as alcohol, street drugs, or methadone?  N [If yes, MAC]    18.   Do you have any history of post-traumatic stress syndrome, severe anxiety or history of psychosis?  N  [If yes, MAC]    19.   Do you have a significant intellectual disability?  N [If yes, MAC]    20.   Do you transfer independently?  Y    21.  On a regular basis do you go 3-5 days between bowel movements? N   [ If yes, EXTENDED PREP.]    22.   Preferred LOCAL Pharmacy for Pre Prescription      [id number] - [place], [place] - [address] AT [place] [address]  [id number] - [place], [place] - [address] [facility] [address] [id number] - [place] [address] [id number] - [place], [place] - [address] AT [place] [address] & [address]  [id number] - [place], [place] - [address]      Scheduling Details      Caller : Love  (Please ask for phone number if not scheduled by patient)    Type of Procedure Scheduled: colonoscopy  Which Colonoscopy Prep was Sent?: miralax  KHORUTS CF PATIENTS & GROEN'S PATIENTS NEEDS EXTENDED PREP  Surgeon: zaheer  Date of Procedure: 4/29  Location:       Sedation Type: CS  Conscious Sedation- Needs  for 6 hours after the procedure  MAC/General-Needs  for 24 hours after procedure    Pre-op Required at [facility] [place], [place] and OR for MAC sedation: n  (advise patient they will need a pre-op prior to procedure -)      Informed patient they will need an adult  y  Cannot take any type of public or medical transportation alone    Pre-Procedure Covid test to be [facility] or Externally: ox 4/25    Confirmed Nurse will call to complete assessment y    Additional comments: none

## 2022-04-01 ENCOUNTER — HOSPITAL ENCOUNTER (OUTPATIENT)
Dept: MAMMOGRAPHY | Facility: CLINIC | Age: 67
Discharge: HOME | End: 2022-04-01
Attending: FAMILY MEDICINE
Payer: COMMERCIAL

## 2022-04-01 ENCOUNTER — HOSPITAL ENCOUNTER (OUTPATIENT)
Dept: BONE DENSITY | Facility: CLINIC | Age: 67
Discharge: HOME | End: 2022-04-01
Attending: FAMILY MEDICINE
Payer: COMMERCIAL

## 2022-04-01 DIAGNOSIS — Z12.31 BREAST CANCER SCREENING BY MAMMOGRAM: ICD-10-CM

## 2022-04-01 DIAGNOSIS — Z78.0 MENOPAUSE: ICD-10-CM

## 2022-04-01 PROCEDURE — 77080 DXA BONE DENSITY AXIAL: CPT

## 2022-04-01 PROCEDURE — 77067 SCR MAMMO BI INCL CAD: CPT

## 2022-04-19 NOTE — TELEPHONE ENCOUNTER
"Requested Prescriptions   Pending Prescriptions Disp Refills     simvastatin (ZOCOR) 40 MG tablet  Last Written Prescription Date:  2/22/17  Last Fill Quantity: 90,  # refills: 3   Last office visit: 4/10/2018 with prescribing provider:  Harsh   Future Office Visit:     90 tablet 3     Sig: Take 1 tablet (40 mg) by mouth At Bedtime    Statins Protocol Failed    6/6/2018 10:24 AM       Failed - LDL on file in past 12 months    Recent Labs   Lab Test  02/22/17   0847   LDL  101*            Passed - No abnormal creatine kinase in past 12 months    No lab results found.            Passed - Recent (12 mo) or future (30 days) visit within the authorizing provider's specialty    Patient had office visit in the last 12 months or has a visit in the next 30 days with authorizing provider or within the authorizing provider's specialty.  See \"Patient Info\" tab in inbasket, or \"Choose Columns\" in Meds & Orders section of the refill encounter.           Passed - Patient is age 18 or older       Passed - No active pregnancy on record       Passed - No positive pregnancy test in past 12 months          " DISPLAY PLAN FREE TEXT

## 2022-04-25 ENCOUNTER — LAB (OUTPATIENT)
Dept: URGENT CARE | Facility: URGENT CARE | Age: 67
End: 2022-04-25
Payer: COMMERCIAL

## 2022-04-25 DIAGNOSIS — Z11.59 ENCOUNTER FOR SCREENING FOR OTHER VIRAL DISEASES: ICD-10-CM

## 2022-04-25 LAB — SARS-COV-2 RNA RESP QL NAA+PROBE: NEGATIVE

## 2022-04-25 PROCEDURE — U0005 INFEC AGEN DETEC AMPLI PROBE: HCPCS

## 2022-04-25 PROCEDURE — U0003 INFECTIOUS AGENT DETECTION BY NUCLEIC ACID (DNA OR RNA); SEVERE ACUTE RESPIRATORY SYNDROME CORONAVIRUS 2 (SARS-COV-2) (CORONAVIRUS DISEASE [COVID-19]), AMPLIFIED PROBE TECHNIQUE, MAKING USE OF HIGH THROUGHPUT TECHNOLOGIES AS DESCRIBED BY CMS-2020-01-R: HCPCS

## 2022-04-29 ENCOUNTER — HOSPITAL ENCOUNTER (OUTPATIENT)
Facility: CLINIC | Age: 67
Discharge: HOME | End: 2022-04-29
Attending: SURGERY | Admitting: SURGERY
Payer: COMMERCIAL

## 2022-04-29 VITALS
RESPIRATION RATE: 26 BRPM | BODY MASS INDEX: 25.83 KG/M2 | SYSTOLIC BLOOD PRESSURE: 117 MMHG | HEART RATE: 59 BPM | OXYGEN SATURATION: 92 % | WEIGHT: 155 LBS | DIASTOLIC BLOOD PRESSURE: 80 MMHG | HEIGHT: 65 IN

## 2022-04-29 LAB — COLONOSCOPY: NORMAL

## 2022-04-29 PROCEDURE — G0500 MOD SEDAT ENDO SERVICE >5YRS: HCPCS | Performed by: SURGERY

## 2022-04-29 PROCEDURE — 250N000011 HC RX IP 250 OP 636: Performed by: SURGERY

## 2022-04-29 PROCEDURE — 45380 COLONOSCOPY AND BIOPSY: CPT | Mod: PT | Performed by: SURGERY

## 2022-04-29 PROCEDURE — 88305 TISSUE EXAM BY PATHOLOGIST: CPT | Mod: TC | Performed by: SURGERY

## 2022-04-29 PROCEDURE — 99152 MOD SED SAME PHYS/QHP 5/>YRS: CPT | Mod: PT | Performed by: SURGERY

## 2022-04-29 PROCEDURE — 99153 MOD SED SAME PHYS/QHP EA: CPT

## 2022-04-29 PROCEDURE — 45380 COLONOSCOPY AND BIOPSY: CPT | Performed by: SURGERY

## 2022-04-29 PROCEDURE — 99153 MOD SED SAME PHYS/QHP EA: CPT | Mod: PT | Performed by: SURGERY

## 2022-04-29 RX ORDER — LIDOCAINE 40 MG/G
CREAM TOPICAL
Status: DISCONTINUED | OUTPATIENT
Start: 2022-04-29 | End: 2022-04-29 | Stop reason: HOSPADM

## 2022-04-29 RX ORDER — FENTANYL CITRATE 50 UG/ML
INJECTION, SOLUTION INTRAMUSCULAR; INTRAVENOUS PRN
Status: COMPLETED | OUTPATIENT
Start: 2022-04-29 | End: 2022-04-29

## 2022-04-29 RX ORDER — ONDANSETRON 2 MG/ML
4 INJECTION INTRAMUSCULAR; INTRAVENOUS
Status: DISCONTINUED | OUTPATIENT
Start: 2022-04-29 | End: 2022-04-29 | Stop reason: HOSPADM

## 2022-04-29 RX ADMIN — MIDAZOLAM 2 MG: 1 INJECTION INTRAMUSCULAR; INTRAVENOUS at 08:55

## 2022-04-29 RX ADMIN — FENTANYL CITRATE 100 MCG: 50 INJECTION, SOLUTION INTRAMUSCULAR; INTRAVENOUS at 08:55

## 2022-04-29 NOTE — OP NOTE
General Surgery Brief Operative Note    Preoperative Diagnosis- History of polyps    Postoperative Diagnosis- Same    Procedure- Colonoscopy with biopsy X 3 of polyps    Surgeon- Manny    Anesthesia- Conscious sedation for 25 minutes    EBL- Minimal    Findings- 3- 2-3 mm polyps    Specimen- Polyps    Complications- None    Luis Bryant M.D.  [facility]  [phone]

## 2022-04-29 NOTE — H&P
[facility] Anesthesia Pre-op History and Physical    Love Pinon MRN# [id number]   Age: 67 year old YOB: 1955      Date of Surgery: 4/29/2022 [facility]      Date of Exam 4/29/2022 Facility (Same day)     Primary care provider: Bri House         Chief Complaint and/or Reason for Procedure:   History of polyps       Active problem list:     Patient Active Problem List    Diagnosis Date Noted     Decreased hearing, bilateral 12/10/2020     Priority: Medium     Other constipation 12/10/2020     Priority: Medium     Benign essential hypertension 05/20/2019     Priority: Medium     Thyroid nodule 04/15/2019     Priority: Medium     Cervical cancer screening 02/28/2019     Priority: Medium     Pt needs 2 Normal Pap/Neg HPV results or 3 Normal paps within the past 10 years, with the last one being in the last 5 years, before pap screening can be discontinued.    Pt's age: 63 will be 64 on 03/18/19.  Normal paps in   2009 NIL, 2011 NIL, 2012 NIL, 2016 NIL and 02/20/19: NIL pap, Neg HR HPV result.   No history of SURI 2 or greater found in epic.   No further cervical cancer screening recommended per provider.   Hm updated.                Nodular goiter 02/26/2019     Priority: Medium     Multiple complex/ solid nodule, pt asymptomatic        Breast cancer screening by mammogram 02/20/2019     Priority: Medium     Hyponatremia 02/22/2018     Priority: Medium     Osteoporosis 02/22/2017     Priority: Medium     also has fam hx of mom and mgm, matrenal aunt also had osteoporosis       Chronic idiopathic constipation 02/22/2017     Priority: Medium     Hypertension goal BP (blood pressure) < 140/90 04/30/2014     Priority: Medium     Migraine headache without aura      Priority: Medium     Seizure disorder (H)      Priority: Medium     one seizure-grandmal in 1989       BPPV (benign paroxysmal positional vertigo) 10/26/2012     Priority: Medium     Advance care  "planning 09/01/2011     Priority: Medium     Advance Care Planning 2/17/2016: Receipt of ACP document:  Received: Health Care Directive which was witnessed or notarized on 09/29/2013.  Document not previously scanned.  Validation form completed and sent with document to be scanned.  Code Status needs to be updated to reflect choices in most recent ACP document.  Confirmed/documented designated decision maker(s).  Added by Linda Delgado  Advance Care Planning 9/1/11:  Discussed advance care planning with patient; information given to patient to review. Sherrie Schmitt CMA            HYPERLIPIDEMIA LDL GOAL <130 10/31/2010     Priority: Medium            Medications (include herbals and vitamins):   Any Plavix use in the last 7 days? No     Current Facility-Administered Medications   Medication     lidocaine (LMX4) cream     lidocaine 1 % 0.1-1 mL     ondansetron (ZOFRAN) injection 4 mg     sodium chloride (PF) 0.9% PF flush 3 mL     sodium chloride (PF) 0.9% PF flush 3 mL             Allergies:      Allergies   Allergen Reactions     Aramine [Metaraminol Bitartrate]      Pcn [Penicillins]      Hives, swelling          Physical Exam:   All vitals have been reviewed  Patient Vitals for the past 8 hrs:   BP Pulse SpO2 Height Weight   04/29/22 0828 (!) 150/100 76 100 % 1.651 m (5' 5\") 70.3 kg (155 lb)     No intake/output data recorded.  Airway assessment:   Patient is able to open mouth wide  Patient is able to stick out tongue}      Lungs:   No increased work of breathing, good air exchange, clear to auscultation bilaterally, no crackles or wheezing     Cardiovascular:   normal apical pulses              Lab / Radiology Results:   Reviewed         Anesthetic risk and/or ASA classification:   ASA Class 2    Luis Bryant MD     "

## 2022-05-02 LAB
PATH REPORT.COMMENTS IMP SPEC: NORMAL
PATH REPORT.COMMENTS IMP SPEC: NORMAL
PATH REPORT.FINAL DX SPEC: NORMAL
PATH REPORT.GROSS SPEC: NORMAL
PATH REPORT.MICROSCOPIC SPEC OTHER STN: NORMAL
PATH REPORT.RELEVANT HX SPEC: NORMAL
PHOTO IMAGE: NORMAL

## 2022-05-02 PROCEDURE — 88305 TISSUE EXAM BY PATHOLOGIST: CPT | Mod: 26 | Performed by: PATHOLOGY

## 2022-06-23 ENCOUNTER — OFFICE VISIT (OUTPATIENT)
Dept: FAMILY MEDICINE | Facility: CLINIC | Age: 67
End: 2022-06-23
Payer: COMMERCIAL

## 2022-06-23 ENCOUNTER — TELEPHONE (OUTPATIENT)
Dept: FAMILY MEDICINE | Facility: CLINIC | Age: 67
End: 2022-06-23

## 2022-06-23 ENCOUNTER — NURSE TRIAGE (OUTPATIENT)
Dept: FAMILY MEDICINE | Facility: CLINIC | Age: 67
End: 2022-06-23

## 2022-06-23 VITALS
DIASTOLIC BLOOD PRESSURE: 89 MMHG | SYSTOLIC BLOOD PRESSURE: 135 MMHG | WEIGHT: 155 LBS | OXYGEN SATURATION: 96 % | BODY MASS INDEX: 25.79 KG/M2 | HEART RATE: 65 BPM | TEMPERATURE: 98.7 F

## 2022-06-23 DIAGNOSIS — R07.0 THROAT PAIN: Primary | ICD-10-CM

## 2022-06-23 LAB
DEPRECATED S PYO AG THROAT QL EIA: NEGATIVE
GROUP A STREP BY PCR: NOT DETECTED

## 2022-06-23 PROCEDURE — 99213 OFFICE O/P EST LOW 20 MIN: CPT | Performed by: PHYSICIAN ASSISTANT

## 2022-06-23 PROCEDURE — 87651 STREP A DNA AMP PROBE: CPT | Performed by: PHYSICIAN ASSISTANT

## 2022-06-23 ASSESSMENT — ENCOUNTER SYMPTOMS
RHINORRHEA: 1
SORE THROAT: 1
ABDOMINAL PAIN: 1
FEVER: 0
COUGH: 0

## 2022-06-23 NOTE — TELEPHONE ENCOUNTER
Sore throat  6/22/2022 12:00 PM Reply    To: EC TRIAGE      From: Love Pinon      Created: 6/22/2022 12:00 PM        *-*-*This message has not been handled.*-*-*    I've had a very sore throat and difficulty swallowing for about 3 days.  I have taken antigen rapid test and gotten negative results for Covid yesterday 9pm.  I'm feeling I may have strep throat and would like to schedule with the lab.  Appreciate advice.  Thanks.        Patient Contact     Attempt # 1     Was call answered?    Yes  Pt's peer answered the phone and stated pt was caught up at the moment but will call the clinic back when able and speak to a nurse. Number given [phone].      On call back:      -Triage symptoms, see [place] message above.     Dilia SORIANO RN  [facility]

## 2022-06-23 NOTE — TELEPHONE ENCOUNTER
"Nurse Triage SBAR    Is this a 2nd Level Triage? NO    Situation: Patient calling with \"severe throat pain\" for 3 days. One side of throat only. Negative Covid home antigen test.     Background:  No known strep or Covid exposure.     Assessment: Severe pharyngitis.     Protocol Recommended Disposition:   See Today In Office    Recommendation: not routed.      not routed    Does the patient meet one of the following criteria for ADS visit consideration? 16+ years old, with an [facility] PCP     TIP  Providers, please consider if this condition is appropriate for management at one of our Acute and Diagnostic Services sites.     If patient is a good candidate, please use dotphrase <dot>triageresponse and select Refer to ADS to document.      Reason for Disposition    SEVERE sore throat pain    Additional Information    Negative: Severe difficulty breathing (e.g., struggling for each breath, speaks in single words)    Negative: Sounds like a life-threatening emergency to the triager    Negative: Throat culture results, call about    Negative: Productive cough is the main symptom    Negative: Runny nose is the main symptom    Negative: Drooling or spitting out saliva (because can't swallow)    Negative: Unable to open mouth completely    Negative: Drinking very little and has signs of dehydration (e.g., no urine > 12 hours, very dry mouth, very lightheaded)    Negative: Patient sounds very sick or weak to the triager    Negative: Difficulty breathing (per caller) but not severe    Negative: Fever > 103 F (39.4 C)    Negative: Refuses to drink anything for > 12 hours    Answer Assessment - Initial Assessment Questions  1. ONSET: \"When did the throat start hurting?\" (Hours or days ago)       Monday 6/21  2. SEVERITY: \"How bad is the sore throat?\" (Scale 1-10; mild, moderate or severe)    - MILD (1-3):  doesn't interfere with eating or normal activities    - MODERATE (4-7): interferes with eating some solids and normal " "activities    - SEVERE (8-10):  excruciating pain, interferes with most normal activities    - SEVERE DYSPHAGIA: can't swallow liquids, drooling      7/10, difficult to even swallow saliva, not drooling and can swallow water  3. STREP EXPOSURE: \"Has there been any exposure to strep within the past week?\" If so, ask: \"What type of contact occurred?\"       No known exposure to strep.   4.  VIRAL SYMPTOMS: \"Are there any symptoms of a cold, such as a runny nose, cough, hoarse voice or red eyes?\"       Runny (had all spring) Confined to side of throat.  5. FEVER: \"Do you have a fever?\" If so, ask: \"What is your temperature, how was it measured, and when did it start?\"      No fever  6. PUS ON THE TONSILS: \"Is there pus on the tonsils in the back of your throat?\"      Can't see throat.  7. OTHER SYMPTOMS: \"Do you have any other symptoms?\" (e.g., difficulty breathing, headache, rash)      none  8. PREGNANCY: \"Is there any chance you are pregnant?\" \"When was your last menstrual period?\"      NA    Protocols used: SORE THROAT-A-ALIS Vásquez RN    "

## 2022-06-23 NOTE — PROGRESS NOTES
Assessment & Plan:        ICD-10-CM    1. Throat pain  R07.0 Streptococcus A Rapid Scr w Reflx to PCR     Group A Streptococcus PCR Throat Swab         Plan/Clinical Decision Making:    Patient having ST x 3-4 days, negative strep. Strep PCR pending.   Normal exam.   Negative home covid test.   Has hx of seasonal allergies with chronic congestion, no increase allergy symptoms or worsening ENT symptoms.      Return if symptoms worsen or fail to improve.     At the end of the encounter, I discussed results, diagnosis, medications. Discussed red flags for immediate return to clinic/ER, as well as indications for follow up if no improvement. Patient understood and agreed to plan. Patient was stable for discharge.        Negra Greene PA-C on 6/23/2022 at 2:36 PM          Subjective:     HPI:    Love is a 67 year old female who presents to clinic today for the following health issues:  Chief Complaint   Patient presents with     Pharyngitis     3 days     HPI    Patient complains of ST 3-4 days.   Last had strep several years ago.   ST has been persistent.   Has allergy symptoms, but at baseline, no new symptoms or worsening symptoms with recent ST.   Pain with swallowing.   No known exposure to anything.   Had home COVID test on Tuesday, negative.     History obtained from the patient.    Review of Systems   Constitutional: Negative for fever.   HENT: Positive for rhinorrhea and sore throat.    Respiratory: Negative for cough.    Gastrointestinal: Positive for abdominal pain (no reflux).         Patient Active Problem List   Diagnosis     HYPERLIPIDEMIA LDL GOAL <130     Advance care planning     BPPV (benign paroxysmal positional vertigo)     Migraine headache without aura     Hypertension goal BP (blood pressure) < 140/90     Seizure disorder (H)     Osteoporosis     Chronic idiopathic constipation     Hyponatremia     Breast cancer screening by mammogram     Nodular goiter     Cervical cancer screening      Thyroid nodule     Benign essential hypertension     Decreased hearing, bilateral     Other constipation        Past Medical History:   Diagnosis Date     BPPV (benign paroxysmal positional vertigo) 10/26/2012     Herpes genitalia      Hyperlipidemia      Hypertension      Iron deficiency anemia secondary to blood loss (chronic)     menorrhagia     Migraine headache without aura      Seizure disorder (H)     one seizure-grandmal in        Social History     Tobacco Use     Smoking status: Former Smoker     Years: 18.00     Types: Cigarettes     Quit date: 1989     Years since quittin.4     Smokeless tobacco: Never Used   Substance Use Topics     Alcohol use: Yes     Alcohol/week: 3.0 standard drinks     Types: 3 Standard drinks or equivalent per week     Comment: 2/week             Objective:     Vitals:    22 1409   BP: 135/89   BP Location: Right arm   Patient Position: Sitting   Cuff Size: Adult Regular   Pulse: 65   Temp: 98.7  F (37.1  C)   TempSrc: Oral   SpO2: 96%   Weight: 70.3 kg (155 lb)         Physical Exam   EXAM:   Pleasant, alert, appropriate appearance. NAD.  Head Exam: Normocephalic, atraumatic.  Eye Exam:   non icteric/injection.    Ear Exam: TMs grey without bulging. Normal canals.  Normal pinna.  Nose Exam: Normal external nose.    OroPharynx Exam:  Moist mucous membranes. slight erythema, pharynx without exudate or hypertrophy.  Neck/Thyroid Exam:  No LAD.    Chest/Respiratory Exam: CTAB.  Cardiovascular Exam: RRR. No murmur or rubs.      Results:  Results for orders placed or performed in visit on 22   Streptococcus A Rapid Scr w Reflx to PCR     Status: Normal    Specimen: Throat; Swab   Result Value Ref Range    Group A Strep antigen Negative Negative

## 2022-06-27 NOTE — TELEPHONE ENCOUNTER
Pt seen in clinic for throat pain on 6/23/22. No further action necessary.    Sierra ESTEVES RN  EP Triage

## 2022-11-10 DIAGNOSIS — I10 BENIGN ESSENTIAL HYPERTENSION: ICD-10-CM

## 2022-11-11 RX ORDER — METOPROLOL SUCCINATE 50 MG/1
TABLET, EXTENDED RELEASE ORAL
Qty: 135 TABLET | Refills: 0 | Status: SHIPPED | OUTPATIENT
Start: 2022-11-11 | End: 2023-01-26

## 2022-11-11 NOTE — TELEPHONE ENCOUNTER
Prescription approved per [facility] Refill Protocol.  Tiny Palmer RN  [facility]

## 2023-01-26 DIAGNOSIS — I10 BENIGN ESSENTIAL HYPERTENSION: ICD-10-CM

## 2023-01-26 RX ORDER — METOPROLOL SUCCINATE 50 MG/1
TABLET, EXTENDED RELEASE ORAL
Qty: 135 TABLET | Refills: 0 | OUTPATIENT
Start: 2023-01-26

## 2023-01-26 RX ORDER — METOPROLOL SUCCINATE 50 MG/1
TABLET, EXTENDED RELEASE ORAL
Qty: 135 TABLET | Refills: 0 | Status: SHIPPED | OUTPATIENT
Start: 2023-01-26 | End: 2023-03-23

## 2023-01-26 NOTE — TELEPHONE ENCOUNTER
Patient is needing metoprolol refilled she will need it early due to going out of town for a month

## 2023-03-23 ENCOUNTER — OFFICE VISIT (OUTPATIENT)
Dept: FAMILY MEDICINE | Facility: CLINIC | Age: 68
End: 2023-03-23
Payer: COMMERCIAL

## 2023-03-23 VITALS
SYSTOLIC BLOOD PRESSURE: 138 MMHG | TEMPERATURE: 97.3 F | BODY MASS INDEX: 26.26 KG/M2 | DIASTOLIC BLOOD PRESSURE: 87 MMHG | HEIGHT: 65 IN | OXYGEN SATURATION: 98 % | WEIGHT: 157.6 LBS | HEART RATE: 67 BPM

## 2023-03-23 DIAGNOSIS — I10 BENIGN ESSENTIAL HYPERTENSION: ICD-10-CM

## 2023-03-23 DIAGNOSIS — R82.90 CLOUDY URINE: ICD-10-CM

## 2023-03-23 DIAGNOSIS — Z01.818 PREOP GENERAL PHYSICAL EXAM: Primary | ICD-10-CM

## 2023-03-23 DIAGNOSIS — E78.5 HYPERLIPIDEMIA LDL GOAL <130: ICD-10-CM

## 2023-03-23 DIAGNOSIS — H26.9 CATARACT OF BOTH EYES, UNSPECIFIED CATARACT TYPE: ICD-10-CM

## 2023-03-23 LAB
ALBUMIN SERPL BCG-MCNC: 4.5 G/DL (ref 3.5–5.2)
ALBUMIN UR-MCNC: NEGATIVE MG/DL
ALP SERPL-CCNC: 103 U/L (ref 35–104)
ALT SERPL W P-5'-P-CCNC: 17 U/L (ref 10–35)
ANION GAP SERPL CALCULATED.3IONS-SCNC: 11 MMOL/L (ref 7–15)
APPEARANCE UR: CLEAR
AST SERPL W P-5'-P-CCNC: 28 U/L (ref 10–35)
BACTERIA #/AREA URNS HPF: ABNORMAL /HPF
BILIRUB SERPL-MCNC: 0.3 MG/DL
BILIRUB UR QL STRIP: NEGATIVE
BUN SERPL-MCNC: 16.5 MG/DL (ref 8–23)
CALCIUM SERPL-MCNC: 10 MG/DL (ref 8.8–10.2)
CHLORIDE SERPL-SCNC: 106 MMOL/L (ref 98–107)
CHOLEST SERPL-MCNC: 210 MG/DL
COLOR UR AUTO: YELLOW
CREAT SERPL-MCNC: 0.91 MG/DL (ref 0.51–0.95)
DEPRECATED HCO3 PLAS-SCNC: 24 MMOL/L (ref 22–29)
GFR SERPL CREATININE-BSD FRML MDRD: 68 ML/MIN/1.73M2
GLUCOSE SERPL-MCNC: 96 MG/DL (ref 70–99)
GLUCOSE UR STRIP-MCNC: NEGATIVE MG/DL
HDLC SERPL-MCNC: 81 MG/DL
HGB UR QL STRIP: ABNORMAL
KETONES UR STRIP-MCNC: NEGATIVE MG/DL
LDLC SERPL CALC-MCNC: 113 MG/DL
LEUKOCYTE ESTERASE UR QL STRIP: ABNORMAL
NITRATE UR QL: NEGATIVE
NONHDLC SERPL-MCNC: 129 MG/DL
PH UR STRIP: 7 [PH] (ref 5–7)
POTASSIUM SERPL-SCNC: 5.1 MMOL/L (ref 3.4–5.3)
PROT SERPL-MCNC: 7.1 G/DL (ref 6.4–8.3)
RBC #/AREA URNS AUTO: ABNORMAL /HPF
SODIUM SERPL-SCNC: 141 MMOL/L (ref 136–145)
SP GR UR STRIP: 1.02 (ref 1–1.03)
SQUAMOUS #/AREA URNS AUTO: ABNORMAL /LPF
TRIGL SERPL-MCNC: 80 MG/DL
UROBILINOGEN UR STRIP-ACNC: 0.2 E.U./DL
WBC #/AREA URNS AUTO: ABNORMAL /HPF

## 2023-03-23 PROCEDURE — 81001 URINALYSIS AUTO W/SCOPE: CPT | Performed by: FAMILY MEDICINE

## 2023-03-23 PROCEDURE — 99214 OFFICE O/P EST MOD 30 MIN: CPT | Performed by: FAMILY MEDICINE

## 2023-03-23 PROCEDURE — 80061 LIPID PANEL: CPT | Performed by: FAMILY MEDICINE

## 2023-03-23 PROCEDURE — 36415 COLL VENOUS BLD VENIPUNCTURE: CPT | Performed by: FAMILY MEDICINE

## 2023-03-23 PROCEDURE — 80053 COMPREHEN METABOLIC PANEL: CPT | Performed by: FAMILY MEDICINE

## 2023-03-23 RX ORDER — ROSUVASTATIN CALCIUM 5 MG/1
5 TABLET, COATED ORAL DAILY
Qty: 90 TABLET | Refills: 3 | Status: SHIPPED | OUTPATIENT
Start: 2023-03-23 | End: 2023-04-09 | Stop reason: DRUGHIGH

## 2023-03-23 RX ORDER — METOPROLOL SUCCINATE 50 MG/1
TABLET, EXTENDED RELEASE ORAL
Qty: 135 TABLET | Refills: 1 | Status: SHIPPED | OUTPATIENT
Start: 2023-03-23 | End: 2023-09-22

## 2023-03-23 ASSESSMENT — PAIN SCALES - GENERAL: PAINLEVEL: NO PAIN (0)

## 2023-03-23 NOTE — PROGRESS NOTES
[address] [address]  Phone: [phone]  Primary Provider: Kristi House  Pre-op Performing Provider: KRISTI HOUSE      PREOPERATIVE EVALUATION:  Today's date: 3/23/2023    Love Pinon is a 68 year old female who presents for a preoperative evaluation.  No flowsheet data found.  Surgical Information:  Surgery/Procedure:Cataract Surgery  Surgery Location: [place]  Surgeon: Melvin Shankar  Surgery Date: 4/4/2023  Time of Surgery: to be determined   Where patient plans to recover: At home with family  Fax number for surgical facility: [phone]    Assessment & Plan     The proposed surgical procedure is considered LOW risk.    (Z01.818) Preop general physical exam  (primary encounter diagnosis)  Comment:   Plan:     (E78.5) Hyperlipidemia LDL goal <130  Comment:   Plan: COMPREHENSIVE METABOLIC PANEL, Lipid panel         reflex to direct LDL Fasting, rosuvastatin         (CRESTOR) 5 MG tablet            (I10) Benign essential hypertension  Comment:   Plan: COMPREHENSIVE METABOLIC PANEL, metoprolol         succinate ER (TOPROL XL) 50 MG 24 hr tablet            (R82.90) Cloudy urine  Comment: x  1 week, want to r/o UTI - will treat if positive   Plan: UA Macro with Reflex to Micro and Culture - lab        collect    (H26.9) Cataract of both eyes, unspecified cataract type  Comment:   Plan: ok for surgery       Risks and Recommendations:  The patient has the following additional risks and recommendations for perioperative complications:   - No identified additional risk factors other than previously addressed        RECOMMENDATION:  APPROVAL GIVEN to proceed with proposed procedure, without further diagnostic evaluation.      Subjective     HPI related to upcoming procedure: need cataract surgery, left eye first and will do rt later     Preop Questions 3/16/2023   1. Have you ever had a heart attack or stroke? No   2. Have you  ever had surgery on your heart or blood vessels, such as a stent placement, a coronary artery bypass, or surgery on an artery in your head, neck, heart, or legs? No   3. Do you have chest pain with activity? No   4. Do you have a history of  heart failure? No   5. Do you currently have a cold, bronchitis or symptoms of other infection? No   6. Do you have a cough, shortness of breath, or wheezing? No   7. Do you or anyone in your family have previous history of blood clots? No   8. Do you or does anyone in your family have a serious bleeding problem such as prolonged bleeding following surgeries or cuts? No   9. Have you ever had problems with anemia or been told to take iron pills? No   10. Have you had any abnormal blood loss such as black, tarry or bloody stools, or abnormal vaginal bleeding? No   11. Have you ever had a blood transfusion? No   12. Are you willing to have a blood transfusion if it is medically needed before, during, or after your surgery? Yes   13. Have you or any of your relatives ever had problems with anesthesia? No   14. Do you have sleep apnea, excessive snoring or daytime drowsiness? No   15. Do you have any artifical heart valves or other implanted medical devices like a pacemaker, defibrillator, or continuous glucose monitor? No   16. Do you have artificial joints? No   17. Are you allergic to latex? No       Health Care Directive:  Patient has a Health Care Directive on file      Preoperative Review of :        Status of Chronic Conditions:  See problem list for active medical problems.  Problems all longstanding and stable, except as noted/documented.  See ROS for pertinent symptoms related to these conditions.      Review of Systems  CONSTITUTIONAL: NEGATIVE for fever, chills, change in weight  INTEGUMENTARY/SKIN: NEGATIVE for worrisome rashes, moles or lesions  EYES: NEGATIVE for vision changes or irritation  ENT/MOUTH: NEGATIVE for ear, mouth and throat problems  RESP: NEGATIVE  for significant cough or SOB  CV: NEGATIVE for chest pain, palpitations or peripheral edema  GI: NEGATIVE for nausea, abdominal pain, heartburn, or change in bowel habits  : NEGATIVE for frequency, dysuria, or hematuria  MUSCULOSKELETAL: NEGATIVE for significant arthralgias or myalgia  NEURO: NEGATIVE for weakness, dizziness or paresthesias  ENDOCRINE: NEGATIVE for temperature intolerance, skin/hair changes  PSYCHIATRIC: NEGATIVE for changes in mood or affect    Patient Active Problem List    Diagnosis Date Noted     Decreased hearing, bilateral 12/10/2020     Priority: Medium     Other constipation 12/10/2020     Priority: Medium     Benign essential hypertension 05/20/2019     Priority: Medium     Thyroid nodule 04/15/2019     Priority: Medium     Cervical cancer screening 02/28/2019     Priority: Medium     Pt needs 2 Normal Pap/Neg HPV results or 3 Normal paps within the past 10 years, with the last one being in the last 5 years, before pap screening can be discontinued.    Pt's age: 63 will be 64 on 03/18/19.  Normal paps in   2009 NIL, 2011 NIL, 2012 NIL, 2016 NIL and 02/20/19: NIL pap, Neg HR HPV result.   No history of SURI 2 or greater found in epic.   No further cervical cancer screening recommended per provider.   Hm updated.                Nodular goiter 02/26/2019     Priority: Medium     Multiple complex/ solid nodule, pt asymptomatic        Breast cancer screening by mammogram 02/20/2019     Priority: Medium     Hyponatremia 02/22/2018     Priority: Medium     Osteoporosis 02/22/2017     Priority: Medium     also has fam hx of mom and mgm, matrenal aunt also had osteoporosis       Chronic idiopathic constipation 02/22/2017     Priority: Medium     Hypertension goal BP (blood pressure) < 140/90 04/30/2014     Priority: Medium     Migraine headache without aura      Priority: Medium     Seizure disorder (H)      Priority: Medium     one seizure-grandmal in 1989       BPPV (benign paroxysmal positional  vertigo) 10/26/2012     Priority: Medium     Advance care planning 09/01/2011     Priority: Medium     Advance Care Planning 2/17/2016: Receipt of ACP document:  Received: Health Care Directive which was witnessed or notarized on 09/29/2013.  Document not previously scanned.  Validation form completed and sent with document to be scanned.  Code Status needs to be updated to reflect choices in most recent ACP document.  Confirmed/documented designated decision maker(s).  Added by Linda Delgado  Advance Care Planning 9/1/11:  Discussed advance care planning with patient; information given to patient to review. Sherrie Schmitt CMA            HYPERLIPIDEMIA LDL GOAL <130 10/31/2010     Priority: Medium      Past Medical History:   Diagnosis Date     BPPV (benign paroxysmal positional vertigo) 10/26/2012     Herpes genitalia      Hyperlipidemia      Hypertension      Iron deficiency anemia secondary to blood loss (chronic)     menorrhagia     Migraine headache without aura      Seizure disorder (H)     one seizure-grandmal in 1989     Past Surgical History:   Procedure Laterality Date     APPENDECTOMY  2007     [facility]FF SURGICAL PATHOLOGY  1970    with cryotherapy     COLONOSCOPY  2005    polyp excision, repeat  5 years      COLONOSCOPY N/A 9/15/2016    3 adenoma polyps, repeat in 3 years      COLONOSCOPY N/A 4/29/2022    Procedure: COLONOSCOPY, WITH POLYPECTOMY AND BIOPSY;  Surgeon: Luis Bryant MD;  Location:  GI     [facility]       HYSTEROSCOPY       TUBAL LIGATION       Current Outpatient Medications   Medication Sig Dispense Refill     Cholecalciferol (VITAMIN D-3 PO)        Cranberry 500 MG CAPS Take  by mouth. Daily         fexofenadine (ALLEGRA) 180 MG tablet Take 180 mg by mouth daily       FISH OIL 1000 MG OR CAPS Twice daily       metoprolol succinate ER (TOPROL XL) 50 MG 24 hr tablet TAKE 1 & 1/2 TABLETS BY MOUTH EVERY  tablet 0     rosuvastatin (CRESTOR) 5 MG tablet Take 1 tablet (5 mg) by  "mouth daily 90 tablet 3     topiramate (TOPAMAX) 50 MG tablet Take 50 mg by mouth 2 times daily       FLUZONE HIGH-DOSE QUADRIVALENT 0.7 ML DEMETRIUS injection ADM 0.7ML IM UTD (Patient not taking: No sig reported)       SHINGRIX injection  (Patient not taking: No sig reported)         Allergies   Allergen Reactions     Aramine [Metaraminol Bitartrate]      Pcn [Penicillins]      Hives, swelling        Social History     Tobacco Use     Smoking status: Former     Years: 18.00     Types: Cigarettes     Quit date: 1989     Years since quittin.2     Smokeless tobacco: Never   Substance Use Topics     Alcohol use: Yes     Alcohol/week: 3.0 standard drinks     Types: 3 Standard drinks or equivalent per week     Comment: 2/week     Family History   Problem Relation Age of Onset     Cardiovascular Paternal Grandfather          of heart attack     Hypertension Father      Lipids Father      Cancer Father         MELANOMA     Genitourinary Problems Mother      Cancer Paternal Grandmother      Asthma Sister      Asthma Sister      History   Drug Use No         Objective     /87   Pulse 67   Temp 97.3  F (36.3  C) (Temporal)   Ht 1.651 m (5' 5\")   Wt 71.5 kg (157 lb 9.6 oz)   SpO2 98%   BMI 26.23 kg/m      Physical Exam    GENERAL APPEARANCE: healthy, alert and no distress     EYES: Eyes grossly normal to inspection     HENT: ear canals and TM's normal and oral mucous membranes moist     NECK: no adenopathy,     RESP: lungs clear to auscultation - no rales, rhonchi or wheezes     CV: regular rates and rhythm, normal S1 S2,      ABDOMEN:  soft, nontender, no HSM or masses and bowel sounds normal     MS: no  ankle edema     SKIN: no suspicious lesions or rashes     NEURO: Normal strength and tone, sensory exam grossly normal, mentation intact and speech normal     PSYCH: mentation appears normal. and affect normal/bright      Diagnostics:  Labs pending at this time.  Results will be reviewed when available. "   No EKG required for low risk surgery (cataract, skin procedure, breast biopsy, etc).    Revised Cardiac Risk Index (RCRI):  The patient has the following serious cardiovascular risks for perioperative complications:   - No serious cardiac risks = 0 points     RCRI Interpretation: 1 point: Class II (low risk - 0.9% complication rate)        Signed Electronically by: Bri House MD

## 2023-04-02 DIAGNOSIS — N39.0 URINARY TRACT INFECTION WITH HEMATURIA, SITE UNSPECIFIED: Primary | ICD-10-CM

## 2023-04-02 DIAGNOSIS — R31.9 URINARY TRACT INFECTION WITH HEMATURIA, SITE UNSPECIFIED: Primary | ICD-10-CM

## 2023-04-05 ENCOUNTER — TELEPHONE (OUTPATIENT)
Dept: FAMILY MEDICINE | Facility: CLINIC | Age: 68
End: 2023-04-05
Payer: COMMERCIAL

## 2023-04-05 DIAGNOSIS — E78.5 HYPERLIPIDEMIA LDL GOAL <130: ICD-10-CM

## 2023-04-05 NOTE — TELEPHONE ENCOUNTER
----- Message from Bir House MD sent at 4/2/2023  6:48 PM CDT -----  Please contact pt to inform as well   See results note     Let me know how she feels about it     Danis Aleman     your lab result are back . See details below.  Feel free to call me or follow up if you have questions or ongoing health concerns     Here is your report.      Normal liver function tests, kidney functions, glucose and  electrolytes(various salts in your body)       Lipid - remains minimally elevated but stable. May be increasing dose  of Crestor to 10 mg dose may help improve these numbers ( you are on 5 mg dose right now) .  Also Continue with Low fat low cholesterol diet  and exercise to help maintain a good cholesterol level and reduce the risk of heart disease .  Recommend  a follow up lipid  check in one year but sooner if problem taking med's on higher dose.     Urine shows persistent microscopic blood but not impressive for infection. I suggest do a follow up check with urologist to further evaluate         Thanks for your attention ,                               Dr. Bri House

## 2023-04-05 NOTE — TELEPHONE ENCOUNTER
Patient Contact    Attempt # 1    Was call answered?  No.  Left message on voicemail with information to call me back.    On call back, please relay PCP message below.

## 2023-04-07 RX ORDER — ROSUVASTATIN CALCIUM 10 MG/1
10 TABLET, COATED ORAL DAILY
Qty: 90 TABLET | Refills: 3 | Status: CANCELLED | OUTPATIENT
Start: 2023-04-07

## 2023-04-07 NOTE — TELEPHONE ENCOUNTER
Pt called back - read result notes to patient, states she already read them also on [employer]     Requesting updated 10mg Crestor Rx sent to [facility]     Will use up the 5mg tabs by doing 2 at a time for now    Deanna FRAZIER, Triage RN  [facility]

## 2023-04-07 NOTE — TELEPHONE ENCOUNTER
Patient Contact    Attempt # 2    Was call answered?  No.  Left message on voicemail with information to call triage back at [phone], option 2.   Patient did read MyChart result. Left message informing patient that she could respond to the result through [employer] with her response to Dr. House's plan.     On call back:   Lanny Vásquez RN

## 2023-04-09 DIAGNOSIS — E78.5 HYPERLIPIDEMIA LDL GOAL <130: Primary | ICD-10-CM

## 2023-04-09 RX ORDER — ROSUVASTATIN CALCIUM 10 MG/1
10 TABLET, COATED ORAL DAILY
Qty: 90 TABLET | Refills: 1 | Status: SHIPPED | OUTPATIENT
Start: 2023-04-09 | End: 2024-01-23

## 2023-04-20 ENCOUNTER — PATIENT OUTREACH (OUTPATIENT)
Dept: CARE COORDINATION | Facility: CLINIC | Age: 68
End: 2023-04-20
Payer: COMMERCIAL

## 2023-05-23 ENCOUNTER — HOSPITAL ENCOUNTER (OUTPATIENT)
Dept: MAMMOGRAPHY | Facility: CLINIC | Age: 68
Discharge: HOME | End: 2023-05-23
Attending: FAMILY MEDICINE | Admitting: FAMILY MEDICINE
Payer: COMMERCIAL

## 2023-05-23 DIAGNOSIS — Z12.31 VISIT FOR SCREENING MAMMOGRAM: ICD-10-CM

## 2023-05-23 PROCEDURE — 77067 SCR MAMMO BI INCL CAD: CPT

## 2023-06-19 ASSESSMENT — ENCOUNTER SYMPTOMS
FEVER: 0
FREQUENCY: 0
CONSTIPATION: 0
EYE PAIN: 0
NERVOUS/ANXIOUS: 0
SORE THROAT: 0
PALPITATIONS: 0
HEADACHES: 0
DIARRHEA: 0
COUGH: 0
PARESTHESIAS: 0
HEMATURIA: 0
DYSURIA: 0
ARTHRALGIAS: 1
NAUSEA: 0
HEARTBURN: 1
MYALGIAS: 1
HEMATOCHEZIA: 0
BREAST MASS: 0
WEAKNESS: 0
ABDOMINAL PAIN: 0
DIZZINESS: 0
JOINT SWELLING: 0
CHILLS: 0
SHORTNESS OF BREATH: 0

## 2023-06-19 ASSESSMENT — ACTIVITIES OF DAILY LIVING (ADL): CURRENT_FUNCTION: NO ASSISTANCE NEEDED

## 2023-06-20 ENCOUNTER — VIRTUAL VISIT (OUTPATIENT)
Dept: FAMILY MEDICINE | Facility: CLINIC | Age: 68
End: 2023-06-20
Payer: COMMERCIAL

## 2023-06-20 DIAGNOSIS — G40.909 SEIZURE DISORDER (H): ICD-10-CM

## 2023-06-20 DIAGNOSIS — E78.5 HYPERLIPIDEMIA LDL GOAL <130: Primary | ICD-10-CM

## 2023-06-20 DIAGNOSIS — M79.10 MYALGIA: ICD-10-CM

## 2023-06-20 DIAGNOSIS — Z00.00 ENCOUNTER FOR WELLNESS EXAMINATION IN ADULT: ICD-10-CM

## 2023-06-20 DIAGNOSIS — Z00.00 ROUTINE HISTORY AND PHYSICAL EXAMINATION OF ADULT: ICD-10-CM

## 2023-06-20 PROCEDURE — G0439 PPPS, SUBSEQ VISIT: HCPCS | Mod: VID | Performed by: FAMILY MEDICINE

## 2023-06-20 PROCEDURE — 99213 OFFICE O/P EST LOW 20 MIN: CPT | Mod: 25 | Performed by: FAMILY MEDICINE

## 2023-06-20 ASSESSMENT — ENCOUNTER SYMPTOMS
DYSURIA: 0
HEADACHES: 0
SORE THROAT: 0
PARESTHESIAS: 0
DIZZINESS: 0
JOINT SWELLING: 0
HEMATURIA: 0
FREQUENCY: 0
WEAKNESS: 0
HEMATOCHEZIA: 0
EYE PAIN: 0
NAUSEA: 0
BREAST MASS: 0
COUGH: 0
DIARRHEA: 0
HEARTBURN: 1
SHORTNESS OF BREATH: 0
PALPITATIONS: 0
ABDOMINAL PAIN: 0
FEVER: 0
NERVOUS/ANXIOUS: 0
CHILLS: 0
CONSTIPATION: 0
MYALGIAS: 1
ARTHRALGIAS: 1

## 2023-06-20 ASSESSMENT — ACTIVITIES OF DAILY LIVING (ADL): CURRENT_FUNCTION: NO ASSISTANCE NEEDED

## 2023-06-20 NOTE — PROGRESS NOTES
"Love is a 68 year old who is being evaluated via a billable phone  visit.      How would you like to obtain your AVS? MyChart  If the video visit is dropped, the invitation should be resent by: Text to cell phone: [phone]  Will anyone else be joining your video visit? No        Assessment & Plan         (Z00.00) Routine history and physical examination of adult  Comment:   Plan:       (E78.5) Hyperlipidemia LDL goal <130  (primary encounter diagnosis)  Comment:   Plan: on crestor 5mg. 10 mg gave her myalgia so she stopped after 3 days. And feeling  fine now, but will check ck to make sure normal.  she will schedule labs     (M79.10) Myalgia  Comment: ON HIGHER DOSE OF CRESTOR  few days ago, so she stopped it and feeling a lot better, so wants to stay on low dose and tolerating that  well     Plan: CK total        Check labs. Follow up as needed. She prefers to stay on low dose for now and doing well. She will do follow up as needed         (G40.909) Seizure disorder (H)  Comment:   Plan: stable none for a long time ,seeing neurology . Stable seeing neurology yearly     Check labs. .Cares and  treatment discussed.  follow up if problem   Patient expressed understanding and agreement with treatment plan. All patient's questions were answered, will let me know if has more later.  Medications: Rx's: Reviewed the potential side effects/complications of medications prescribed.     BMI:   Estimated body mass index is 26.23 kg/m  as calculated from the following:    Height as of 3/23/23: 1.651 m (5' 5\").    Weight as of 3/23/23: 71.5 kg (157 lb 9.6 oz).           Bri House MD  [facility]    Vadim Aleman is a 68 year old, presenting for the following health issues:  No chief complaint on file.    Healthy Habits:     In general, how would you rate your overall health?  Good    Frequency of exercise:  1 day/week    Duration of exercise:  Less than 15 minutes    Do you " "usually eat at least 4 servings of fruit and vegetables a day, include whole grains    & fiber and avoid regularly eating high fat or \"junk\" foods?  No    Taking medications regularly:  Yes    Medication side effects:  Muscle aches    Ability to successfully perform activities of daily living:  No assistance needed    Home Safety:  No safety concerns identified    Hearing Impairment:  Difficulty following a conversation in a noisy restaurant or crowded room and difficulty understanding soft or whispered speech    In the past 6 months, have you been bothered by leaking of urine?  No    In general, how would you rate your overall mental or emotional health?  Good      PHQ-2 Total Score: 0    Additional concerns today:  No       Hyperlipidemia Follow-Up    Are you regularly taking any medication or supplement to lower your cholesterol?   Yes- Manuel , was on 5 mg, last lipid recently came back a little high so she was asked to increase dose to 10 mg ( was on 5 mg before- but she started having some muscles aches and then she stopped after taking for 3 days and sx resolved and she as no aces and pains now , so wants to stay o low dose   Are you having muscle aches or other side effects that you think could be caused by your cholesterol lowering medication?  No  As above               Review of Systems   Constitutional: Negative for chills and fever.   HENT: Negative for congestion, ear pain, hearing loss and sore throat.    Eyes: Negative for pain and visual disturbance.   Respiratory: Negative for cough and shortness of breath.    Cardiovascular: Negative for chest pain, palpitations and peripheral edema.   Gastrointestinal: Positive for heartburn. Negative for abdominal pain, constipation, diarrhea, hematochezia and nausea.   Breasts:  Negative for tenderness, breast mass and discharge.   Genitourinary: Negative for dysuria, frequency, genital sores, hematuria, pelvic pain, urgency, vaginal bleeding and vaginal " discharge.   Musculoskeletal: Positive for arthralgias and myalgias. Negative for joint swelling.   Skin: Negative for rash.   Neurological: Negative for dizziness, weakness, headaches and paresthesias.   Psychiatric/Behavioral: Negative for mood changes. The patient is not nervous/anxious.       CONSTITUTIONAL: NEGATIVE for fever, chills, change in weight  INTEGUMENTARY/SKIN: NEGATIVE for worrisome rashes, moles or lesions  EYES: NEGATIVE for vision changes or irritation  ENT/MOUTH: NEGATIVE for ear, mouth and throat problems  RESP: NEGATIVE for significant cough or SOB  BREAST: NEGATIVE for masses, tenderness or discharge  CV: NEGATIVE for chest pain, palpitations or peripheral edema  GI: NEGATIVE for nausea, abdominal pain, heartburn, or change in bowel habits  : NEGATIVE for frequency, dysuria, or hematuria  MUSCULOSKELETAL: NEGATIVE for significant arthralgias or myalgia  NEURO: NEGATIVE for weakness, dizziness or paresthesias  ENDOCRINE: NEGATIVE for temperature intolerance, skin/hair changes  HEME: NEGATIVE for bleeding problems  PSYCHIATRIC: NEGATIVE for changes in mood or affect      Objective           Vitals:  No vitals were obtained today due to virtual visit.    Physical Exam   GENERAL: Healthy, alert and no distress  EYES: Eyes grossly normal to inspection.  No discharge or erythema, or obvious scleral/conjunctival abnormalities.  RESP: No audible wheeze, cough, or visible cyanosis.  No visible retractions or increased work of breathing.    SKIN: Visible skin clear. No significant rash, abnormal pigmentation or lesions.  NEURO: Cranial nerves grossly intact.  Mentation and speech appropriate for age.  PSYCH: Mentation appears normal, affect normal/bright, judgement and insight intact, normal speech and appearance well-groomed.            Video-Visit Details    Type of service:  Video Visit       Originating Location (pt. Location): Home  Distant Location (provider location):  On-site  Platform used  for Video Visit: Anjelica/ doximity video did not work for her so she did only phone visit on doximity

## 2023-06-22 ENCOUNTER — LAB (OUTPATIENT)
Dept: LAB | Facility: CLINIC | Age: 68
End: 2023-06-22
Payer: COMMERCIAL

## 2023-06-22 DIAGNOSIS — M79.10 MYALGIA: ICD-10-CM

## 2023-06-22 LAB — CK SERPL-CCNC: 81 U/L (ref 26–192)

## 2023-06-22 PROCEDURE — 82550 ASSAY OF CK (CPK): CPT

## 2023-06-22 PROCEDURE — 36415 COLL VENOUS BLD VENIPUNCTURE: CPT

## 2023-09-22 DIAGNOSIS — I10 BENIGN ESSENTIAL HYPERTENSION: ICD-10-CM

## 2023-09-22 RX ORDER — METOPROLOL SUCCINATE 50 MG/1
TABLET, EXTENDED RELEASE ORAL
Qty: 135 TABLET | Refills: 0 | Status: SHIPPED | OUTPATIENT
Start: 2023-09-22 | End: 2024-01-16

## 2024-01-04 ENCOUNTER — PATIENT OUTREACH (OUTPATIENT)
Dept: GASTROENTEROLOGY | Facility: CLINIC | Age: 69
End: 2024-01-04
Payer: COMMERCIAL

## 2024-01-15 DIAGNOSIS — I10 BENIGN ESSENTIAL HYPERTENSION: ICD-10-CM

## 2024-01-16 RX ORDER — METOPROLOL SUCCINATE 50 MG/1
TABLET, EXTENDED RELEASE ORAL
Qty: 135 TABLET | Refills: 0 | Status: SHIPPED | OUTPATIENT
Start: 2024-01-16 | End: 2024-04-11

## 2024-01-23 ENCOUNTER — E-VISIT (OUTPATIENT)
Dept: FAMILY MEDICINE | Facility: CLINIC | Age: 69
End: 2024-01-23
Payer: COMMERCIAL

## 2024-01-23 DIAGNOSIS — J32.9 SINUSITIS, UNSPECIFIED CHRONICITY, UNSPECIFIED LOCATION: Primary | ICD-10-CM

## 2024-01-23 PROCEDURE — 99422 OL DIG E/M SVC 11-20 MIN: CPT | Performed by: FAMILY MEDICINE

## 2024-01-23 RX ORDER — AZITHROMYCIN 250 MG/1
TABLET, FILM COATED ORAL
Qty: 6 TABLET | Refills: 0 | Status: SHIPPED | OUTPATIENT
Start: 2024-01-23 | End: 2024-01-28

## 2024-01-23 NOTE — PATIENT INSTRUCTIONS
Thank you for choosing us for your care. I have placed an order for a prescription so that you can start treatment. View your full visit summary for details by clicking on the link below. Your pharmacist will able to address any questions you may have about the medication.     If you're not feeling better within 5-7 days, please schedule an appointment.  You can schedule an appointment right here in [facility], or call [phone]  If the visit is for the same symptoms as your eVisit, we'll refund the cost of your eVisit if seen within seven days.

## 2024-03-15 DIAGNOSIS — E78.5 HYPERLIPIDEMIA LDL GOAL <130: ICD-10-CM

## 2024-03-21 RX ORDER — ROSUVASTATIN CALCIUM 5 MG/1
5 TABLET, COATED ORAL DAILY
Qty: 90 TABLET | Refills: 0 | Status: SHIPPED | OUTPATIENT
Start: 2024-03-21 | End: 2024-04-16

## 2024-03-21 NOTE — TELEPHONE ENCOUNTER
Script refill faxed. Remind pt to do follow up for med check and labs,/ annual check up  since she is due.

## 2024-04-11 DIAGNOSIS — I10 BENIGN ESSENTIAL HYPERTENSION: ICD-10-CM

## 2024-04-11 RX ORDER — METOPROLOL SUCCINATE 50 MG/1
TABLET, EXTENDED RELEASE ORAL
Qty: 135 TABLET | Refills: 0 | Status: SHIPPED | OUTPATIENT
Start: 2024-04-11 | End: 2024-04-16

## 2024-04-16 ENCOUNTER — OFFICE VISIT (OUTPATIENT)
Dept: FAMILY MEDICINE | Facility: CLINIC | Age: 69
End: 2024-04-16
Payer: COMMERCIAL

## 2024-04-16 VITALS
OXYGEN SATURATION: 99 % | TEMPERATURE: 99.6 F | HEART RATE: 61 BPM | WEIGHT: 161 LBS | DIASTOLIC BLOOD PRESSURE: 88 MMHG | RESPIRATION RATE: 13 BRPM | SYSTOLIC BLOOD PRESSURE: 138 MMHG | BODY MASS INDEX: 26.79 KG/M2

## 2024-04-16 DIAGNOSIS — E78.5 HYPERLIPIDEMIA LDL GOAL <130: ICD-10-CM

## 2024-04-16 DIAGNOSIS — I10 BENIGN ESSENTIAL HYPERTENSION: ICD-10-CM

## 2024-04-16 DIAGNOSIS — M25.521 RIGHT ELBOW PAIN: Primary | ICD-10-CM

## 2024-04-16 DIAGNOSIS — G40.909 SEIZURE DISORDER (H): ICD-10-CM

## 2024-04-16 PROCEDURE — 99214 OFFICE O/P EST MOD 30 MIN: CPT | Performed by: FAMILY MEDICINE

## 2024-04-16 PROCEDURE — 36415 COLL VENOUS BLD VENIPUNCTURE: CPT | Performed by: FAMILY MEDICINE

## 2024-04-16 PROCEDURE — 82550 ASSAY OF CK (CPK): CPT | Performed by: FAMILY MEDICINE

## 2024-04-16 PROCEDURE — 80053 COMPREHEN METABOLIC PANEL: CPT | Performed by: FAMILY MEDICINE

## 2024-04-16 RX ORDER — RESPIRATORY SYNCYTIAL VIRUS VACCINE 120MCG/0.5
0.5 KIT INTRAMUSCULAR ONCE
Qty: 1 EACH | Refills: 0 | Status: CANCELLED | OUTPATIENT
Start: 2024-04-16 | End: 2024-04-16

## 2024-04-16 RX ORDER — METOPROLOL SUCCINATE 50 MG/1
TABLET, EXTENDED RELEASE ORAL
Qty: 135 TABLET | Refills: 1 | Status: SHIPPED | OUTPATIENT
Start: 2024-04-16

## 2024-04-16 RX ORDER — ROSUVASTATIN CALCIUM 5 MG/1
5 TABLET, COATED ORAL DAILY
Qty: 90 TABLET | Refills: 1 | Status: SHIPPED | OUTPATIENT
Start: 2024-04-16

## 2024-04-16 ASSESSMENT — PAIN SCALES - GENERAL: PAINLEVEL: NO PAIN (0)

## 2024-04-16 NOTE — PROGRESS NOTES
Assessment & Plan         (I10) Benign essential hypertension  Comment:   Plan: COMPREHENSIVE METABOLIC PANEL, metoprolol         succinate ER (TOPROL XL) 50 MG 24 hr tablet        BP in adequate control. Discussed cares, low fat low salt  diet etc. Check labs, call pt with results. Refill given.              encouraged home BP monitoring. Follow up recheck in 6 months, sooner if problem.       (E78.5) Hyperlipidemia LDL goal <130  Comment: non fasting - so will do lipid another day   Plan: Lipid panel reflex to direct LDL Non-fasting,         rosuvastatin (CRESTOR) 5 MG tablet, CK total        Check labs  . Adjust dose if needed       (M25.521) Right elbow pain  (primary encounter diagnosis)  Comment: ongoing for x 1 yr, tendonitis   Plan: Physical Therapy  Referral       discussed cares and symptomatic treatment including  adequate pain control, heat,  stretches etc. Comfortable taking OTC med's as needed and willing to try  physical therapy bc of ongoing sx       she will do follow up if no improvement or problem. Consider further evaluation if needed.               Check labs. refill sent.Cares and  treatment discussed follow. up if problem   Patient expressed understanding and agreement with treatment plan. All patient's questions were answered, will let me know if has more later.  Medications: Rx's: Reviewed the potential side effects/complications of medications prescribed.     Vadim Aleman is a 69 year old, presenting for the following health issues:  Recheck Medication        4/16/2024     1:52 PM   Additional Questions   Roomed by Miguel A GAITAN     History of Present Illness       Hyperlipidemia:  She presents for follow up of hyperlipidemia.   She is taking medication to lower cholesterol. She is having myalgia or other side effects to statin medications.    Hypertension: She presents for follow up of hypertension.  She does not check blood pressure  regularly outside of the clinic. Outside  blood pressures have been over 140/90. She does not follow a low salt diet.     She eats 2-3 servings of fruits and vegetables daily.She consumes 0 sweetened beverage(s) daily.She exercises with enough effort to increase her heart rate 10 to 19 minutes per day.  She exercises with enough effort to increase her heart rate 3 or less days per week.   She is taking medications regularly.     Rt elbow pain x 1 yr, on and off and mostly hurts to use her arm/ elbow . She was not sure  if it could be related to her Crestor ? Or not .    picking up cup of off/ etc can aggravate pain  no redness swelling.  No numbness and tingling down the arm.   no recall of any injury although she is always using her arms at work but left elbow is not bothering her.    Not constant pian but  sometimes her forearm feels sore and pain has been becoming more noticeable and progressing  with time so wanted to get checked           Review of Systems  Constitutional, HEENT, cardiovascular, pulmonary, GI, , musculoskeletal, neuro, skin, endocrine and psych systems are negative, except as otherwise noted.      Objective    BP (!) 146/90   Pulse 61   Temp 99.6  F (37.6  C) (Tympanic)   Resp 13   Wt 73 kg (161 lb)   SpO2 99%   BMI 26.79 kg/m    Body mass index is 26.79 kg/m .  Physical Exam   GENERAL: alert and no distress  EYES: Eyes grossly normal to inspection  RESP: lungs clear to auscultation - no rales, rhonchi or wheezes  CV: regular rate and rhythm, normal S1 S2, no S3 or S4, , no peripheral edema  ABDOMEN: soft, nontender, no hepatosplenomegaly, no masses and bowel sounds normal  MS: rt  elbow with normal range of motion and tenderness to palpation mostly along lateral epicondyle and olecranon area , ROM aggravates pain   SKIN: no suspicious lesions or rashes  NEURO: Normal strength and tone, mentation intact and speech normal  PSYCH: mentation appears normal, affect normal/bright      Signed Electronically by: Bri House,  MD

## 2024-04-16 NOTE — PATIENT INSTRUCTIONS
COVID PCR negative Check labs   Take medications as directed.  Treatment  and symptomatic cares discussed   Follow up if problem or concern

## 2024-04-17 LAB
ALBUMIN SERPL BCG-MCNC: 4.5 G/DL (ref 3.5–5.2)
ALP SERPL-CCNC: 106 U/L (ref 40–150)
ALT SERPL W P-5'-P-CCNC: 20 U/L (ref 0–50)
ANION GAP SERPL CALCULATED.3IONS-SCNC: 13 MMOL/L (ref 7–15)
AST SERPL W P-5'-P-CCNC: 26 U/L (ref 0–45)
BILIRUB SERPL-MCNC: 0.2 MG/DL
BUN SERPL-MCNC: 13.6 MG/DL (ref 8–23)
CALCIUM SERPL-MCNC: 9.9 MG/DL (ref 8.8–10.2)
CHLORIDE SERPL-SCNC: 106 MMOL/L (ref 98–107)
CK SERPL-CCNC: 202 U/L (ref 26–192)
CREAT SERPL-MCNC: 0.81 MG/DL (ref 0.51–0.95)
DEPRECATED HCO3 PLAS-SCNC: 21 MMOL/L (ref 22–29)
EGFRCR SERPLBLD CKD-EPI 2021: 78 ML/MIN/1.73M2
GLUCOSE SERPL-MCNC: 97 MG/DL (ref 70–99)
POTASSIUM SERPL-SCNC: 4.5 MMOL/L (ref 3.4–5.3)
PROT SERPL-MCNC: 7.2 G/DL (ref 6.4–8.3)
SODIUM SERPL-SCNC: 140 MMOL/L (ref 135–145)

## 2024-04-18 ENCOUNTER — TELEPHONE (OUTPATIENT)
Dept: FAMILY MEDICINE | Facility: CLINIC | Age: 69
End: 2024-04-18
Payer: COMMERCIAL

## 2024-04-18 PROBLEM — M25.521 RIGHT ELBOW PAIN: Status: ACTIVE | Noted: 2024-04-18

## 2024-04-18 NOTE — TELEPHONE ENCOUNTER
Will close encounter. Patient has read the my chart labs and provider comments.       Tiny Palmer RN  [facility]         Danis Aleman    your lab result are back . See details below.  Feel free to call me or follow up if you have questions or ongoing health concerns     Here is your report.     Normal liver function tests, kidney functions, glucose and  electrolytes(various salts in your body)       CK(muscle enzyme) is minimally elevated although I doubt it is related to statin -   ( elbow pain is also not likely statin related) but you can  hold Crestor for 6-8  weeks and we ca recheck to make sure it normalizes .  Although I would suggest get your lipid test now before you stop medication ,so we can get some idea how it has affected lipid results ( order is in place but you did not do the test during this visit as you were not fasting)     I would recommend follow up visit (after hold med's for 6-8 weeks)  so we can evaluate and will do repeat labs at that time .        Thanks for your attention ,                               Dr. Bri House   Written by Bri House MD on 4/18/2024  3:46 PM CDT  Seen by patient Love Pinon on 4/18/2024  3:50 PM

## 2024-04-18 NOTE — TELEPHONE ENCOUNTER
----- Message from Bri House MD sent at 4/18/2024  3:46 PM CDT -----  Please contact pt to inform as well   See results note

## 2024-05-03 ENCOUNTER — LAB (OUTPATIENT)
Dept: LAB | Facility: CLINIC | Age: 69
End: 2024-05-03
Payer: COMMERCIAL

## 2024-05-03 DIAGNOSIS — E78.5 HYPERLIPIDEMIA LDL GOAL <130: ICD-10-CM

## 2024-05-03 PROCEDURE — 36415 COLL VENOUS BLD VENIPUNCTURE: CPT

## 2024-05-03 PROCEDURE — 80061 LIPID PANEL: CPT

## 2024-05-04 LAB
CHOLEST SERPL-MCNC: 226 MG/DL
FASTING STATUS PATIENT QL REPORTED: YES
HDLC SERPL-MCNC: 85 MG/DL
LDLC SERPL CALC-MCNC: 113 MG/DL
NONHDLC SERPL-MCNC: 141 MG/DL
TRIGL SERPL-MCNC: 139 MG/DL

## 2024-05-07 ENCOUNTER — TRANSFERRED RECORDS (OUTPATIENT)
Dept: HEALTH INFORMATION MANAGEMENT | Facility: CLINIC | Age: 69
End: 2024-05-07
Payer: COMMERCIAL

## 2024-05-21 ENCOUNTER — OFFICE VISIT (OUTPATIENT)
Dept: FAMILY MEDICINE | Facility: CLINIC | Age: 69
End: 2024-05-21
Payer: COMMERCIAL

## 2024-05-21 ENCOUNTER — ANCILLARY PROCEDURE (OUTPATIENT)
Dept: GENERAL RADIOLOGY | Facility: CLINIC | Age: 69
End: 2024-05-21
Attending: PHYSICIAN ASSISTANT
Payer: COMMERCIAL

## 2024-05-21 ENCOUNTER — NURSE TRIAGE (OUTPATIENT)
Dept: FAMILY MEDICINE | Facility: CLINIC | Age: 69
End: 2024-05-21

## 2024-05-21 VITALS
RESPIRATION RATE: 12 BRPM | BODY MASS INDEX: 26.69 KG/M2 | HEART RATE: 64 BPM | WEIGHT: 160.2 LBS | DIASTOLIC BLOOD PRESSURE: 87 MMHG | TEMPERATURE: 97.7 F | OXYGEN SATURATION: 97 % | HEIGHT: 65 IN | SYSTOLIC BLOOD PRESSURE: 136 MMHG

## 2024-05-21 DIAGNOSIS — R05.1 ACUTE COUGH: Primary | ICD-10-CM

## 2024-05-21 LAB — SARS-COV-2 RNA RESP QL NAA+PROBE: NEGATIVE

## 2024-05-21 PROCEDURE — 87635 SARS-COV-2 COVID-19 AMP PRB: CPT | Performed by: PHYSICIAN ASSISTANT

## 2024-05-21 PROCEDURE — 71046 X-RAY EXAM CHEST 2 VIEWS: CPT | Mod: TC | Performed by: RADIOLOGY

## 2024-05-21 PROCEDURE — 99213 OFFICE O/P EST LOW 20 MIN: CPT | Performed by: PHYSICIAN ASSISTANT

## 2024-05-21 ASSESSMENT — PAIN SCALES - GENERAL: PAINLEVEL: MILD PAIN (2)

## 2024-05-21 NOTE — TELEPHONE ENCOUNTER
Patient called the clinic and stated that she has been having a cough since late last week. The cough has been productive. Patient was scheduled for an appointment today with an available provider. Patient agreed with this plan and had no further questions.    Reason for Disposition   Patient wants to be seen    Additional Information   Negative: Bluish (or gray) lips or face   Negative: SEVERE difficulty breathing (e.g., struggling for each breath, speaks in single words)   Negative: Rapid onset of cough and has hives   Negative: Coughing started suddenly after medicine, an allergic food or bee sting   Negative: Difficulty breathing after exposure to flames, smoke, or fumes   Negative: Sounds like a life-threatening emergency to the triager   Negative: Previous asthma attacks and this feels like asthma attack   Negative: Dry cough (non-productive; no sputum or minimal clear sputum) and within 14 days of COVID-19 Exposure   Negative: MODERATE difficulty breathing (e.g., speaks in phrases, SOB even at rest, pulse 100-120) and still present when not coughing   Negative: Chest pain present when not coughing   Negative: Passed out (i.e., fainted, collapsed and was not responding)   Negative: Patient sounds very sick or weak to the triager   Negative: MILD difficulty breathing (e.g., minimal/no SOB at rest, SOB with walking, pulse <100) and still present when not coughing   Negative: Coughed up > 1 tablespoon (15 ml) blood (Exception: Blood-tinged sputum.)   Negative: Fever > 103 F (39.4 C)   Negative: Fever > 101 F (38.3 C) and over 60 years of age   Negative: Fever > 100.0 F (37.8 C) and has diabetes mellitus or a weak immune system (e.g., HIV positive, cancer chemotherapy, organ transplant, splenectomy, chronic steroids)   Negative: Fever > 100.0 F (37.8 C) and bedridden (e.g., CVA, chronic illness, recovering from surgery)   Negative: Increasing ankle swelling   Negative: Wheezing is present   Negative: SEVERE  "coughing spells (e.g., whooping sound after coughing, vomiting after coughing)   Negative: Coughing up jono-colored (reddish-brown) or blood-tinged sputum   Negative: Fever present > 3 days (72 hours)   Negative: Fever returns after gone for over 24 hours and symptoms worse or not improved   Negative: Using nasal washes and pain medicine > 24 hours and sinus pain persists   Negative: Known COPD or other severe lung disease (i.e., bronchiectasis, cystic fibrosis, lung surgery) and worsening symptoms (i.e., increased sputum purulence or amount, increased breathing difficulty)   Negative: Continuous (nonstop) coughing interferes with work or school and no improvement using cough treatment per Care Advice    Answer Assessment - Initial Assessment Questions  1. ONSET: \"When did the cough begin?\"         End of last week    2. SEVERITY: \"How bad is the cough today?\"         Moderate    3. SPUTUM: \"Describe the color of your sputum\" (none, dry cough; clear, white, yellow, green)        Yes, green    4. HEMOPTYSIS: \"Are you coughing up any blood?\" If so ask: \"How much?\" (flecks, streaks, tablespoons, etc.)        No    5. DIFFICULTY BREATHING: \"Are you having difficulty breathing?\" If Yes, ask: \"How bad is it?\" (e.g., mild, moderate, severe)     - MILD: No SOB at rest, mild SOB with walking, speaks normally in sentences, can lie down, no retractions, pulse < 100.     - MODERATE: SOB at rest, SOB with minimal exertion and prefers to sit, cannot lie down flat, speaks in phrases, mild retractions, audible wheezing, pulse 100-120.     - SEVERE: Very SOB at rest, speaks in single words, struggling to breathe, sitting hunched forward, retractions, pulse > 120         No    6. FEVER: \"Do you have a fever?\" If Yes, ask: \"What is your temperature, how was it measured, and when did it start?\"        No    7. CARDIAC HISTORY: \"Do you have any history of heart disease?\" (e.g., heart attack, congestive heart failure)         HTN    8. " "LUNG HISTORY: \"Do you have any history of lung disease?\"  (e.g., pulmonary embolus, asthma, emphysema)        None    9. PE RISK FACTORS: \"Do you have a history of blood clots?\" (or: recent major surgery, recent prolonged travel, bedridden)        No     10. OTHER SYMPTOMS: \"Do you have any other symptoms?\" (e.g., runny nose, wheezing, chest pain)          Runny nose (allergy season)    11. PREGNANCY: \"Is there any chance you are pregnant?\" \"When was your last menstrual period?\"          No    12. TRAVEL: \"Have you traveled out of the country in the last month?\" (e.g., travel history, exposures)          No    Protocols used: Clarke CARRION RN  [facility] Triage Team    "

## 2024-05-21 NOTE — PROGRESS NOTES
"Assessment and Plan:     (R05.1) Acute cough  (primary encounter diagnosis)  Comment: onset 5 days ago, productive at times, denies shortness of breath, fever/chills, chest pain, appears well today, will do PCR test for covid since she is still in treatment window for antivirals   Plan: Symptomatic COVID-19 Virus (Coronavirus) by PCR        Nose, XR Chest 2 Views        Continue symptomatic cares, fluids and rest  Discussed reasons to be seen promptly     ANAYELI Ramirez Same Day Provider         Vadim Aleman is a 69 year old, presenting for the following health issues:  Cough (Took a home COVID test this morning - Negative )    History of Present Illness       Reason for visit:  Deep cough with chest congestion  Symptom onset:  3-7 days ago  Symptoms include:  Cough  Symptom intensity:  Moderate  Symptom progression:  Worsening  Had these symptoms before:  No    She eats 2-3 servings of fruits and vegetables daily.She consumes 0 sweetened beverage(s) daily.She exercises with enough effort to increase her heart rate 10 to 19 minutes per day.  She exercises with enough effort to increase her heart rate 3 or less days per week.   She is taking medications regularly.     Ms. Pinon is here for cough  She describes a \"deep cough with a rumble\" in her chest  Onset was 5 days ago  Cough is productive at times  She denies hemoptysis, sore throat, congestion   She denies shortness of breath, fever/chills, chest pain  She denies history of COPD or lung disease  She smoked for about 18 years, quit in 1989  She has never had pneumonia   Home covid test negative this am        Objective    /87 (BP Location: Right arm, Patient Position: Sitting, Cuff Size: Adult Regular)   Pulse 64   Temp 97.7  F (36.5  C) (Temporal)   Resp 12   Ht 1.651 m (5' 5\")   Wt 72.7 kg (160 lb 3.2 oz)   SpO2 97%   BMI 26.66 kg/m    Body mass index is 26.66 kg/m .    Physical Exam     EXAM:  GENERAL APPEARANCE: " healthy, alert and no distress  EYES: Eyes grossly normal to inspection,   HENT: ear canals and TMs normal, mouth without ulcers or lesions, oropharynx is clear without exudate or erythema, no tonsillar swelling  NECK: no adenopathy, no asymmetry, masses  RESP: crackles left base, o/w clear   CV: regular rates and rhythm, normal S1 S2, no S3 or S4 and no murmur, click or rub  EXT: no edema bilat lower ext         Signed Electronically by: Anai Sen PA-C

## 2024-05-22 NOTE — RESULT ENCOUNTER NOTE
Danis Aleman,     Your chest x-ray was negative for signs of pneumonia.     Please let us know if you have any questions or concerns.    Regards,  Anai Sen PA-C

## 2024-05-22 NOTE — RESULT ENCOUNTER NOTE
Danis Aleman,     Your covid test was negative.     Please let us know if you have any questions or concerns.    Regards,  Anai eSn PA-C

## 2024-05-24 ENCOUNTER — HOSPITAL ENCOUNTER (OUTPATIENT)
Dept: MAMMOGRAPHY | Facility: CLINIC | Age: 69
Discharge: HOME | End: 2024-05-24
Attending: FAMILY MEDICINE | Admitting: FAMILY MEDICINE
Payer: COMMERCIAL

## 2024-05-24 DIAGNOSIS — Z12.31 VISIT FOR SCREENING MAMMOGRAM: ICD-10-CM

## 2024-05-24 PROCEDURE — 77063 BREAST TOMOSYNTHESIS BI: CPT

## 2024-07-12 SDOH — HEALTH STABILITY: PHYSICAL HEALTH: ON AVERAGE, HOW MANY DAYS PER WEEK DO YOU ENGAGE IN MODERATE TO STRENUOUS EXERCISE (LIKE A BRISK WALK)?: 2 DAYS

## 2024-07-12 SDOH — HEALTH STABILITY: PHYSICAL HEALTH: ON AVERAGE, HOW MANY MINUTES DO YOU ENGAGE IN EXERCISE AT THIS LEVEL?: 10 MIN

## 2024-07-12 ASSESSMENT — SOCIAL DETERMINANTS OF HEALTH (SDOH): HOW OFTEN DO YOU GET TOGETHER WITH FRIENDS OR RELATIVES?: THREE TIMES A WEEK

## 2024-07-16 ENCOUNTER — OFFICE VISIT (OUTPATIENT)
Dept: FAMILY MEDICINE | Facility: CLINIC | Age: 69
End: 2024-07-16
Payer: COMMERCIAL

## 2024-07-16 VITALS
BODY MASS INDEX: 26.16 KG/M2 | RESPIRATION RATE: 12 BRPM | DIASTOLIC BLOOD PRESSURE: 82 MMHG | HEART RATE: 66 BPM | SYSTOLIC BLOOD PRESSURE: 132 MMHG | WEIGHT: 157 LBS | HEIGHT: 65 IN | OXYGEN SATURATION: 97 % | TEMPERATURE: 97.8 F

## 2024-07-16 DIAGNOSIS — E04.1 THYROID NODULE: ICD-10-CM

## 2024-07-16 DIAGNOSIS — Z78.0 MENOPAUSE: ICD-10-CM

## 2024-07-16 DIAGNOSIS — Z00.00 ROUTINE HISTORY AND PHYSICAL EXAMINATION OF ADULT: Primary | ICD-10-CM

## 2024-07-16 DIAGNOSIS — E78.5 HYPERLIPIDEMIA LDL GOAL <130: ICD-10-CM

## 2024-07-16 DIAGNOSIS — R74.8 ELEVATED CK: ICD-10-CM

## 2024-07-16 PROCEDURE — 36415 COLL VENOUS BLD VENIPUNCTURE: CPT | Performed by: FAMILY MEDICINE

## 2024-07-16 PROCEDURE — G0439 PPPS, SUBSEQ VISIT: HCPCS | Performed by: FAMILY MEDICINE

## 2024-07-16 PROCEDURE — 84443 ASSAY THYROID STIM HORMONE: CPT | Performed by: FAMILY MEDICINE

## 2024-07-16 PROCEDURE — 99214 OFFICE O/P EST MOD 30 MIN: CPT | Mod: 25 | Performed by: FAMILY MEDICINE

## 2024-07-16 PROCEDURE — 82550 ASSAY OF CK (CPK): CPT | Performed by: FAMILY MEDICINE

## 2024-07-16 RX ORDER — TOPIRAMATE 25 MG/1
TABLET, FILM COATED ORAL
COMMUNITY

## 2024-07-16 ASSESSMENT — PAIN SCALES - GENERAL: PAINLEVEL: NO PAIN (0)

## 2024-07-16 NOTE — PROGRESS NOTES
"Preventive Care Visit  [facility] IVANA ANGELOLUIS ENRIQUE  Bri House MD, Family Medicine  Jul 16, 2024      Assessment & Plan     (Z00.00) Routine history and physical examination of adult  (primary encounter diagnosis)  Comment:   Plan:     (R74.8) Elevated CK  Comment:   Plan:  CK total              (E78.5) HYPERLIPIDEMIA LDL GOAL <130  Comment:   Plan: Lipid panel reflex to direct LDL Fasting, Adult        Cardiology Eval  Referral        Patient with known history of significant hyperlipidemia hyperlipidemia, unable to tolerate statin most recently she is try low-dose of Crestor , that also cause myalgia .  Apparently her CK was also minimally elevated and rechecked last time .so she was asked to stop taking the statin .  Will do a repeat labs today . although clinically she is doing well   Denies any cardiovascular concerning symptoms, but she has history of MI in her dad and also maternal grandfather noted age 16 above.    So I am concerned with her significant hyperlipidemia now she is also postmenopausal.    I think it would be best to have her further evaluate for coronary artery disease  risk ,  and then potentially consider some other alternate, treatment for hyperlipidemia as well . So I have given her referral to the cardiologist to further evaluate and treat. .     (Z78.0) Menopause  Comment:   Plan: DX Bone Density            (E04.1) Thyroid nodule  Comment: Status post biopsy, benign nodules  Plan: TSH with free T4 reflex            Check labs. Cares and  treatment discussed follow. up if problem   Patient expressed understanding and agreement with treatment plan. All patient's questions were answered, will let me know if has more later.  Medications: Rx's: Reviewed the potential side effects/complications of medications prescribed.           BMI  Estimated body mass index is 26.48 kg/m  as calculated from the following:    Height as of this encounter: 1.64 m (5' 4.57\").    " Weight as of this encounter: 71.2 kg (157 lb).       Counseling  Appropriate preventive services were discussed with this patient, including applicable screening as appropriate for fall prevention, nutrition, physical activity, Tobacco-use cessation, weight loss and cognition.  Checklist reviewing preventive services available has been given to the patient.  Reviewed patient's diet, addressing concerns and/or questions.   She is at risk for lack of exercise and has been provided with information to increase physical activity for the benefit of her well-being.   The patient was instructed to see the dentist every 6 months.   The patient was provided with written information regarding signs of hearing loss.       Regular exercise  See Patient Instructions    Subjective   Love is a 69 year old, presenting for the following:  Wellness Visit        2024     4:09 PM   Additional Questions   Roomed by Tamanna [place]         Health Care Directive  Patient has a Health Care Directive on file  Advance care planning document is on file and is current.    HPI      Hyperlipidemia Follow-Up    Are you regularly taking any medication or supplement to lower your cholesterol?   No  but tried other statins , and then she tried most crestor recently and it also caused myalgias . also her ck was also mildly elevated so she was asked to stop and has not taken med for few months and her sx have resolved. So she prefers not t take statin, but concerned about her hig lipid and risk of CAD   She stated her maternal grandfather had possible bypass in his late 60s.  Also that possibly  of a MI in his mid to late 60s.  Although she thinks her dad also had rheumatic heart disease  Are you having muscle aches or other side effects that you think could be caused by your cholesterol lowering medication?  YES , as above.         2024   General Health   How would you rate your overall physical health? Good   Feel stress (tense,  anxious, or unable to sleep) To some extent      (!) STRESS CONCERN      7/12/2024   Nutrition   Diet: Low salt            7/12/2024   Exercise   Days per week of moderate/strenous exercise 2 days   Average minutes spent exercising at this level 10 min      (!) EXERCISE CONCERN      7/12/2024   Social Factors   Frequency of gathering with friends or relatives Three times a week   Worry food won't last until get money to buy more No   Food not last or not have enough money for food? No   Do you have housing? (Housing is defined as stable permanent housing and does not include staying ouside in a car, in a tent, in an abandoned building, in an overnight shelter, or couch-surfing.) Yes   Are you worried about losing your housing? No   Lack of transportation? No   Unable to get utilities (heat,electricity)? No            7/12/2024   Fall Risk   Fallen 2 or more times in the past year? No   Trouble with walking or balance? No             7/12/2024   Activities of Daily Living- Home Safety   Needs help with the following daily activites None of the above   Safety concerns in the home None of the above            7/12/2024   Dental   Dentist two times every year? (!) NO            7/12/2024   Hearing Screening   Hearing concerns? (!) I NEED TO ASK PEOPLE TO SPEAK UP OR REPEAT THEMSELVES.    (!) IT'S HARD TO FOLLOW A CONVERSATION IN A NOISY RESTAURANT OR CROWDED ROOM.    (!) TROUBLE UNDERSTANDING SOFT OR WHISPERED SPEECH.       Multiple values from one day are sorted in reverse-chronological order         7/12/2024   Driving Risk Screening   Patient/family members have concerns about driving No            7/12/2024   General Alertness/Fatigue Screening   Have you been more tired than usual lately? No            7/12/2024   Urinary Incontinence Screening   Bothered by leaking urine in past 6 months No            7/12/2024   TB Screening   Were you born outside of the [place]? No            Today's PHQ-2 Score:       7/16/2024     10:52 AM   PHQ-2 (  [employer])   Q1: Little interest or pleasure in doing things 0   Q2: Feeling down, depressed or hopeless 0   PHQ-2 Score 0   Q1: Little interest or pleasure in doing things Not at all   Q2: Feeling down, depressed or hopeless Not at all   PHQ-2 Score 0           2024   Substance Use   Alcohol more than 3/day or more than 7/wk No   Do you have a current opioid prescription? No   How severe/bad is pain from 1 to 10? /10   Do you use any other substances recreationally? (!) ALCOHOL        Social History     Tobacco Use    Smoking status: Former     Current packs/day: 0.00     Types: Cigarettes     Start date: 1972     Quit date: 1989     Years since quittin.5    Smokeless tobacco: Never   Substance Use Topics    Alcohol use: Yes     Alcohol/week: 3.0 standard drinks of alcohol     Types: 3 Standard drinks or equivalent per week     Comment: 2/week    Drug use: No           2024   LAST FHS-7 RESULTS   1st degree relative breast or ovarian cancer Yes   Any relative bilateral breast cancer No   Any male have breast cancer No   Any ONE woman have BOTH breast AND ovarian cancer Yes   Any woman with breast cancer before 50yrs No   2 or more relatives with breast AND/OR ovarian cancer No   2 or more relatives with breast AND/OR bowel cancer No                 History of abnormal Pap smear: No - age 65 or older with adequate negative prior screening test results (3 consecutive negative cytology results, 2 consecutive negative cotesting results, or 2 consecutive negative HrHPV test results within 10 years, with the most recent test occurring within the recommended screening interval for the test used)        Latest Ref Rng & Units 2019     9:40 AM 2019     9:15 AM 2016     2:00 PM   PAP / HPV   PAP (Historical)   NIL  NIL    HPV 16 DNA NEG^Negative Negative      HPV 18 DNA NEG^Negative Negative      Other HR HPV NEG^Negative Negative        ASCVD Risk   The  10-year ASCVD risk score (Hunter SALAMANCA, et al., 2019) is: 16.6%    Values used to calculate the score:      Age: 69 years      Sex: Female      Is Non- : No      Diabetic: No      Tobacco smoker: No      Systolic Blood Pressure: 152 mmHg      Is BP treated: Yes      HDL Cholesterol: 85 mg/dL      Total Cholesterol: 226 mg/dL    Fracture Risk Assessment Tool  Link to Frax Calculator  Use the information below to complete the Frax calculator  : 1955  Sex: female  Weight (kg): 71.2 kg (actual weight)  Height (cm): 164 cm  Previous Fragility Fracture:  No  History of parent with fractured hip:  No  Current Smoking:  No  Patient has been on glucocorticoids for more than 3 months (5mg/day or more): No  Rheumatoid Arthritis on Problem List:  No  Secondary Osteoporosis on Problem List:  No  Consumes 3 or more units of alcohol per day: No  Femoral Neck BMD (g/cm2)            Reviewed and updated as needed this visit by Provider                    Patient Active Problem List   Diagnosis    HYPERLIPIDEMIA LDL GOAL <130    Advance care planning    BPPV (benign paroxysmal positional vertigo)    Migraine headache without aura    Hypertension goal BP (blood pressure) < 140/90    Seizure disorder (H)    Osteoporosis    Chronic idiopathic constipation    Hyponatremia    Breast cancer screening by mammogram    Nodular goiter    Cervical cancer screening    Thyroid nodule    Benign essential hypertension    Decreased hearing, bilateral    Other constipation    Right elbow pain     Past Surgical History:   Procedure Laterality Date    APPENDECTOMY      BIOPSY  ??    [facility] - marker placed left breast    [facility]      with cryotherapy    COLONOSCOPY      polyp excision, repeat  5 years     COLONOSCOPY N/A 09/15/2016    3 adenoma polyps, repeat in 3 years     COLONOSCOPY N/A 2022    Procedure: COLONOSCOPY, WITH POLYPECTOMY AND BIOPSY;  Surgeon: Manny  Luis Pryor MD;  Location:  [facility]      HYSTEROSCOPY      TUBAL LIGATION         Social History     Tobacco Use    Smoking status: Former     Current packs/day: 0.00     Types: Cigarettes     Start date: 1972     Quit date: 1989     Years since quittin.5    Smokeless tobacco: Never   Substance Use Topics    Alcohol use: Yes     Alcohol/week: 3.0 standard drinks of alcohol     Types: 3 Standard drinks or equivalent per week     Comment: 2/week     Family History   Problem Relation Age of Onset    Genitourinary Problems Mother     Hypertension Father     Lipids Father     Cancer Father         MELANOMA    Asthma Sister     Breast Cancer Sister         Shanika - mastectomy 2021    Asthma Sister     Osteoporosis Maternal Grandmother     Cancer Paternal Grandmother     Other Cancer Paternal Grandmother     Coronary Artery Disease Paternal Grandfather     Cardiovascular Paternal Grandfather          of heart attack, joe ge [id number]    Substance Abuse Paternal Grandfather              Substance Abuse Daughter              Cerebrovascular Disease No family hx of          Current providers sharing in care for this patient include:  Patient Care Team:  Bri House MD as PCP - General (Family Practice)  Bri House MD as Assigned PCP    The following health maintenance items are reviewed in Epic and correct as of today:  Health Maintenance   Topic Date Due    COVID-19 Vaccine ( season) 2024    MEDICARE ANNUAL WELLNESS VISIT  2024    RSV VACCINE (Pregnancy & 60+) (1 - 1-dose 60+ series) 2025 (Originally 3/18/2014)    INFLUENZA VACCINE (1) 2024    CMP  2025    ANNUAL REVIEW OF HM ORDERS  2025    LIPID  2025    FALL RISK ASSESSMENT  2025    MAMMO SCREENING  2026    ADVANCE CARE PLANNING  2027    GLUCOSE  2027    COLORECTAL CANCER SCREENING  2027    DTAP/TDAP/TD IMMUNIZATION (4 -  "Td or Tdap) 11/20/2028    DEXA  04/01/2037    HEPATITIS C SCREENING  Completed    MIGRAINE ACTION PLAN  Completed    PHQ-2 (once per calendar year)  Completed    Pneumococcal Vaccine: 65+ Years  Completed    ZOSTER IMMUNIZATION  Completed    IPV IMMUNIZATION  Aged Out    HPV IMMUNIZATION  Aged Out    MENINGITIS IMMUNIZATION  Aged Out    RSV MONOCLONAL ANTIBODY  Aged Out         Review of Systems  CONSTITUTIONAL: NEGATIVE for fever, chills, change in weight  INTEGUMENTARY/SKIN: NEGATIVE for worrisome rashes, moles or lesions  EYES: NEGATIVE for vision changes or irritation  ENT/MOUTH: NEGATIVE for ear, mouth and throat problems  RESP: NEGATIVE for significant cough or SOB  BREAST: NEGATIVE for masses, tenderness or discharge  CV: NEGATIVE for chest pain, palpitations or peripheral edema  GI: NEGATIVE for nausea, abdominal pain, heartburn, or change in bowel habits  : NEGATIVE for frequency, dysuria, or hematuria  MUSCULOSKELETAL: NEGATIVE for significant arthralgias or myalgia  NEURO: NEGATIVE for weakness, dizziness or paresthesias  ENDOCRINE: NEGATIVE for temperature intolerance, skin/hair changes  HEME: NEGATIVE for bleeding problems  PSYCHIATRIC: NEGATIVE for changes in mood or affect     Objective    Exam  BP (!) 152/105   Pulse 66   Temp 97.8  F (36.6  C) (Temporal)   Resp 12   Ht 1.64 m (5' 4.57\")   Wt 71.2 kg (157 lb)   SpO2 97%   BMI 26.48 kg/m     Estimated body mass index is 26.48 kg/m  as calculated from the following:    Height as of this encounter: 1.64 m (5' 4.57\").    Weight as of this encounter: 71.2 kg (157 lb).    Physical Exam  GENERAL: alert and no distress  EYES: Eyes grossly normal to inspection, PERRL and conjunctivae and sclerae normal  HENT: ear canals and TM's normal, nose and mouth without ulcers or lesions  NECK: no adenopathy, no asymmetry, masses, or scars  RESP: lungs clear to auscultation - no rales, rhonchi or wheezes  CV: regular rate and rhythm, normal S1 S2, no S3 or S4, " no murmur, click or rub, no peripheral edema  ABDOMEN: soft, nontender, no hepatosplenomegaly, no masses and bowel sounds normal  MS: no gross musculoskeletal defects noted, no edema  SKIN: no suspicious lesions or rashes  NEURO: Normal strength and tone, mentation intact and speech normal  PSYCH: mentation appears normal, affect normal/bright         7/16/2024   Mini Cog   Clock Draw Score 2 Normal   3 Item Recall 3 objects recalled   Mini Cog Total Score 5                 Signed Electronically by: Bri House MD

## 2024-07-17 LAB
CK SERPL-CCNC: 70 U/L (ref 26–192)
TSH SERPL DL<=0.005 MIU/L-ACNC: 1.53 UIU/ML (ref 0.3–4.2)

## 2024-07-30 ENCOUNTER — MYC REFILL (OUTPATIENT)
Dept: FAMILY MEDICINE | Facility: CLINIC | Age: 69
End: 2024-07-30
Payer: COMMERCIAL

## 2024-07-30 DIAGNOSIS — I10 BENIGN ESSENTIAL HYPERTENSION: ICD-10-CM

## 2024-07-30 RX ORDER — METOPROLOL SUCCINATE 50 MG/1
TABLET, EXTENDED RELEASE ORAL
Qty: 135 TABLET | Refills: 1 | OUTPATIENT
Start: 2024-07-30

## 2024-08-30 ENCOUNTER — HOSPITAL ENCOUNTER (OUTPATIENT)
Dept: CARDIOLOGY | Facility: CLINIC | Age: 69
Discharge: HOME | End: 2024-08-30
Attending: INTERNAL MEDICINE | Admitting: INTERNAL MEDICINE
Payer: COMMERCIAL

## 2024-08-30 ENCOUNTER — OFFICE VISIT (OUTPATIENT)
Dept: CARDIOLOGY | Facility: CLINIC | Age: 69
End: 2024-08-30
Payer: COMMERCIAL

## 2024-08-30 VITALS
SYSTOLIC BLOOD PRESSURE: 145 MMHG | HEIGHT: 65 IN | WEIGHT: 158.2 LBS | BODY MASS INDEX: 26.36 KG/M2 | DIASTOLIC BLOOD PRESSURE: 91 MMHG | HEART RATE: 59 BPM

## 2024-08-30 DIAGNOSIS — I51.7 CARDIOMEGALY: Primary | ICD-10-CM

## 2024-08-30 DIAGNOSIS — E78.5 HYPERLIPIDEMIA LDL GOAL <130: ICD-10-CM

## 2024-08-30 DIAGNOSIS — I51.7 CARDIOMEGALY: ICD-10-CM

## 2024-08-30 LAB — LVEF ECHO: NORMAL

## 2024-08-30 PROCEDURE — 93306 TTE W/DOPPLER COMPLETE: CPT

## 2024-08-30 PROCEDURE — 93000 ELECTROCARDIOGRAM COMPLETE: CPT | Performed by: INTERNAL MEDICINE

## 2024-08-30 PROCEDURE — 93306 TTE W/DOPPLER COMPLETE: CPT | Mod: 26 | Performed by: INTERNAL MEDICINE

## 2024-08-30 PROCEDURE — 99204 OFFICE O/P NEW MOD 45 MIN: CPT | Mod: 25 | Performed by: INTERNAL MEDICINE

## 2024-08-30 RX ORDER — LOSARTAN POTASSIUM 50 MG/1
50 TABLET ORAL DAILY
Qty: 30 TABLET | Refills: 5 | Status: SHIPPED | OUTPATIENT
Start: 2024-08-30

## 2024-08-30 NOTE — LETTER
8/30/2024    Bri House MD  [address]  [place] [address]    RE: Love Pinon       Dear Colleague,     I had the pleasure of seeing Love QUE Pinon in the [facility].  HPI and Plan:   69-year-old lady referred for cholesterol management by her PMD    In May of this year, total cholesterol was 226, HDL 85 .  On Crestor at that time, 10 mg once daily.  She complains of elbow and upper limb joint pains and with cessation of Crestor the symptoms resolved    I do see a total cholesterol of 324 in November 2020.  She was on no medications at that time.  Her LDL was 215.  HDL 76    I reassured her that her family history is negative.  Her mother was first diagnosed with coronary artery disease in her 70s and received a stent.  Her father has hypertension and is still alive at the age of 92.  There are no other family members with hypercholesterolemia    She does not smoke drink or abuse drugs.  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    She has no symptoms of exertional angina or heart failure.  She describes an episode of right upper chest discomfort which occurs perhaps once a year at rest.  This is highly unlikely to be angina    She has hypertension.  She recalls being on the medication previously which resulted in a cough.  I think there is a very good likelihood that this is not ACE inhibitor.  Currently she is on Toprol-XL 75 mg once daily.  Her blood pressure today is elevated.  At home she tells me readings are 138/88 which tends to be on the high side.  I do think she will need another medication and I have added moderate dose Cozaar.  I advised her that moderate doses of 2 medications will be preferable to high dose of 1 medication in terms of side effects.  She is agreeable.  I advised her that Cozaar has an excellent side effect profile but we should check her electrolytes in a few weeks time.    She is also concerned that a  chest x-ray showed possible cardiomegaly.  I reassured her that chest x-ray finding of cardiomegaly is not likely to be very specific and I will request an echocardiogram    Her cardiac examination is normal.    EKG today shows sinus rhythm with incomplete right bundle branch block which could be a normal variant    Finally her cholesterol.  With an LDL of 215 there is no doubt by current guidelines that she should be on a cholesterol-lowering medication.  As she does have statin myopathy with Crestor I would like to try her on 3 times a week dosing at a lower dose of 5 mg.  If this is tolerated and we will move to 10 mg.  Otherwise the next step would probably be Setia but I would very much doubt that the struggle or would bring down her cholesterol significantly if these measures do not work then we will try and put her on a PCSK9 inhibitor.    Once I see your test results I will arrange follow-up          Orders Placed This Encounter   Procedures     EKG 12-lead complete w/read - Clinics (performed today)       No orders of the defined types were placed in this encounter.      Encounter Diagnosis   Name Primary?     HYPERLIPIDEMIA LDL GOAL <130        CURRENT MEDICATIONS:  Current Outpatient Medications   Medication Sig Dispense Refill     Bacillus Coagulans-Inulin (ALIGN PREBIOTIC-PROBIOTIC PO)        Cholecalciferol (VITAMIN D-3 PO)        Cranberry 500 MG CAPS Take  by mouth. Daily         fexofenadine (ALLEGRA) 180 MG tablet Take 180 mg by mouth daily       FISH OIL 1000 MG OR CAPS Twice daily       MAGNESIUM MALATE PO        metoprolol succinate ER (TOPROL XL) 50 MG 24 hr tablet TAKE ONE AND ONE-HALF TABLETS BY MOUTH DAILY 135 tablet 1     topiramate (TOPAMAX) 25 MG tablet        rosuvastatin (CRESTOR) 5 MG tablet Take 1 tablet (5 mg) by mouth daily (Patient not taking: Reported on 5/21/2024) 90 tablet 1       ALLERGIES     Allergies   Allergen Reactions     Aramine [Metaraminol Bitartrate]      Pcn  [Penicillins]      Hives, swelling       PAST MEDICAL HISTORY:  Past Medical History:   Diagnosis Date     BPPV (benign paroxysmal positional vertigo) 10/26/2012     Herpes genitalia      Hyperlipidemia      Hypertension      Iron deficiency anemia secondary to blood loss (chronic)     menorrhagia     Migraine headache without aura      Seizure disorder (H)     one seizure-grandmal in        PAST SURGICAL HISTORY:  Past Surgical History:   Procedure Laterality Date     APPENDECTOMY  2007     BIOPSY  ??    [place] - marker placed left breast     CL AFF SURGICAL PATHOLOGY      with cryotherapy     COLONOSCOPY  2005    polyp excision, repeat  5 years      COLONOSCOPY N/A 09/15/2016    3 adenoma polyps, repeat in 3 years      COLONOSCOPY N/A 2022    Procedure: COLONOSCOPY, WITH POLYPECTOMY AND BIOPSY;  Surgeon: Luis Bryant MD;  Location:  GI     D & C       HYSTEROSCOPY       TUBAL LIGATION         FAMILY HISTORY:  Family History   Problem Relation Age of Onset     Genitourinary Problems Mother      Hypertension Father      Lipids Father      Cancer Father         MELANOMA     Asthma Sister      Breast Cancer Sister         Shanika - mastectomy 2021     Asthma Sister      Osteoporosis Maternal Grandmother      Cancer Paternal Grandmother      Other Cancer Paternal Grandmother      Coronary Artery Disease Paternal Grandfather      Cardiovascular Paternal Grandfather          of heart attack, joe ge 68     Substance Abuse Paternal Grandfather               Substance Abuse Daughter               Cerebrovascular Disease No family hx of        SOCIAL HISTORY:  Social History     Socioeconomic History     Marital status:      Spouse name:      Number of children: 2     Years of education: None     Highest education level: None   Occupational History     Occupation: sales      Employer: [employer]     Comment: [employer]    Tobacco Use     Smoking status: Former     Current packs/day: 0.00     Types: Cigarettes     Start date: 1972     Quit date: 1989     Years since quittin.6     Smokeless tobacco: Never   Substance and Sexual Activity     Alcohol use: Yes     Alcohol/week: 3.0 standard drinks of alcohol     Types: 3 Standard drinks or equivalent per week     Comment: 2/week, glasses of wine     Drug use: No     Sexual activity: Not Currently     Partners: Male   Other Topics Concern      Service No     Blood Transfusions No     Caffeine Concern No     Occupational Exposure No     Hobby Hazards No     Sleep Concern Yes     Comment: trouble sleeping      Stress Concern Yes     Comment: little     Weight Concern No     Special Diet No     Back Care Yes     Comment: low back pain would like refferal to chiroprator     Exercise Yes     Comment: alot work with job     Bike Helmet No     Seat Belt Yes     Self-Exams Yes     Parent/sibling w/ CABG, MI or angioplasty before 65F 55M? No   Social History Narrative     is alcoholic and had stage 4 lung cancerTwo children Dexa scan 05 normal     Social Determinants of Health     Financial Resource Strain: Low Risk  (2024)    Financial Resource Strain      Within the past 12 months, have you or your family members you live with been unable to get utilities (heat, electricity) when it was really needed?: No   Food Insecurity: Low Risk  (2024)    Food Insecurity      Within the past 12 months, did you worry that your food would run out before you got money to buy more?: No      Within the past 12 months, did the food you bought just not last and you didn t have money to get more?: No   Transportation Needs: Low Risk  (2024)    Transportation Needs      Within the past 12 months, has lack of transportation kept you from medical appointments, getting your medicines, non-medical meetings or appointments, work, or from getting things that you need?: No  "  Physical Activity: Insufficiently Active (7/12/2024)    Exercise Vital Sign      Days of Exercise per Week: 2 days      Minutes of Exercise per Session: 10 min   Stress: Stress Concern Present (7/12/2024)    [place] [place] [employer] - Occupational Stress Questionnaire      Feeling of Stress : To some extent   Social Connections: Unknown (7/12/2024)    Social Connection and Isolation Panel [NHANES]      Frequency of Social Gatherings with Friends and Family: Three times a week   Interpersonal Safety: Low Risk  (7/16/2024)    Interpersonal Safety      Do you feel physically and emotionally safe where you currently live?: Yes      Within the past 12 months, have you been hit, slapped, kicked or otherwise physically hurt by someone?: No      Within the past 12 months, have you been humiliated or emotionally abused in other ways by your partner or ex-partner?: No   Housing Stability: Low Risk  (7/12/2024)    Housing Stability      Do you have housing? : Yes      Are you worried about losing your housing?: No       Review of Systems:  Skin:        Eyes:       ENT:       Respiratory:       Cardiovascular:       Gastroenterology:      Genitourinary:       Musculoskeletal:       Neurologic:       Psychiatric:       Heme/Lymph/Imm:       Endocrine:         Physical Exam:  Vitals: BP (!) 145/91 (BP Location: Left arm, Patient Position: Sitting)   Pulse 59   Ht 1.651 m (5' 5\")   Wt 71.8 kg (158 lb 3.2 oz)   BMI 26.33 kg/m      Constitutional:  cooperative, alert and oriented, well developed, well nourished, in no acute distress        Skin:  warm and dry to the touch, no apparent skin lesions or masses noted          Head:  normocephalic, no masses or lesions        Eyes:  conjunctivae and lids unremarkable        Lymph:No Cervical lymphadenopathy present     ENT:  no pallor or cyanosis, dentition good        Neck:  carotid pulses are full and equal bilaterally, JVP normal, no carotid bruit    "     Respiratory:  normal breath sounds, clear to auscultation, normal A-P diameter, normal symmetry, normal respiratory excursion, no use of accessory muscles         Cardiac: regular rhythm, normal S1/S2, no S3 or S4, apical impulse not displaced, no murmurs, gallops or rubs                pulses full and equal, no bruits auscultated                                        GI:  abdomen soft, non-tender, BS normoactive, no mass, no HSM, no bruits        Extremities and Muscular Skeletal:  no deformities, clubbing, cyanosis, erythema observed              Neurological:  no gross motor deficits        Psych:  Alert and Oriented x 3        Recent Lab Results:  LIPID RESULTS:  Lab Results   Component Value Date    CHOL 226 (H) 05/03/2024    CHOL 220 (H) 05/05/2021    HDL 85 05/03/2024     05/05/2021     (H) 05/03/2024     (H) 05/05/2021    TRIG 139 05/03/2024    TRIG 84 05/05/2021    CHOLHDLRATIO 2.2 07/23/2014       LIVER ENZYME RESULTS:  Lab Results   Component Value Date    AST 26 04/16/2024    AST 16 11/20/2020    ALT 20 04/16/2024    ALT 18 11/20/2020       CBC RESULTS:  Lab Results   Component Value Date    WBC 6.0 11/08/2018    RBC 4.03 11/08/2018    HGB 13.3 11/08/2018    HCT 40.4 11/08/2018     11/08/2018    MCH 33.0 11/08/2018    MCHC 32.9 11/08/2018    RDW 12.0 11/08/2018     11/08/2018       BMP RESULTS:  Lab Results   Component Value Date     04/16/2024     11/20/2020    POTASSIUM 4.5 04/16/2024    POTASSIUM 4.1 03/23/2022    POTASSIUM 4.2 11/20/2020    CHLORIDE 106 04/16/2024    CHLORIDE 109 03/23/2022    CHLORIDE 105 11/20/2020    CO2 21 (L) 04/16/2024    CO2 24 03/23/2022    CO2 25 11/20/2020    ANIONGAP 13 04/16/2024    ANIONGAP 7 03/23/2022    ANIONGAP 5 11/20/2020    GLC 97 04/16/2024    GLC 95 03/23/2022    GLC 97 11/20/2020    BUN 13.6 04/16/2024    BUN 12 03/23/2022    BUN 13 11/20/2020    CR 0.81 04/16/2024    CR 0.87 11/20/2020    GFRESTIMATED 78  "04/16/2024    GFRESTIMATED 69 11/20/2020    GFRESTBLACK 80 11/20/2020    BEBO 9.9 04/16/2024    BEBO 9.9 11/20/2020        A1C RESULTS:  No results found for: \"A1C\"    INR RESULTS:  No results found for: \"INR\"        CC  Bri House MD  [address] [place] [address] [place] [address]                     Thank you for allowing me to participate in the care of your patient.      Sincerely,     DR BRITTANIE HAWTHORNE MD     [facility]  cc:   Bri House MD  [address] [place] [address] [place] [id number]      "

## 2024-08-30 NOTE — PROGRESS NOTES
HPI and Plan:   69-year-old lady referred for cholesterol management by her PMD    In May of this year, total cholesterol was 226, HDL 85 .  On Crestor at that time, 10 mg once daily.  She complains of elbow and upper limb joint pains and with cessation of Crestor the symptoms resolved    I do see a total cholesterol of 324 in November 2020.  She was on no medications at that time.  Her LDL was 215.  HDL 76    I reassured her that her family history is negative.  Her mother was first diagnosed with coronary artery disease in her 70s and received a stent.  Her father has hypertension and is still alive at the age of 92.  There are no other family members with hypercholesterolemia    She does not smoke drink or abuse drugs.  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    She has no symptoms of exertional angina or heart failure.  She describes an episode of right upper chest discomfort which occurs perhaps once a year at rest.  This is highly unlikely to be angina    She has hypertension.  She recalls being on the medication previously which resulted in a cough.  I think there is a very good likelihood that this is not ACE inhibitor.  Currently she is on Toprol-XL 75 mg once daily.  Her blood pressure today is elevated.  At home she tells me readings are 138/88 which tends to be on the high side.  I do think she will need another medication and I have added moderate dose Cozaar.  I advised her that moderate doses of 2 medications will be preferable to high dose of 1 medication in terms of side effects.  She is agreeable.  I advised her that Cozaar has an excellent side effect profile but we should check her electrolytes in a few weeks time.    She is also concerned that a chest x-ray showed possible cardiomegaly.  I reassured her that chest x-ray finding of cardiomegaly is not likely to be very specific and I will request an echocardiogram    Her cardiac examination is normal.    EKG today  shows sinus rhythm with incomplete right bundle branch block which could be a normal variant    Finally her cholesterol.  With an LDL of 215 there is no doubt by current guidelines that she should be on a cholesterol-lowering medication.  As she does have statin myopathy with Crestor I would like to try her on 3 times a week dosing at a lower dose of 5 mg.  If this is tolerated and we will move to 10 mg.  Otherwise the next step would probably be Setia but I would very much doubt that the struggle or would bring down her cholesterol significantly if these measures do not work then we will try and put her on a PCSK9 inhibitor.    Once I see your test results I will arrange follow-up          Orders Placed This Encounter   Procedures    EKG 12-lead complete w/read - Clinics (performed today)       No orders of the defined types were placed in this encounter.      Encounter Diagnosis   Name Primary?    HYPERLIPIDEMIA LDL GOAL <130        CURRENT MEDICATIONS:  Current Outpatient Medications   Medication Sig Dispense Refill    Bacillus Coagulans-Inulin (ALIGN PREBIOTIC-PROBIOTIC PO)       Cholecalciferol (VITAMIN D-3 PO)       Cranberry 500 MG CAPS Take  by mouth. Daily        fexofenadine (ALLEGRA) 180 MG tablet Take 180 mg by mouth daily      FISH OIL 1000 MG OR CAPS Twice daily      MAGNESIUM MALATE PO       metoprolol succinate ER (TOPROL XL) 50 MG 24 hr tablet TAKE ONE AND ONE-HALF TABLETS BY MOUTH DAILY 135 tablet 1    topiramate (TOPAMAX) 25 MG tablet       rosuvastatin (CRESTOR) 5 MG tablet Take 1 tablet (5 mg) by mouth daily (Patient not taking: Reported on 5/21/2024) 90 tablet 1       ALLERGIES     Allergies   Allergen Reactions    Aramine [Metaraminol Bitartrate]     Pcn [Penicillins]      Hives, swelling       PAST MEDICAL HISTORY:  Past Medical History:   Diagnosis Date    BPPV (benign paroxysmal positional vertigo) 10/26/2012    Herpes genitalia     Hyperlipidemia     Hypertension     Iron deficiency anemia  secondary to blood loss (chronic)     menorrhagia    Migraine headache without aura     Seizure disorder (H)     one seizure-grandmal in        PAST SURGICAL HISTORY:  Past Surgical History:   Procedure Laterality Date    APPENDECTOMY  2007    BIOPSY  ??    [place] - marker placed left breast    CL AFF SURGICAL PATHOLOGY  1970    with cryotherapy    COLONOSCOPY  2005    polyp excision, repeat  5 years     COLONOSCOPY N/A 09/15/2016    3 adenoma polyps, repeat in 3 years     COLONOSCOPY N/A 2022    Procedure: COLONOSCOPY, WITH POLYPECTOMY AND BIOPSY;  Surgeon: Luis Bryant MD;  Location:  GI    D & C      HYSTEROSCOPY      TUBAL LIGATION         FAMILY HISTORY:  Family History   Problem Relation Age of Onset    Genitourinary Problems Mother     Hypertension Father     Lipids Father     Cancer Father         MELANOMA    Asthma Sister     Breast Cancer Sister         Shanika - mastectomy 2021    Asthma Sister     Osteoporosis Maternal Grandmother     Cancer Paternal Grandmother     Other Cancer Paternal Grandmother     Coronary Artery Disease Paternal Grandfather     Cardiovascular Paternal Grandfather          of heart attack, joe ge 68    Substance Abuse Paternal Grandfather              Substance Abuse Daughter              Cerebrovascular Disease No family hx of        SOCIAL HISTORY:  Social History     Socioeconomic History    Marital status:      Spouse name:     Number of children: 2    Years of education: None    Highest education level: None   Occupational History    Occupation: sales      Employer: [employer]     Comment: [employer]   Tobacco Use    Smoking status: Former     Current packs/day: 0.00     Types: Cigarettes     Start date: 1972     Quit date: 1989     Years since quittin.6    Smokeless tobacco: Never   Substance and Sexual Activity    Alcohol use: Yes     Alcohol/week: 3.0 standard  drinks of alcohol     Types: 3 Standard drinks or equivalent per week     Comment: 2/week, glasses of wine    Drug use: No    Sexual activity: Not Currently     Partners: Male   Other Topics Concern     Service No    Blood Transfusions No    Caffeine Concern No    Occupational Exposure No    Hobby Hazards No    Sleep Concern Yes     Comment: trouble sleeping     Stress Concern Yes     Comment: little    Weight Concern No    Special Diet No    Back Care Yes     Comment: low back pain would like refferal to chiroprator    Exercise Yes     Comment: alot work with job    Bike Helmet No    Seat Belt Yes    Self-Exams Yes    Parent/sibling w/ CABG, MI or angioplasty before 65F 55M? No   Social History Narrative     is alcoholic and had stage 4 lung cancerTwo children Dexa scan 8/18/05 normal     Social Determinants of Health     Financial Resource Strain: Low Risk  (7/12/2024)    Financial Resource Strain     Within the past 12 months, have you or your family members you live with been unable to get utilities (heat, electricity) when it was really needed?: No   Food Insecurity: Low Risk  (7/12/2024)    Food Insecurity     Within the past 12 months, did you worry that your food would run out before you got money to buy more?: No     Within the past 12 months, did the food you bought just not last and you didn t have money to get more?: No   Transportation Needs: Low Risk  (7/12/2024)    Transportation Needs     Within the past 12 months, has lack of transportation kept you from medical appointments, getting your medicines, non-medical meetings or appointments, work, or from getting things that you need?: No   Physical Activity: Insufficiently Active (7/12/2024)    Exercise Vital Sign     Days of Exercise per Week: 2 days     Minutes of Exercise per Session: 10 min   Stress: Stress Concern Present (7/12/2024)    [employer] [place] [employer] - Occupational Stress Questionnaire     Feeling of Stress  ": To some extent   Social Connections: Unknown (7/12/2024)    Social Connection and Isolation Panel [NHANES]     Frequency of Social Gatherings with Friends and Family: Three times a week   Interpersonal Safety: Low Risk  (7/16/2024)    Interpersonal Safety     Do you feel physically and emotionally safe where you currently live?: Yes     Within the past 12 months, have you been hit, slapped, kicked or otherwise physically hurt by someone?: No     Within the past 12 months, have you been humiliated or emotionally abused in other ways by your partner or ex-partner?: No   Housing Stability: Low Risk  (7/12/2024)    Housing Stability     Do you have housing? : Yes     Are you worried about losing your housing?: No       Review of Systems:  Skin:        Eyes:       ENT:       Respiratory:       Cardiovascular:       Gastroenterology:      Genitourinary:       Musculoskeletal:       Neurologic:       Psychiatric:       Heme/Lymph/Imm:       Endocrine:         Physical Exam:  Vitals: BP (!) 145/91 (BP Location: Left arm, Patient Position: Sitting)   Pulse 59   Ht 1.651 m (5' 5\")   Wt 71.8 kg (158 lb 3.2 oz)   BMI 26.33 kg/m      Constitutional:  cooperative, alert and oriented, well developed, well nourished, in no acute distress        Skin:  warm and dry to the touch, no apparent skin lesions or masses noted          Head:  normocephalic, no masses or lesions        Eyes:  conjunctivae and lids unremarkable        Lymph:No Cervical lymphadenopathy present     ENT:  no pallor or cyanosis, dentition good        Neck:  carotid pulses are full and equal bilaterally, JVP normal, no carotid bruit        Respiratory:  normal breath sounds, clear to auscultation, normal A-P diameter, normal symmetry, normal respiratory excursion, no use of accessory muscles         Cardiac: regular rhythm, normal S1/S2, no S3 or S4, apical impulse not displaced, no murmurs, gallops or rubs                pulses full and equal, no bruits " "auscultated                                        GI:  abdomen soft, non-tender, BS normoactive, no mass, no HSM, no bruits        Extremities and Muscular Skeletal:  no deformities, clubbing, cyanosis, erythema observed              Neurological:  no gross motor deficits        Psych:  Alert and Oriented x 3        Recent Lab Results:  LIPID RESULTS:  Lab Results   Component Value Date    CHOL 226 (H) 05/03/2024    CHOL 220 (H) 05/05/2021    HDL 85 05/03/2024     05/05/2021     (H) 05/03/2024     (H) 05/05/2021    TRIG 139 05/03/2024    TRIG 84 05/05/2021    CHOLHDLRATIO 2.2 07/23/2014       LIVER ENZYME RESULTS:  Lab Results   Component Value Date    AST 26 04/16/2024    AST 16 11/20/2020    ALT 20 04/16/2024    ALT 18 11/20/2020       CBC RESULTS:  Lab Results   Component Value Date    WBC 6.0 11/08/2018    RBC 4.03 11/08/2018    HGB 13.3 11/08/2018    HCT 40.4 11/08/2018     11/08/2018    MCH 33.0 11/08/2018    MCHC 32.9 11/08/2018    RDW 12.0 11/08/2018     11/08/2018       BMP RESULTS:  Lab Results   Component Value Date     04/16/2024     11/20/2020    POTASSIUM 4.5 04/16/2024    POTASSIUM 4.1 03/23/2022    POTASSIUM 4.2 11/20/2020    CHLORIDE 106 04/16/2024    CHLORIDE 109 03/23/2022    CHLORIDE 105 11/20/2020    CO2 21 (L) 04/16/2024    CO2 24 03/23/2022    CO2 25 11/20/2020    ANIONGAP 13 04/16/2024    ANIONGAP 7 03/23/2022    ANIONGAP 5 11/20/2020    GLC 97 04/16/2024    GLC 95 03/23/2022    GLC 97 11/20/2020    BUN 13.6 04/16/2024    BUN 12 03/23/2022    BUN 13 11/20/2020    CR 0.81 04/16/2024    CR 0.87 11/20/2020    GFRESTIMATED 78 04/16/2024    GFRESTIMATED 69 11/20/2020    GFRESTBLACK 80 11/20/2020    BEBO 9.9 04/16/2024    BEBO 9.9 11/20/2020        A1C RESULTS:  No results found for: \"A1C\"    INR RESULTS:  No results found for: \"INR\"        CC  Bri House MD  [address] [place] [address]                 "

## 2024-10-30 ENCOUNTER — LAB (OUTPATIENT)
Dept: LAB | Facility: CLINIC | Age: 69
End: 2024-10-30
Payer: COMMERCIAL

## 2024-10-30 DIAGNOSIS — E78.5 HYPERLIPIDEMIA LDL GOAL <130: ICD-10-CM

## 2024-10-30 DIAGNOSIS — I51.7 CARDIOMEGALY: ICD-10-CM

## 2024-10-30 LAB
CHOLEST SERPL-MCNC: 250 MG/DL
FASTING STATUS PATIENT QL REPORTED: YES
HDLC SERPL-MCNC: 69 MG/DL
LDLC SERPL CALC-MCNC: 126 MG/DL
NONHDLC SERPL-MCNC: 181 MG/DL
TRIGL SERPL-MCNC: 274 MG/DL

## 2024-10-30 PROCEDURE — 80061 LIPID PANEL: CPT

## 2024-10-30 PROCEDURE — 36415 COLL VENOUS BLD VENIPUNCTURE: CPT

## 2024-10-31 ENCOUNTER — TELEPHONE (OUTPATIENT)
Dept: CARDIOLOGY | Facility: CLINIC | Age: 69
End: 2024-10-31
Payer: COMMERCIAL

## 2024-10-31 DIAGNOSIS — E78.5 HYPERLIPIDEMIA LDL GOAL <130: Primary | ICD-10-CM

## 2024-10-31 DIAGNOSIS — H81.10 BENIGN PAROXYSMAL POSITIONAL VERTIGO, UNSPECIFIED LATERALITY: ICD-10-CM

## 2024-10-31 DIAGNOSIS — I10 HYPERTENSION GOAL BP (BLOOD PRESSURE) < 140/90: ICD-10-CM

## 2024-10-31 DIAGNOSIS — E78.5 HYPERLIPIDEMIA LDL GOAL <100: ICD-10-CM

## 2024-10-31 RX ORDER — ROSUVASTATIN CALCIUM 5 MG/1
5 TABLET, COATED ORAL DAILY
Qty: 90 TABLET | Refills: 1 | Status: SHIPPED | OUTPATIENT
Start: 2024-10-31

## 2024-10-31 NOTE — TELEPHONE ENCOUNTER
Received request from Dr. Pozo to reach out to Love and find out what option she would choose; increase statin dose or try for Repatha\Praluent injection.    Love says she would rather start with increasing the Crestor 5mg up to 7 times a week.     Requested new Rx be sent to [facility] for 90 day supply.   Love said that Dr. Pozo did mention the injectable medications at her office visit, but she was not willing to do that right now.  Writer has called in Rx.    Love states she will get labs in 6 - 8 weeks after increasing the dose.   Order entered.   She is also aware she needs to make a follow up appointment.    Imelda Mendoza RN on 10/31/2024 at 3:40 PM

## 2024-12-10 ENCOUNTER — TRANSFERRED RECORDS (OUTPATIENT)
Dept: HEALTH INFORMATION MANAGEMENT | Facility: CLINIC | Age: 69
End: 2024-12-10
Payer: COMMERCIAL

## 2025-01-28 ENCOUNTER — VIRTUAL VISIT (OUTPATIENT)
Dept: FAMILY MEDICINE | Facility: CLINIC | Age: 70
End: 2025-01-28
Payer: COMMERCIAL

## 2025-01-28 DIAGNOSIS — G40.209 LOCALIZATION-RELATED (FOCAL) (PARTIAL) SYMPTOMATIC EPILEPSY AND EPILEPTIC SYNDROMES WITH COMPLEX PARTIAL SEIZURES, NOT INTRACTABLE, WITHOUT STATUS EPILEPTICUS (H): ICD-10-CM

## 2025-01-28 DIAGNOSIS — I10 BENIGN ESSENTIAL HYPERTENSION: ICD-10-CM

## 2025-01-28 DIAGNOSIS — E78.5 HYPERLIPIDEMIA LDL GOAL <130: ICD-10-CM

## 2025-01-28 DIAGNOSIS — Z76.89 ENCOUNTER TO ESTABLISH CARE: Primary | ICD-10-CM

## 2025-01-28 PROCEDURE — 98005 SYNCH AUDIO-VIDEO EST LOW 20: CPT | Performed by: PHYSICIAN ASSISTANT

## 2025-01-28 NOTE — PROGRESS NOTES
"Love is a 69 year old who is being evaluated via a billable video visit.    How would you like to obtain your AVS? MyChart  If the video visit is dropped, the invitation should be resent by: Text to cell phone: [phone]  Will anyone else be joining your video visit? No      Assessment & Plan     Encounter to establish care      Benign essential hypertension  Stable, no refills needed today    Localization-related (focal) (partial) symptomatic epilepsy and epileptic syndromes with complex partial seizures, not intractable, without status epilepticus (H)  Per neurology, no symptoms or concerns.  Not currently taking medication    HYPERLIPIDEMIA LDL GOAL <130  stable          BMI  Estimated body mass index is 26.33 kg/m  as calculated from the following:    Height as of 8/30/24: 1.651 m (5' 5\").    Weight as of 8/30/24: 71.8 kg (158 lb 3.2 oz).         Follow up in 6 months for annual exam    Subjective   Love is a 69 year old, presenting for the following health issues:  Establish Care        1/28/2025    12:49 PM   Additional Questions   Roomed by [facility]       Video Start Time: 1:40 PM    History of Present Illness       Hyperlipidemia:  She presents for follow up of hyperlipidemia.   She is taking medication to lower cholesterol. She is not having myalgia or other side effects to statin medications.    Hypertension: She presents for follow up of hypertension.  She does not check blood pressure  regularly outside of the clinic. Outside blood pressures have been over 140/90. She does not follow a low salt diet.     She eats 2-3 servings of fruits and vegetables daily.She consumes 0 sweetened beverage(s) daily.She exercises with enough effort to increase her heart rate 10 to 19 minutes per day.  She exercises with enough effort to increase her heart rate 3 or less days per week.   She is taking medications regularly.       Home BPs are around 130/80s, previously working with Dr. House.  Needs new PCP.  No " specific concerns today    Using statin for HLD, tolerating well    Following with neurology for hx of seizure disorder.  Has not had seizure in several years and recent EEGs have been unremarkable.  She has been weaned from antiepileptic medications.       Review of Systems  Constitutional, HEENT, cardiovascular, pulmonary, GI, , musculoskeletal, neuro, skin, endocrine and psych systems are negative, except as otherwise noted.      Objective           Vitals:  No vitals were obtained today due to virtual visit.    Physical Exam   GENERAL: alert and no distress  EYES: Eyes grossly normal to inspection.  No discharge or erythema, or obvious scleral/conjunctival abnormalities.  RESP: No audible wheeze, cough, or visible cyanosis.    SKIN: Visible skin clear. No significant rash, abnormal pigmentation or lesions.  NEURO: Cranial nerves grossly intact.  Mentation and speech appropriate for age.  PSYCH: Appropriate affect, tone, and pace of words          Video-Visit Details    Type of service:  Video Visit   Video End Time:155 pm  Originating Location (pt. Location): Home    Distant Location (provider location):  On-site  Platform used for Video Visit: Anjelica  Signed Electronically by: Finn Perez PA-C

## 2025-02-04 DIAGNOSIS — E78.5 HYPERLIPIDEMIA LDL GOAL <130: ICD-10-CM

## 2025-02-04 DIAGNOSIS — I51.7 CARDIOMEGALY: ICD-10-CM

## 2025-02-04 RX ORDER — LOSARTAN POTASSIUM 50 MG/1
50 TABLET ORAL DAILY
Qty: 30 TABLET | Refills: 5 | Status: SHIPPED | OUTPATIENT
Start: 2025-02-04

## 2025-02-05 DIAGNOSIS — I10 BENIGN ESSENTIAL HYPERTENSION: ICD-10-CM

## 2025-02-06 RX ORDER — METOPROLOL SUCCINATE 50 MG/1
TABLET, EXTENDED RELEASE ORAL
Qty: 135 TABLET | Refills: 0 | Status: SHIPPED | OUTPATIENT
Start: 2025-02-06

## 2025-04-22 DIAGNOSIS — E78.5 HYPERLIPIDEMIA LDL GOAL <100: ICD-10-CM

## 2025-04-22 RX ORDER — ROSUVASTATIN CALCIUM 5 MG/1
5 TABLET, COATED ORAL DAILY
Qty: 90 TABLET | Refills: 1 | Status: SHIPPED | OUTPATIENT
Start: 2025-04-22

## 2025-05-04 DIAGNOSIS — I10 BENIGN ESSENTIAL HYPERTENSION: ICD-10-CM

## 2025-05-06 RX ORDER — METOPROLOL SUCCINATE 50 MG/1
TABLET, EXTENDED RELEASE ORAL
Qty: 135 TABLET | Refills: 0 | Status: SHIPPED | OUTPATIENT
Start: 2025-05-06

## 2025-06-16 ENCOUNTER — PATIENT OUTREACH (OUTPATIENT)
Dept: CARE COORDINATION | Facility: CLINIC | Age: 70
End: 2025-06-16
Payer: COMMERCIAL

## 2025-06-30 ENCOUNTER — PATIENT OUTREACH (OUTPATIENT)
Dept: CARE COORDINATION | Facility: CLINIC | Age: 70
End: 2025-06-30
Payer: COMMERCIAL

## 2025-07-27 SDOH — HEALTH STABILITY: PHYSICAL HEALTH: ON AVERAGE, HOW MANY DAYS PER WEEK DO YOU ENGAGE IN MODERATE TO STRENUOUS EXERCISE (LIKE A BRISK WALK)?: 3 DAYS

## 2025-07-27 SDOH — HEALTH STABILITY: PHYSICAL HEALTH: ON AVERAGE, HOW MANY MINUTES DO YOU ENGAGE IN EXERCISE AT THIS LEVEL?: 60 MIN

## 2025-07-27 ASSESSMENT — SOCIAL DETERMINANTS OF HEALTH (SDOH): HOW OFTEN DO YOU GET TOGETHER WITH FRIENDS OR RELATIVES?: MORE THAN THREE TIMES A WEEK

## 2025-07-28 DIAGNOSIS — E78.5 HYPERLIPIDEMIA LDL GOAL <100: Primary | ICD-10-CM

## 2025-07-28 DIAGNOSIS — I51.7 CARDIOMEGALY: ICD-10-CM

## 2025-07-28 RX ORDER — LOSARTAN POTASSIUM 50 MG/1
50 TABLET ORAL DAILY
Qty: 30 TABLET | Refills: 2 | Status: SHIPPED | OUTPATIENT
Start: 2025-07-28 | End: 2025-07-29

## 2025-07-28 NOTE — TELEPHONE ENCOUNTER
Received request to refill the Losartan 50mg per day, from [facility].    Writer has reviewed that Dr. Pozo prescribes medications for patient, and Love is in need of getting a fall appointment scheduled, as well as fasting lipid panel.    Writer refilled the Losartan, and send Edmodot message to Love that she is due for an apmnt with [facility] in October as well as fasting labs.    Imelda Mendoza RN on 7/28/2025 at 9:08 AM     General OB Triage Discharge Instructions

## 2025-07-29 ENCOUNTER — OFFICE VISIT (OUTPATIENT)
Dept: FAMILY MEDICINE | Facility: CLINIC | Age: 70
End: 2025-07-29
Payer: COMMERCIAL

## 2025-07-29 VITALS
TEMPERATURE: 100 F | RESPIRATION RATE: 14 BRPM | OXYGEN SATURATION: 97 % | DIASTOLIC BLOOD PRESSURE: 80 MMHG | BODY MASS INDEX: 27.49 KG/M2 | WEIGHT: 165 LBS | SYSTOLIC BLOOD PRESSURE: 134 MMHG | HEART RATE: 60 BPM | HEIGHT: 65 IN

## 2025-07-29 DIAGNOSIS — Z00.00 MEDICARE ANNUAL WELLNESS VISIT, SUBSEQUENT: Primary | ICD-10-CM

## 2025-07-29 DIAGNOSIS — I10 BENIGN ESSENTIAL HYPERTENSION: ICD-10-CM

## 2025-07-29 DIAGNOSIS — G40.909 SEIZURE DISORDER (H): ICD-10-CM

## 2025-07-29 DIAGNOSIS — E78.5 HYPERLIPIDEMIA LDL GOAL <100: ICD-10-CM

## 2025-07-29 DIAGNOSIS — I51.7 CARDIOMEGALY: ICD-10-CM

## 2025-07-29 LAB
ALBUMIN SERPL BCG-MCNC: 4.5 G/DL (ref 3.5–5.2)
ALP SERPL-CCNC: 89 U/L (ref 40–150)
ALT SERPL W P-5'-P-CCNC: 19 U/L (ref 0–50)
ANION GAP SERPL CALCULATED.3IONS-SCNC: 11 MMOL/L (ref 7–15)
AST SERPL W P-5'-P-CCNC: 26 U/L (ref 0–45)
BILIRUB SERPL-MCNC: 0.5 MG/DL
BUN SERPL-MCNC: 10.5 MG/DL (ref 8–23)
CALCIUM SERPL-MCNC: 9.8 MG/DL (ref 8.8–10.4)
CHLORIDE SERPL-SCNC: 101 MMOL/L (ref 98–107)
CHOLEST SERPL-MCNC: 221 MG/DL
CREAT SERPL-MCNC: 0.81 MG/DL (ref 0.51–0.95)
EGFRCR SERPLBLD CKD-EPI 2021: 77 ML/MIN/1.73M2
FASTING STATUS PATIENT QL REPORTED: YES
FASTING STATUS PATIENT QL REPORTED: YES
GLUCOSE SERPL-MCNC: 99 MG/DL (ref 70–99)
HCO3 SERPL-SCNC: 25 MMOL/L (ref 22–29)
HDLC SERPL-MCNC: 79 MG/DL
LDLC SERPL CALC-MCNC: 112 MG/DL
NONHDLC SERPL-MCNC: 142 MG/DL
POTASSIUM SERPL-SCNC: 4.4 MMOL/L (ref 3.4–5.3)
PROT SERPL-MCNC: 7 G/DL (ref 6.4–8.3)
SODIUM SERPL-SCNC: 137 MMOL/L (ref 135–145)
TRIGL SERPL-MCNC: 151 MG/DL

## 2025-07-29 PROCEDURE — 99214 OFFICE O/P EST MOD 30 MIN: CPT | Mod: 25 | Performed by: PHYSICIAN ASSISTANT

## 2025-07-29 PROCEDURE — 3075F SYST BP GE 130 - 139MM HG: CPT | Performed by: PHYSICIAN ASSISTANT

## 2025-07-29 PROCEDURE — G0439 PPPS, SUBSEQ VISIT: HCPCS | Performed by: PHYSICIAN ASSISTANT

## 2025-07-29 PROCEDURE — 80061 LIPID PANEL: CPT | Performed by: PHYSICIAN ASSISTANT

## 2025-07-29 PROCEDURE — 1126F AMNT PAIN NOTED NONE PRSNT: CPT | Performed by: PHYSICIAN ASSISTANT

## 2025-07-29 PROCEDURE — 3079F DIAST BP 80-89 MM HG: CPT | Performed by: PHYSICIAN ASSISTANT

## 2025-07-29 PROCEDURE — 80053 COMPREHEN METABOLIC PANEL: CPT | Performed by: PHYSICIAN ASSISTANT

## 2025-07-29 PROCEDURE — 36415 COLL VENOUS BLD VENIPUNCTURE: CPT | Performed by: PHYSICIAN ASSISTANT

## 2025-07-29 RX ORDER — LOSARTAN POTASSIUM 50 MG/1
50 TABLET ORAL DAILY
Qty: 90 TABLET | Refills: 3 | Status: SHIPPED | OUTPATIENT
Start: 2025-07-29

## 2025-07-29 RX ORDER — METOPROLOL SUCCINATE 50 MG/1
TABLET, EXTENDED RELEASE ORAL
Qty: 135 TABLET | Refills: 3 | Status: SHIPPED | OUTPATIENT
Start: 2025-07-29

## 2025-07-29 RX ORDER — ROSUVASTATIN CALCIUM 5 MG/1
5 TABLET, COATED ORAL DAILY
Qty: 90 TABLET | Refills: 3 | Status: SHIPPED | OUTPATIENT
Start: 2025-07-29

## 2025-07-29 ASSESSMENT — PAIN SCALES - GENERAL: PAINLEVEL_OUTOF10: NO PAIN (0)

## 2025-07-29 NOTE — PROGRESS NOTES
"Preventive Care Visit  [facility] IVANA Perez PA-C, Family Medicine  Jul 29, 2025      Assessment & Plan     Medicare annual wellness visit, subsequent      Benign essential hypertension  Well controlled on current regimen  - COMPREHENSIVE METABOLIC PANEL; Future  - metoprolol succinate ER (TOPROL XL) 50 MG 24 hr tablet; TAKE 1 & 1/2 TABLETS BY MOUTH EVERY DAY    Hyperlipidemia LDL goal <100  Stable, taking Crestor 5 mg every other day and 10 mg   - rosuvastatin (CRESTOR) 5 MG tablet; Take 1 tablet (5 mg) by mouth daily.  - Lipid Profile (Chol, Trig, HDL, LDL calc); Future    Cardiomegaly  Noted on x ray.  Normal echo.   - losartan (COZAAR) 50 MG tablet; Take 1 tablet (50 mg) by mouth daily. Appointment needed for more refills    Seizure disorder (H)  Hs weaned for anticonvulsants. Monitor as needed, no current concerns.  Has been following with neurology      BMI  Estimated body mass index is 27.46 kg/m  as calculated from the following:    Height as of this encounter: 1.651 m (5' 5\").    Weight as of this encounter: 74.8 kg (165 lb).       Counseling  Appropriate preventive services were addressed with this patient via screening, questionnaire, or discussion as appropriate for fall prevention, nutrition, physical activity, Tobacco-use cessation, social engagement, weight loss and cognition.  Checklist reviewing preventive services available has been given to the patient.  Reviewed patient's diet, addressing concerns and/or questions.   She is at risk for lack of exercise and has been provided with information to increase physical activity for the benefit of her well-being.   The patient was instructed to see the dentist every 6 months.   She is at risk for psychosocial distress and has been provided with information to reduce risk.   The patient was provided with written information regarding signs of hearing loss.   Reviewed preventive health counseling, as reflected in " patient instructions       Regular exercise       Healthy diet/nutrition    Follow-up       Vadim Aleman is a 70 year old, presenting for the following:  Annual Visit        7/29/2025    10:48 AM   Additional Questions   Roomed by Miguel A CLIFFORD    Requesting pcp to take over refills, would like 90 day supply  Hyperlipidemia Follow-Up    Are you regularly taking any medication or supplement to lower your cholesterol?   Yes- Rosuvastatin  Are you having muscle aches or other side effects that you think could be caused by your cholesterol lowering medication?  No    Hypertension Follow-up    Do you check your blood pressure regularly outside of the clinic? No   Are you following a low salt diet? No  Are your blood pressures ever more than 140 on the top number (systolic) OR more   than 90 on the bottom number (diastolic), for example 140/90? N/A    BP Readings from Last 2 Encounters:   07/29/25 (!) 142/98   08/30/24 (!) 145/91       Advance Care Planning    Document on file is a Health Care Directive or POLST.        7/27/2025   General Health   How would you rate your overall physical health? Good   Feel stress (tense, anxious, or unable to sleep) To some extent   (!) STRESS CONCERN      7/27/2025   Nutrition   Diet: Regular (no restrictions)         7/27/2025   Exercise   Days per week of moderate/strenous exercise 3 days   Average minutes spent exercising at this level 60 min         7/27/2025   Social Factors   Frequency of gathering with friends or relatives More than three times a week   Worry food won't last until get money to buy more No   Food not last or not have enough money for food? No   Do you have housing? (Housing is defined as stable permanent housing and does not include staying outside in a car, in a tent, in an abandoned building, in an overnight shelter, or couch-surfing.) Yes   Are you worried about losing your housing? No   Lack of transportation? No   Unable to get utilities  (heat,electricity)? No         2025   Fall Risk   Fallen 2 or more times in the past year? No   Trouble with walking or balance? No          2025   Activities of Daily Living- Home Safety   Needs help with the following daily activites None of the above   Safety concerns in the home None of the above         2025   Dental   Dentist two times every year? (!) NO         2025   Hearing Screening   Hearing concerns? (!) TROUBLE UNDERSTANDING SOFT OR WHISPERED SPEECH.   Would you like a referral for hearing testing? No         2025   Driving Risk Screening   Patient/family members have concerns about driving No         2025   General Alertness/Fatigue Screening   Have you been more tired than usual lately? No         2025   Urinary Incontinence Screening   Bothered by leaking urine in past 6 months No         Today's PHQ-2 Score:       2025    10:41 AM   PHQ-2 (  [employer])   Q1: Little interest or pleasure in doing things 0   Q2: Feeling down, depressed or hopeless 0   PHQ-2 Score 0    Q1: Little interest or pleasure in doing things Not at all   Q2: Feeling down, depressed or hopeless Not at all   PHQ-2 Score 0       Patient-reported           2025   Substance Use   Alcohol more than 3/day or more than 7/wk No   Do you have a current opioid prescription? No   How severe/bad is pain from 1 to 10? 0/10 (No Pain)   Do you use any other substances recreationally? No     Social History     Tobacco Use    Smoking status: Former     Current packs/day: 0.00     Types: Cigarettes     Start date: 1972     Quit date: 1989     Years since quittin.6    Smokeless tobacco: Never   Substance Use Topics    Alcohol use: Yes     Alcohol/week: 3.0 standard drinks of alcohol     Types: 3 Standard drinks or equivalent per week     Comment: 2/week, glasses of wine    Drug use: No         2025   LAST FHS-7 RESULTS   1st degree relative breast or ovarian cancer No   Any relative  bilateral breast cancer No   Any male have breast cancer No   Any ONE woman have BOTH breast AND ovarian cancer No   Any woman with breast cancer before 50yrs No   2 or more relatives with breast AND/OR ovarian cancer No   2 or more relatives with breast AND/OR bowel cancer No       Mammogram Screening - Annual screen due to greater than 20% lifetime risk as estimated by Breast Cancer Risk Calculator    ASCVD Risk   The 10-year ASCVD risk score (Hunter SALAMANCA, et al., 2019) is: 17.3%    Values used to calculate the score:      Age: 70 years      Sex: Female      Is Non- : No      Diabetic: No      Tobacco smoker: No      Systolic Blood Pressure: 142 mmHg      Is BP treated: Yes      HDL Cholesterol: 69 mg/dL      Total Cholesterol: 250 mg/dL    Reviewed and updated as needed this visit by Provider                    Past Medical History:   Diagnosis Date    BPPV (benign paroxysmal positional vertigo) 10/26/2012    Herpes genitalia     Hyperlipidemia     Hypertension     Iron deficiency anemia secondary to blood loss (chronic)     menorrhagia    Migraine headache without aura     Seizure disorder (H)     one seizure-grandmal in 1989     Past Surgical History:   Procedure Laterality Date    APPENDECTOMY  2007    BIOPSY  2013??    [facility] - marker placed left breast    [facility] SURGICAL PATHOLOGY  1970    with cryotherapy    COLONOSCOPY  2005    polyp excision, repeat  5 years     COLONOSCOPY N/A 09/15/2016    3 adenoma polyps, repeat in 3 years     COLONOSCOPY N/A 04/29/2022    Procedure: COLONOSCOPY, WITH POLYPECTOMY AND BIOPSY;  Surgeon: Luis Bryant MD;  Location:  GI    [facility]      HYSTEROSCOPY      TUBAL LIGATION       Current providers sharing in care for this patient include:  Patient Care Team:  Finn Perez PA-C as PCP - General (Family Medicine)  Nikolas Pozo MD as MD (Cardiovascular Disease)  Nikolas Pozo MD as Assigned Heart and  "Vascular Provider  Clinic, [facility] as Assigned PCP    The following health maintenance items are reviewed in Epic and correct as of today:  Health Maintenance   Topic Date Due    ANNUAL REVIEW OF HM ORDERS  03/23/2024    COVID-19 VACCINE (8 - 2024-25 season) 03/18/2025    BMP  04/16/2025    CMP  04/16/2025    MEDICARE ANNUAL WELLNESS VISIT  07/16/2025    INFLUENZA VACCINE (1) 09/01/2025    LIPID  10/30/2025    FALL RISK ASSESSMENT  07/29/2026    DIABETES SCREENING  04/16/2027    COLORECTAL CANCER SCREENING  04/29/2027    MAMMO SCREENING  05/29/2027    DTAP/TDAP/TD VACCINE (4 - Td or Tdap) 11/20/2028    RSV VACCINE (1 - 1-dose 75+ series) 03/18/2029    ADVANCE CARE PLANNING  07/16/2029    DEXA  04/01/2037    HEPATITIS C SCREENING  Completed    MIGRAINE ACTION PLAN  Completed    PHQ-2 (once per calendar year)  Completed    PNEUMOCOCCAL VACCINE 50+ YEARS  Completed    HPV VACCINE (No Doses Required) Completed    ZOSTER VACCINE  Completed    MENINGITIS VACCINE  Aged Out         Review of Systems  Constitutional, HEENT, cardiovascular, pulmonary, GI, , musculoskeletal, neuro, skin, endocrine and psych systems are negative, except as otherwise noted.     Objective    Exam  BP (!) 142/98   Pulse 60   Temp 100  F (37.8  C) (Tympanic)   Resp 14   Ht 1.651 m (5' 5\")   Wt 74.8 kg (165 lb)   SpO2 97%   BMI 27.46 kg/m     Estimated body mass index is 27.46 kg/m  as calculated from the following:    Height as of this encounter: 1.651 m (5' 5\").    Weight as of this encounter: 74.8 kg (165 lb).    Physical Exam  GENERAL: alert and no distress  EYES: Eyes grossly normal to inspection, PERRL and conjunctivae and sclerae normal  HENT: ear canals and TM's normal, nose and mouth without ulcers or lesions  NECK: no adenopathy, no asymmetry, masses, or scars  RESP: lungs clear to auscultation - no rales, rhonchi or wheezes  CV: regular rate and rhythm, normal S1 S2, no S3 or S4, no murmur, click or rub, no " peripheral edema   ABDOMEN: soft, nontender, no hepatosplenomegaly, no masses and bowel sounds normal  MS: no gross musculoskeletal defects noted, no edema  PSYCH: mentation appears normal, affect normal/bright        7/29/2025   Mini Cog   Clock Draw Score 2 Normal   3 Item Recall 3 objects recalled   Mini Cog Total Score 5            Signed Electronically by: Finn Perez PA-C

## (undated) DEVICE — SOL WATER IRRIG 1000ML BOTTLE 2F7114

## (undated) RX ORDER — FENTANYL CITRATE 50 UG/ML
INJECTION, SOLUTION INTRAMUSCULAR; INTRAVENOUS
Status: DISPENSED
Start: 2022-04-29